# Patient Record
Sex: FEMALE | Race: WHITE | Employment: FULL TIME
[De-identification: names, ages, dates, MRNs, and addresses within clinical notes are randomized per-mention and may not be internally consistent; named-entity substitution may affect disease eponyms.]

---

## 2017-02-06 ENCOUNTER — OFFICE VISIT (OUTPATIENT)
Dept: FAMILY MEDICINE CLINIC | Facility: CLINIC | Age: 37
End: 2017-02-06

## 2017-02-06 VITALS
WEIGHT: 242.4 LBS | HEART RATE: 94 BPM | SYSTOLIC BLOOD PRESSURE: 124 MMHG | DIASTOLIC BLOOD PRESSURE: 89 MMHG | HEIGHT: 70 IN | BODY MASS INDEX: 34.7 KG/M2 | TEMPERATURE: 97.7 F

## 2017-02-06 DIAGNOSIS — R51.9 FREQUENT HEADACHES: Primary | ICD-10-CM

## 2017-02-06 DIAGNOSIS — E66.9 OBESITY (BMI 30-39.9): ICD-10-CM

## 2017-02-06 PROCEDURE — 99203 OFFICE O/P NEW LOW 30 MIN: CPT | Performed by: FAMILY MEDICINE

## 2017-02-06 RX ORDER — SUMATRIPTAN 50 MG/1
50 TABLET, FILM COATED ORAL
Qty: 9 TABLET | Refills: 3 | Status: SHIPPED | OUTPATIENT
Start: 2017-02-06 | End: 2017-03-23 | Stop reason: SDUPTHER

## 2017-02-06 ASSESSMENT — ENCOUNTER SYMPTOMS
RHINORRHEA: 0
WHEEZING: 0
COUGH: 0
EYE WATERING: 0
VOMITING: 0
SINUS PRESSURE: 0
PHOTOPHOBIA: 1
NAUSEA: 0
SHORTNESS OF BREATH: 0
EYE PAIN: 0

## 2017-02-06 ASSESSMENT — PATIENT HEALTH QUESTIONNAIRE - PHQ9
SUM OF ALL RESPONSES TO PHQ QUESTIONS 1-9: 1
2. FEELING DOWN, DEPRESSED OR HOPELESS: 1
1. LITTLE INTEREST OR PLEASURE IN DOING THINGS: 0
SUM OF ALL RESPONSES TO PHQ9 QUESTIONS 1 & 2: 1

## 2017-03-07 ENCOUNTER — OFFICE VISIT (OUTPATIENT)
Dept: NEUROLOGY | Facility: CLINIC | Age: 37
End: 2017-03-07

## 2017-03-07 VITALS
HEIGHT: 70 IN | DIASTOLIC BLOOD PRESSURE: 80 MMHG | SYSTOLIC BLOOD PRESSURE: 112 MMHG | BODY MASS INDEX: 34.76 KG/M2 | HEART RATE: 88 BPM | WEIGHT: 242.8 LBS

## 2017-03-07 DIAGNOSIS — G44.52 NEW PERSISTENT DAILY HEADACHE: Primary | ICD-10-CM

## 2017-03-07 DIAGNOSIS — R93.0 ABNORMAL CT SCAN, HEAD: ICD-10-CM

## 2017-03-07 DIAGNOSIS — R51.9 NEW ONSET HEADACHE: ICD-10-CM

## 2017-03-07 PROCEDURE — 99204 OFFICE O/P NEW MOD 45 MIN: CPT | Performed by: PSYCHIATRY & NEUROLOGY

## 2017-03-07 RX ORDER — BUPROPION HYDROCHLORIDE 150 MG/1
150 TABLET, EXTENDED RELEASE ORAL 2 TIMES DAILY
COMMUNITY
End: 2019-08-09 | Stop reason: ALTCHOICE

## 2017-03-07 RX ORDER — TIZANIDINE 2 MG/1
TABLET ORAL
Qty: 30 TABLET | Refills: 0 | Status: SHIPPED | OUTPATIENT
Start: 2017-03-07 | End: 2017-09-26 | Stop reason: SDUPTHER

## 2017-03-07 RX ORDER — TOPIRAMATE 25 MG/1
TABLET ORAL
Qty: 120 TABLET | Refills: 1 | Status: SHIPPED | OUTPATIENT
Start: 2017-03-07 | End: 2017-09-26 | Stop reason: SDUPTHER

## 2017-03-07 RX ORDER — QUETIAPINE FUMARATE 400 MG/1
100 TABLET, FILM COATED ORAL DAILY
COMMUNITY
End: 2019-08-09 | Stop reason: ALTCHOICE

## 2017-03-23 ENCOUNTER — OFFICE VISIT (OUTPATIENT)
Dept: FAMILY MEDICINE CLINIC | Age: 37
End: 2017-03-23
Payer: MEDICARE

## 2017-03-23 ENCOUNTER — HOSPITAL ENCOUNTER (OUTPATIENT)
Age: 37
Setting detail: SPECIMEN
Discharge: HOME | End: 2017-03-23

## 2017-03-23 VITALS
HEIGHT: 70 IN | BODY MASS INDEX: 34.01 KG/M2 | WEIGHT: 237.6 LBS | SYSTOLIC BLOOD PRESSURE: 131 MMHG | HEART RATE: 86 BPM | TEMPERATURE: 97.9 F | DIASTOLIC BLOOD PRESSURE: 87 MMHG

## 2017-03-23 DIAGNOSIS — G43.909 MIGRAINE WITHOUT STATUS MIGRAINOSUS, NOT INTRACTABLE, UNSPECIFIED MIGRAINE TYPE: Primary | ICD-10-CM

## 2017-03-23 DIAGNOSIS — R51.9 FREQUENT HEADACHES: ICD-10-CM

## 2017-03-23 DIAGNOSIS — Z00.00 HEALTH CARE MAINTENANCE: ICD-10-CM

## 2017-03-23 LAB
ALT SERPL-CCNC: 22 U/L (ref 5–33)
CREAT SERPL-MCNC: 0.97 MG/DL (ref 0.5–0.9)
GFR AFRICAN AMERICAN: >60 ML/MIN
GFR NON-AFRICAN AMERICAN: >60 ML/MIN
GFR SERPL CREATININE-BSD FRML MDRD: ABNORMAL ML/MIN/{1.73_M2}
GFR SERPL CREATININE-BSD FRML MDRD: ABNORMAL ML/MIN/{1.73_M2}
HCT VFR BLD CALC: 40.3 % (ref 36–46)
HEMOGLOBIN: 13.9 G/DL (ref 12–16)
HIV AG/AB: NONREACTIVE
MCH RBC QN AUTO: 31.4 PG (ref 26–34)
MCHC RBC AUTO-ENTMCNC: 34.6 G/DL (ref 31–37)
MCV RBC AUTO: 90.9 FL (ref 80–100)
PDW BLD-RTO: 14.8 % (ref 12.5–15.4)
PLATELET # BLD: 290 K/UL (ref 140–450)
PMV BLD AUTO: 9.7 FL (ref 6–12)
RBC # BLD: 4.44 M/UL (ref 4–5.2)
WBC # BLD: 6.1 K/UL (ref 3.5–11)

## 2017-03-23 PROCEDURE — 99213 OFFICE O/P EST LOW 20 MIN: CPT | Performed by: FAMILY MEDICINE

## 2017-03-23 PROCEDURE — 90471 IMMUNIZATION ADMIN: CPT | Performed by: FAMILY MEDICINE

## 2017-03-23 PROCEDURE — 90715 TDAP VACCINE 7 YRS/> IM: CPT | Performed by: FAMILY MEDICINE

## 2017-03-23 RX ORDER — SUMATRIPTAN 50 MG/1
50 TABLET, FILM COATED ORAL
Qty: 9 TABLET | Refills: 5 | Status: SHIPPED | OUTPATIENT
Start: 2017-03-23 | End: 2017-09-26 | Stop reason: DRUGHIGH

## 2017-03-23 ASSESSMENT — ENCOUNTER SYMPTOMS
COUGH: 0
CONSTIPATION: 0
SHORTNESS OF BREATH: 0

## 2017-03-27 ENCOUNTER — OFFICE VISIT (OUTPATIENT)
Dept: FAMILY MEDICINE CLINIC | Age: 37
End: 2017-03-27
Payer: MEDICARE

## 2017-03-27 VITALS
HEIGHT: 70 IN | BODY MASS INDEX: 34.1 KG/M2 | DIASTOLIC BLOOD PRESSURE: 94 MMHG | WEIGHT: 238.2 LBS | SYSTOLIC BLOOD PRESSURE: 127 MMHG | HEART RATE: 101 BPM | TEMPERATURE: 97.9 F

## 2017-03-27 DIAGNOSIS — L03.114 CELLULITIS OF LEFT UPPER EXTREMITY: Primary | ICD-10-CM

## 2017-03-27 PROCEDURE — 99213 OFFICE O/P EST LOW 20 MIN: CPT | Performed by: STUDENT IN AN ORGANIZED HEALTH CARE EDUCATION/TRAINING PROGRAM

## 2017-03-27 RX ORDER — AMOXICILLIN AND CLAVULANATE POTASSIUM 875; 125 MG/1; MG/1
1 TABLET, FILM COATED ORAL 2 TIMES DAILY
Qty: 20 TABLET | Refills: 0 | Status: SHIPPED | OUTPATIENT
Start: 2017-03-27 | End: 2017-04-06

## 2017-03-27 ASSESSMENT — ENCOUNTER SYMPTOMS
EYE PAIN: 0
BACK PAIN: 0
NAUSEA: 1
COUGH: 0
VOMITING: 0
SINUS PRESSURE: 0
COLOR CHANGE: 1
EYE REDNESS: 0
RHINORRHEA: 0
SHORTNESS OF BREATH: 0

## 2017-05-30 ENCOUNTER — HOSPITAL ENCOUNTER (OUTPATIENT)
Age: 37
Setting detail: SPECIMEN
Discharge: HOME | End: 2017-05-30
Payer: MEDICARE

## 2017-05-31 LAB
C TRACH DNA GENITAL QL NAA+PROBE: NEGATIVE
DIRECT EXAM: ABNORMAL
Lab: ABNORMAL
N. GONORRHOEAE DNA: NEGATIVE
SPECIMEN DESCRIPTION: ABNORMAL
STATUS: ABNORMAL

## 2017-08-29 ENCOUNTER — HOSPITAL ENCOUNTER (OUTPATIENT)
Age: 37
Setting detail: SPECIMEN
Discharge: HOME | End: 2017-08-29
Payer: MEDICARE

## 2017-08-29 LAB
ABSOLUTE EOS #: 0.1 K/UL (ref 0–0.4)
ABSOLUTE LYMPH #: 2 K/UL (ref 1–4.8)
ABSOLUTE MONO #: 0.4 K/UL (ref 0.1–1.2)
ALT SERPL-CCNC: 15 U/L (ref 5–33)
BASOPHILS # BLD: 0 %
BASOPHILS ABSOLUTE: 0 K/UL (ref 0–0.2)
CREAT SERPL-MCNC: 0.98 MG/DL (ref 0.5–0.9)
DIFFERENTIAL TYPE: ABNORMAL
EOSINOPHILS RELATIVE PERCENT: 1 %
GFR AFRICAN AMERICAN: >60 ML/MIN
GFR NON-AFRICAN AMERICAN: >60 ML/MIN
GFR SERPL CREATININE-BSD FRML MDRD: ABNORMAL ML/MIN/{1.73_M2}
GFR SERPL CREATININE-BSD FRML MDRD: ABNORMAL ML/MIN/{1.73_M2}
HCT VFR BLD CALC: 33.5 % (ref 36–46)
HEMOGLOBIN: 11.2 G/DL (ref 12–16)
LYMPHOCYTES # BLD: 27 %
MCH RBC QN AUTO: 32.1 PG (ref 26–34)
MCHC RBC AUTO-ENTMCNC: 33.5 G/DL (ref 31–37)
MCV RBC AUTO: 95.7 FL (ref 80–100)
MONOCYTES # BLD: 5 %
PDW BLD-RTO: 16.2 % (ref 12.5–15.4)
PLATELET # BLD: 362 K/UL (ref 140–450)
PLATELET ESTIMATE: ABNORMAL
PMV BLD AUTO: 8 FL (ref 6–12)
RBC # BLD: 3.51 M/UL (ref 4–5.2)
RBC # BLD: ABNORMAL 10*6/UL
SEG NEUTROPHILS: 67 %
SEGMENTED NEUTROPHILS ABSOLUTE COUNT: 5 K/UL (ref 1.8–7.7)
WBC # BLD: 7.6 K/UL (ref 3.5–11)
WBC # BLD: ABNORMAL 10*3/UL

## 2017-09-15 ENCOUNTER — HOSPITAL ENCOUNTER (OUTPATIENT)
Age: 37
Setting detail: SPECIMEN
Discharge: HOME | End: 2017-09-15
Payer: MEDICARE

## 2017-09-15 LAB
ALBUMIN SERPL-MCNC: 4.2 G/DL (ref 3.5–5.2)
ALBUMIN/GLOBULIN RATIO: 1.7 (ref 1–2.5)
ALP BLD-CCNC: 58 U/L (ref 35–104)
ALT SERPL-CCNC: 19 U/L (ref 5–33)
ANION GAP SERPL CALCULATED.3IONS-SCNC: 13 MMOL/L (ref 9–17)
AST SERPL-CCNC: 19 U/L
BILIRUB SERPL-MCNC: 0.43 MG/DL (ref 0.3–1.2)
BUN BLDV-MCNC: 13 MG/DL (ref 6–20)
BUN/CREAT BLD: ABNORMAL (ref 9–20)
CALCIUM SERPL-MCNC: 9.5 MG/DL (ref 8.6–10.4)
CHLORIDE BLD-SCNC: 105 MMOL/L (ref 98–107)
CHOLESTEROL/HDL RATIO: 2.3
CHOLESTEROL: 157 MG/DL
CO2: 22 MMOL/L (ref 20–31)
CREAT SERPL-MCNC: 1 MG/DL (ref 0.5–0.9)
GFR AFRICAN AMERICAN: >60 ML/MIN
GFR NON-AFRICAN AMERICAN: >60 ML/MIN
GFR SERPL CREATININE-BSD FRML MDRD: ABNORMAL ML/MIN/{1.73_M2}
GFR SERPL CREATININE-BSD FRML MDRD: ABNORMAL ML/MIN/{1.73_M2}
GLUCOSE BLD-MCNC: 95 MG/DL (ref 70–99)
HDLC SERPL-MCNC: 69 MG/DL
LDL CHOLESTEROL: 35 MG/DL (ref 0–130)
POTASSIUM SERPL-SCNC: 3.9 MMOL/L (ref 3.7–5.3)
SODIUM BLD-SCNC: 140 MMOL/L (ref 135–144)
T4 TOTAL: 7.5 UG/DL (ref 4.5–12)
TOTAL PROTEIN: 6.7 G/DL (ref 6.4–8.3)
TRIGL SERPL-MCNC: 266 MG/DL
TSH SERPL DL<=0.05 MIU/L-ACNC: 4.02 MIU/L (ref 0.3–5)
VLDLC SERPL CALC-MCNC: ABNORMAL MG/DL (ref 1–30)

## 2017-09-21 ENCOUNTER — HOSPITAL ENCOUNTER (OUTPATIENT)
Age: 37
Setting detail: SPECIMEN
Discharge: HOME | End: 2017-09-21
Payer: MEDICARE

## 2017-09-22 LAB
DIRECT EXAM: ABNORMAL
Lab: ABNORMAL
SPECIMEN DESCRIPTION: ABNORMAL
STATUS: ABNORMAL

## 2017-09-26 ENCOUNTER — OFFICE VISIT (OUTPATIENT)
Dept: NEUROLOGY | Age: 37
End: 2017-09-26
Payer: MEDICARE

## 2017-09-26 VITALS
WEIGHT: 225.8 LBS | HEIGHT: 70 IN | DIASTOLIC BLOOD PRESSURE: 96 MMHG | HEART RATE: 78 BPM | BODY MASS INDEX: 32.33 KG/M2 | SYSTOLIC BLOOD PRESSURE: 141 MMHG

## 2017-09-26 DIAGNOSIS — G44.52 NEW PERSISTENT DAILY HEADACHE: ICD-10-CM

## 2017-09-26 DIAGNOSIS — G43.009 MIGRAINE WITHOUT AURA AND WITHOUT STATUS MIGRAINOSUS, NOT INTRACTABLE: Primary | ICD-10-CM

## 2017-09-26 DIAGNOSIS — R51.9 FREQUENT HEADACHES: ICD-10-CM

## 2017-09-26 DIAGNOSIS — R93.0 ABNORMAL CT SCAN, HEAD: ICD-10-CM

## 2017-09-26 PROCEDURE — 99214 OFFICE O/P EST MOD 30 MIN: CPT | Performed by: NURSE PRACTITIONER

## 2017-09-26 RX ORDER — SUMATRIPTAN 100 MG/1
100 TABLET, FILM COATED ORAL
Qty: 9 TABLET | Refills: 1 | Status: SHIPPED | OUTPATIENT
Start: 2017-09-26 | End: 2018-05-01 | Stop reason: SDUPTHER

## 2017-09-26 RX ORDER — TOPIRAMATE 25 MG/1
TABLET ORAL
Qty: 120 TABLET | Refills: 1 | Status: SHIPPED | OUTPATIENT
Start: 2017-09-26 | End: 2019-03-28 | Stop reason: ALTCHOICE

## 2017-09-26 RX ORDER — TIZANIDINE 2 MG/1
2 TABLET ORAL EVERY 8 HOURS PRN
Qty: 30 TABLET | Refills: 2 | Status: SHIPPED | OUTPATIENT
Start: 2017-09-26 | End: 2018-05-01 | Stop reason: SDUPTHER

## 2017-10-05 ENCOUNTER — HOSPITAL ENCOUNTER (OUTPATIENT)
Dept: ULTRASOUND IMAGING | Age: 37
End: 2017-10-05
Payer: MEDICARE

## 2017-10-05 ENCOUNTER — HOSPITAL ENCOUNTER (OUTPATIENT)
Dept: MAMMOGRAPHY | Age: 37
Discharge: HOME | End: 2017-10-05
Payer: MEDICARE

## 2017-10-05 DIAGNOSIS — R92.8 ABNORMAL MAMMOGRAM: ICD-10-CM

## 2017-10-05 PROCEDURE — G0204 DX MAMMO INCL CAD BI: HCPCS

## 2017-10-12 ENCOUNTER — HOSPITAL ENCOUNTER (OUTPATIENT)
Dept: MRI IMAGING | Age: 37
Discharge: HOME | End: 2017-10-12
Payer: MEDICARE

## 2017-10-12 DIAGNOSIS — G43.009 MIGRAINE WITHOUT AURA AND WITHOUT STATUS MIGRAINOSUS, NOT INTRACTABLE: ICD-10-CM

## 2017-10-12 DIAGNOSIS — R93.0 ABNORMAL CT SCAN, HEAD: ICD-10-CM

## 2017-10-12 PROCEDURE — 6360000004 HC RX CONTRAST MEDICATION: Performed by: PSYCHIATRY & NEUROLOGY

## 2017-10-12 PROCEDURE — 70553 MRI BRAIN STEM W/O & W/DYE: CPT

## 2017-10-12 PROCEDURE — A9579 GAD-BASE MR CONTRAST NOS,1ML: HCPCS | Performed by: PSYCHIATRY & NEUROLOGY

## 2017-10-12 RX ADMIN — GADOPENTETATE DIMEGLUMINE 20 ML: 469.01 INJECTION INTRAVENOUS at 11:43

## 2017-11-20 ENCOUNTER — TELEPHONE (OUTPATIENT)
Dept: NEUROLOGY | Age: 37
End: 2017-11-20

## 2017-12-06 ENCOUNTER — HOSPITAL ENCOUNTER (OUTPATIENT)
Age: 37
Setting detail: SPECIMEN
Discharge: HOME | End: 2017-12-06
Payer: MEDICARE

## 2017-12-06 LAB
ABSOLUTE EOS #: 0.06 K/UL (ref 0–0.44)
ABSOLUTE IMMATURE GRANULOCYTE: <0.03 K/UL (ref 0–0.3)
ABSOLUTE LYMPH #: 2.06 K/UL (ref 1.1–3.7)
ABSOLUTE MONO #: 0.37 K/UL (ref 0.1–1.2)
ALT SERPL-CCNC: 21 U/L (ref 5–33)
BASOPHILS # BLD: 1 % (ref 0–2)
BASOPHILS ABSOLUTE: 0.04 K/UL (ref 0–0.2)
CREAT SERPL-MCNC: 0.73 MG/DL (ref 0.5–0.9)
DIFFERENTIAL TYPE: NORMAL
EOSINOPHILS RELATIVE PERCENT: 1 % (ref 1–4)
GFR AFRICAN AMERICAN: >60 ML/MIN
GFR NON-AFRICAN AMERICAN: >60 ML/MIN
GFR SERPL CREATININE-BSD FRML MDRD: NORMAL ML/MIN/{1.73_M2}
GFR SERPL CREATININE-BSD FRML MDRD: NORMAL ML/MIN/{1.73_M2}
HCT VFR BLD CALC: 42.2 % (ref 36.3–47.1)
HEMOGLOBIN: 13.9 G/DL (ref 11.9–15.1)
IMMATURE GRANULOCYTES: 0 %
LYMPHOCYTES # BLD: 28 % (ref 24–43)
MCH RBC QN AUTO: 32.4 PG (ref 25.2–33.5)
MCHC RBC AUTO-ENTMCNC: 32.9 G/DL (ref 28.4–34.8)
MCV RBC AUTO: 98.4 FL (ref 82.6–102.9)
MONOCYTES # BLD: 5 % (ref 3–12)
PDW BLD-RTO: 13.1 % (ref 11.8–14.4)
PLATELET # BLD: 305 K/UL (ref 138–453)
PLATELET ESTIMATE: NORMAL
PMV BLD AUTO: 10 FL (ref 8.1–13.5)
RBC # BLD: 4.29 M/UL (ref 3.95–5.11)
RBC # BLD: NORMAL 10*6/UL
SEG NEUTROPHILS: 65 % (ref 36–65)
SEGMENTED NEUTROPHILS ABSOLUTE COUNT: 4.85 K/UL (ref 1.5–8.1)
WBC # BLD: 7.4 K/UL (ref 3.5–11.3)
WBC # BLD: NORMAL 10*3/UL

## 2017-12-26 ENCOUNTER — APPOINTMENT (OUTPATIENT)
Dept: GENERAL RADIOLOGY | Age: 37
End: 2017-12-26
Payer: MEDICARE

## 2017-12-26 ENCOUNTER — HOSPITAL ENCOUNTER (EMERGENCY)
Age: 37
Discharge: HOME | End: 2017-12-26
Attending: EMERGENCY MEDICINE
Payer: MEDICARE

## 2017-12-26 VITALS
BODY MASS INDEX: 30.85 KG/M2 | HEART RATE: 105 BPM | TEMPERATURE: 97.5 F | SYSTOLIC BLOOD PRESSURE: 180 MMHG | RESPIRATION RATE: 17 BRPM | OXYGEN SATURATION: 98 % | WEIGHT: 215 LBS | DIASTOLIC BLOOD PRESSURE: 116 MMHG

## 2017-12-26 DIAGNOSIS — S61.411A LACERATION OF RIGHT HAND WITHOUT FOREIGN BODY, INITIAL ENCOUNTER: Primary | ICD-10-CM

## 2017-12-26 PROCEDURE — 99283 EMERGENCY DEPT VISIT LOW MDM: CPT

## 2017-12-26 PROCEDURE — 12001 RPR S/N/AX/GEN/TRNK 2.5CM/<: CPT

## 2017-12-26 PROCEDURE — 73140 X-RAY EXAM OF FINGER(S): CPT

## 2017-12-26 RX ORDER — MELOXICAM 7.5 MG/1
7.5 TABLET ORAL DAILY
COMMUNITY
End: 2019-03-28 | Stop reason: ALTCHOICE

## 2017-12-26 ASSESSMENT — PAIN DESCRIPTION - LOCATION: LOCATION: FINGER (COMMENT WHICH ONE)

## 2017-12-26 ASSESSMENT — PAIN SCALES - GENERAL: PAINLEVEL_OUTOF10: 8

## 2017-12-26 ASSESSMENT — PAIN DESCRIPTION - ORIENTATION: ORIENTATION: RIGHT

## 2017-12-26 NOTE — ED PROVIDER NOTES
[facility] ED     Emergency Department     Faculty Attestation        I performed a history and physical examination of the patient and discussed management with the resident. I reviewed the residents note and agree with the documented findings and plan of care. Any areas of disagreement are noted on the chart. I was personally present for the key portions of any procedures. I have documented in the chart those procedures where I was not present during the key portions. I have reviewed the emergency nurses triage note. I agree with the chief complaint, past medical history, past surgical history, allergies, medications, social and family history as documented unless otherwise noted below. For Physician Assistant/ Nurse Practitioner cases/documentation I have personally evaluated this patient and have completed at least one if not all key elements of the E/M (history, physical exam, and MDM). Additional findings are as noted. PCP:  José Duncan MD    Pertinent Comments:     Patient is a 45-year-old female who had a laceration to the fifth digit MCP area from a glass while doing dishes and is neurovascular intact however was cutting less and therefore we'll obtain a screening x-ray and assure tetanus up-to-date and suture after. Critical Care  None    Note, if the patient's blood pressure was elevated, and they have no history of hypertension, they were informed of the following: The patient may have Pre-hypertension/Hypertension: The patient has been informed that they may have pre-hypertension or Hypertension based on a blood pressure reading in the emergency department. I recommend that the patient call the primary care provider listed on their discharge instructions or a physician of their choice this week to arrange follow up for further evaluation of possible pre-hypertension or Hypertension.     (Please note that portions of this note were completed with a voice recognition program. Efforts were made to edit the dictations but occasionally words are mis-transcribed.  Whenever words are used in this note in any gender, they shall be construed as though they were used in the gender appropriate to the circumstances; and whenever words are used in this note in the singular or plural form, they shall be construed as though they were used in the form appropriate to the circumstances.)    MD Ab Kelley [place]  Attending Emergency Medicine Physician              Pan Roa MD  12/26/17 [id number]

## 2018-04-11 ENCOUNTER — HOSPITAL ENCOUNTER (OUTPATIENT)
Age: 38
Setting detail: SPECIMEN
Discharge: HOME | End: 2018-04-11
Payer: MEDICARE

## 2018-04-11 LAB
ABSOLUTE EOS #: 0.09 K/UL (ref 0–0.44)
ABSOLUTE IMMATURE GRANULOCYTE: <0.03 K/UL (ref 0–0.3)
ABSOLUTE LYMPH #: 2.32 K/UL (ref 1.1–3.7)
ABSOLUTE MONO #: 0.33 K/UL (ref 0.1–1.2)
ALT SERPL-CCNC: 18 U/L (ref 5–33)
BASOPHILS # BLD: 1 % (ref 0–2)
BASOPHILS ABSOLUTE: 0.04 K/UL (ref 0–0.2)
CREAT SERPL-MCNC: 0.65 MG/DL (ref 0.5–0.9)
DIFFERENTIAL TYPE: ABNORMAL
EOSINOPHILS RELATIVE PERCENT: 2 % (ref 1–4)
GFR AFRICAN AMERICAN: >60 ML/MIN
GFR NON-AFRICAN AMERICAN: >60 ML/MIN
GFR SERPL CREATININE-BSD FRML MDRD: NORMAL ML/MIN/{1.73_M2}
GFR SERPL CREATININE-BSD FRML MDRD: NORMAL ML/MIN/{1.73_M2}
HCT VFR BLD CALC: 40.1 % (ref 36.3–47.1)
HEMOGLOBIN: 13.2 G/DL (ref 11.9–15.1)
IMMATURE GRANULOCYTES: 0 %
LYMPHOCYTES # BLD: 47 % (ref 24–43)
MCH RBC QN AUTO: 32.4 PG (ref 25.2–33.5)
MCHC RBC AUTO-ENTMCNC: 32.9 G/DL (ref 28.4–34.8)
MCV RBC AUTO: 98.5 FL (ref 82.6–102.9)
MONOCYTES # BLD: 7 % (ref 3–12)
NRBC AUTOMATED: 0 PER 100 WBC
PDW BLD-RTO: 13.2 % (ref 11.8–14.4)
PLATELET # BLD: 326 K/UL (ref 138–453)
PLATELET ESTIMATE: ABNORMAL
PMV BLD AUTO: 10.2 FL (ref 8.1–13.5)
RBC # BLD: 4.07 M/UL (ref 3.95–5.11)
RBC # BLD: ABNORMAL 10*6/UL
SEG NEUTROPHILS: 43 % (ref 36–65)
SEGMENTED NEUTROPHILS ABSOLUTE COUNT: 2.15 K/UL (ref 1.5–8.1)
WBC # BLD: 5 K/UL (ref 3.5–11.3)
WBC # BLD: ABNORMAL 10*3/UL

## 2018-05-01 ENCOUNTER — TELEPHONE (OUTPATIENT)
Dept: ADMINISTRATIVE | Age: 38
End: 2018-05-01

## 2018-05-01 ENCOUNTER — TELEPHONE (OUTPATIENT)
Dept: FAMILY MEDICINE CLINIC | Age: 38
End: 2018-05-01

## 2018-05-01 ENCOUNTER — OFFICE VISIT (OUTPATIENT)
Dept: FAMILY MEDICINE CLINIC | Age: 38
End: 2018-05-01
Payer: MEDICARE

## 2018-05-01 VITALS
DIASTOLIC BLOOD PRESSURE: 107 MMHG | SYSTOLIC BLOOD PRESSURE: 154 MMHG | HEIGHT: 70 IN | WEIGHT: 236 LBS | TEMPERATURE: 97 F | HEART RATE: 104 BPM | BODY MASS INDEX: 33.79 KG/M2

## 2018-05-01 DIAGNOSIS — G43.809 OTHER MIGRAINE WITHOUT STATUS MIGRAINOSUS, NOT INTRACTABLE: ICD-10-CM

## 2018-05-01 DIAGNOSIS — Z76.0 MEDICATION REFILL: ICD-10-CM

## 2018-05-01 DIAGNOSIS — M62.838 MUSCLE SPASM: ICD-10-CM

## 2018-05-01 DIAGNOSIS — I10 ESSENTIAL HYPERTENSION: Primary | ICD-10-CM

## 2018-05-01 DIAGNOSIS — G43.709 CHRONIC MIGRAINE WITHOUT AURA WITHOUT STATUS MIGRAINOSUS, NOT INTRACTABLE: Primary | ICD-10-CM

## 2018-05-01 PROCEDURE — G8417 CALC BMI ABV UP PARAM F/U: HCPCS | Performed by: FAMILY MEDICINE

## 2018-05-01 PROCEDURE — 99213 OFFICE O/P EST LOW 20 MIN: CPT | Performed by: FAMILY MEDICINE

## 2018-05-01 PROCEDURE — 99213 OFFICE O/P EST LOW 20 MIN: CPT

## 2018-05-01 PROCEDURE — 1036F TOBACCO NON-USER: CPT | Performed by: FAMILY MEDICINE

## 2018-05-01 PROCEDURE — G8427 DOCREV CUR MEDS BY ELIG CLIN: HCPCS | Performed by: FAMILY MEDICINE

## 2018-05-01 RX ORDER — TIZANIDINE 2 MG/1
4 TABLET ORAL EVERY 8 HOURS PRN
Qty: 30 TABLET | Refills: 2 | Status: SHIPPED | OUTPATIENT
Start: 2018-05-01 | End: 2018-09-06 | Stop reason: ALTCHOICE

## 2018-05-01 RX ORDER — OMEPRAZOLE 20 MG/1
CAPSULE, DELAYED RELEASE ORAL
Refills: 0 | COMMUNITY
Start: 2018-04-05 | End: 2019-06-14 | Stop reason: ALTCHOICE

## 2018-05-01 RX ORDER — CELECOXIB 200 MG/1
CAPSULE ORAL
Refills: 0 | COMMUNITY
Start: 2018-04-05 | End: 2018-05-01 | Stop reason: ALTCHOICE

## 2018-05-01 RX ORDER — SUMATRIPTAN 100 MG/1
100 TABLET, FILM COATED ORAL
Qty: 9 TABLET | Refills: 3 | Status: SHIPPED | OUTPATIENT
Start: 2018-05-01 | End: 2018-05-02 | Stop reason: SDUPTHER

## 2018-05-01 RX ORDER — HYDROCHLOROTHIAZIDE 25 MG/1
25 TABLET ORAL DAILY
Qty: 30 TABLET | Refills: 3 | Status: SHIPPED | OUTPATIENT
Start: 2018-05-01 | End: 2018-08-05 | Stop reason: SDUPTHER

## 2018-05-01 ASSESSMENT — PATIENT HEALTH QUESTIONNAIRE - PHQ9
SUM OF ALL RESPONSES TO PHQ9 QUESTIONS 1 & 2: 5
3. TROUBLE FALLING OR STAYING ASLEEP: 3
5. POOR APPETITE OR OVEREATING: 0
4. FEELING TIRED OR HAVING LITTLE ENERGY: 3
9. THOUGHTS THAT YOU WOULD BE BETTER OFF DEAD, OR OF HURTING YOURSELF: 0
8. MOVING OR SPEAKING SO SLOWLY THAT OTHER PEOPLE COULD HAVE NOTICED. OR THE OPPOSITE, BEING SO FIGETY OR RESTLESS THAT YOU HAVE BEEN MOVING AROUND A LOT MORE THAN USUAL: 3
10. IF YOU CHECKED OFF ANY PROBLEMS, HOW DIFFICULT HAVE THESE PROBLEMS MADE IT FOR YOU TO DO YOUR WORK, TAKE CARE OF THINGS AT HOME, OR GET ALONG WITH OTHER PEOPLE: 3
6. FEELING BAD ABOUT YOURSELF - OR THAT YOU ARE A FAILURE OR HAVE LET YOURSELF OR YOUR FAMILY DOWN: 1
1. LITTLE INTEREST OR PLEASURE IN DOING THINGS: 3
SUM OF ALL RESPONSES TO PHQ QUESTIONS 1-9: 18
2. FEELING DOWN, DEPRESSED OR HOPELESS: 2
7. TROUBLE CONCENTRATING ON THINGS, SUCH AS READING THE NEWSPAPER OR WATCHING TELEVISION: 3

## 2018-05-01 ASSESSMENT — ENCOUNTER SYMPTOMS
SHORTNESS OF BREATH: 0
BACK PAIN: 1
COUGH: 0
WHEEZING: 0

## 2018-05-02 ENCOUNTER — TELEPHONE (OUTPATIENT)
Dept: PEDIATRICS CLINIC | Age: 38
End: 2018-05-02

## 2018-05-02 ENCOUNTER — TELEPHONE (OUTPATIENT)
Dept: ADMINISTRATIVE | Age: 38
End: 2018-05-02

## 2018-05-02 DIAGNOSIS — G43.709 CHRONIC MIGRAINE WITHOUT AURA WITHOUT STATUS MIGRAINOSUS, NOT INTRACTABLE: ICD-10-CM

## 2018-05-02 RX ORDER — SUMATRIPTAN 100 MG/1
100 TABLET, FILM COATED ORAL
Qty: 9 TABLET | Refills: 3 | Status: SHIPPED | OUTPATIENT
Start: 2018-05-02 | End: 2018-09-06 | Stop reason: SDUPTHER

## 2018-05-02 RX ORDER — SUMATRIPTAN 100 MG/1
100 TABLET, FILM COATED ORAL
Qty: 9 TABLET | Refills: 3 | Status: CANCELLED | OUTPATIENT
Start: 2018-05-02 | End: 2018-05-02

## 2018-06-08 ENCOUNTER — OFFICE VISIT (OUTPATIENT)
Dept: FAMILY MEDICINE CLINIC | Age: 38
End: 2018-06-08
Payer: MEDICARE

## 2018-06-08 VITALS
WEIGHT: 236.4 LBS | HEART RATE: 108 BPM | TEMPERATURE: 97.5 F | BODY MASS INDEX: 33.84 KG/M2 | SYSTOLIC BLOOD PRESSURE: 130 MMHG | DIASTOLIC BLOOD PRESSURE: 90 MMHG | HEIGHT: 70 IN

## 2018-06-08 DIAGNOSIS — I10 ESSENTIAL HYPERTENSION: Primary | ICD-10-CM

## 2018-06-08 PROCEDURE — 1036F TOBACCO NON-USER: CPT | Performed by: FAMILY MEDICINE

## 2018-06-08 PROCEDURE — G8427 DOCREV CUR MEDS BY ELIG CLIN: HCPCS | Performed by: FAMILY MEDICINE

## 2018-06-08 PROCEDURE — 99213 OFFICE O/P EST LOW 20 MIN: CPT | Performed by: FAMILY MEDICINE

## 2018-06-08 PROCEDURE — G8417 CALC BMI ABV UP PARAM F/U: HCPCS | Performed by: FAMILY MEDICINE

## 2018-06-08 ASSESSMENT — ENCOUNTER SYMPTOMS
SHORTNESS OF BREATH: 0
BLURRED VISION: 0

## 2018-08-05 DIAGNOSIS — I10 ESSENTIAL HYPERTENSION: ICD-10-CM

## 2018-08-06 NOTE — TELEPHONE ENCOUNTER
Next Visit Date:  Future Appointments  Date Time Provider Mallory Henriquezi   9/6/2018 9:15 AM Joseph Can MD 7 [facility] Maintenance   Topic Date Due    Flu vaccine (1) 09/01/2018    Potassium monitoring  09/15/2018    Creatinine monitoring  04/11/2019    Cervical cancer screen  04/26/2021    DTaP/Tdap/Td vaccine (2 - Td) 03/23/2027    HIV screen  Completed       Hemoglobin A1C (%)   Date Value   07/13/2016 5.4             ( goal A1C is < 7)   No results found for: LABMICR  LDL Cholesterol (mg/dL)   Date Value   09/15/2017 35   07/13/2016 79       (goal LDL is <100)   AST (U/L)   Date Value   09/15/2017 19     ALT (U/L)   Date Value   04/11/2018 18     BUN (mg/dL)   Date Value   09/15/2017 13     BP Readings from Last 3 Encounters:   06/08/18 (!) 130/90   05/01/18 (!) 154/107   12/26/17 (!) 180/116          (goal 120/80)    All Future Testing planned in [facility]              Patient Active Problem List:     Annual physical exam     New persistent daily headache     Abnormal CT scan, head     Frequent headaches     Health care maintenance     Migraine without status migrainosus, not intractable       Please address the medication refill and close the encounter. If I can be of assistance, please route to the applicable pool. Thank you.

## 2018-08-07 RX ORDER — HYDROCHLOROTHIAZIDE 25 MG/1
TABLET ORAL
Qty: 30 TABLET | Refills: 3 | Status: SHIPPED | OUTPATIENT
Start: 2018-08-07 | End: 2018-09-06 | Stop reason: SDUPTHER

## 2018-08-08 ENCOUNTER — HOSPITAL ENCOUNTER (OUTPATIENT)
Age: 38
Setting detail: SPECIMEN
Discharge: HOME | End: 2018-08-08
Payer: MEDICARE

## 2018-08-08 LAB
ABSOLUTE EOS #: 0.06 K/UL (ref 0–0.44)
ABSOLUTE IMMATURE GRANULOCYTE: 0.05 K/UL (ref 0–0.3)
ABSOLUTE LYMPH #: 2.5 K/UL (ref 1.1–3.7)
ABSOLUTE MONO #: 0.27 K/UL (ref 0.1–1.2)
ALT SERPL-CCNC: 20 U/L (ref 5–33)
BASOPHILS # BLD: 1 % (ref 0–2)
BASOPHILS ABSOLUTE: 0.03 K/UL (ref 0–0.2)
CREAT SERPL-MCNC: 0.58 MG/DL (ref 0.5–0.9)
DIFFERENTIAL TYPE: ABNORMAL
EOSINOPHILS RELATIVE PERCENT: 1 % (ref 1–4)
GFR AFRICAN AMERICAN: >60 ML/MIN
GFR NON-AFRICAN AMERICAN: >60 ML/MIN
GFR SERPL CREATININE-BSD FRML MDRD: NORMAL ML/MIN/{1.73_M2}
GFR SERPL CREATININE-BSD FRML MDRD: NORMAL ML/MIN/{1.73_M2}
HCT VFR BLD CALC: 38.5 % (ref 36.3–47.1)
HEMOGLOBIN: 13 G/DL (ref 11.9–15.1)
IMMATURE GRANULOCYTES: 1 %
LYMPHOCYTES # BLD: 49 % (ref 24–43)
MCH RBC QN AUTO: 34.5 PG (ref 25.2–33.5)
MCHC RBC AUTO-ENTMCNC: 33.8 G/DL (ref 28.4–34.8)
MCV RBC AUTO: 102.1 FL (ref 82.6–102.9)
MONOCYTES # BLD: 5 % (ref 3–12)
NRBC AUTOMATED: 0.4 PER 100 WBC
PDW BLD-RTO: 13.9 % (ref 11.8–14.4)
PLATELET # BLD: 302 K/UL (ref 138–453)
PLATELET ESTIMATE: ABNORMAL
PMV BLD AUTO: 10.5 FL (ref 8.1–13.5)
RBC # BLD: 3.77 M/UL (ref 3.95–5.11)
RBC # BLD: ABNORMAL 10*6/UL
SEG NEUTROPHILS: 43 % (ref 36–65)
SEGMENTED NEUTROPHILS ABSOLUTE COUNT: 2.23 K/UL (ref 1.5–8.1)
WBC # BLD: 5.1 K/UL (ref 3.5–11.3)
WBC # BLD: ABNORMAL 10*3/UL

## 2018-09-06 ENCOUNTER — OFFICE VISIT (OUTPATIENT)
Dept: FAMILY MEDICINE CLINIC | Age: 38
End: 2018-09-06
Payer: MEDICARE

## 2018-09-06 ENCOUNTER — TELEPHONE (OUTPATIENT)
Dept: ADMINISTRATIVE | Age: 38
End: 2018-09-06

## 2018-09-06 VITALS
HEIGHT: 70 IN | HEART RATE: 91 BPM | WEIGHT: 242.4 LBS | DIASTOLIC BLOOD PRESSURE: 80 MMHG | TEMPERATURE: 97.5 F | BODY MASS INDEX: 34.7 KG/M2 | SYSTOLIC BLOOD PRESSURE: 130 MMHG

## 2018-09-06 DIAGNOSIS — M62.838 NECK MUSCLE SPASM: ICD-10-CM

## 2018-09-06 DIAGNOSIS — I10 ESSENTIAL HYPERTENSION: Primary | ICD-10-CM

## 2018-09-06 DIAGNOSIS — Z71.6 ENCOUNTER FOR SMOKING CESSATION COUNSELING: ICD-10-CM

## 2018-09-06 DIAGNOSIS — G43.709 CHRONIC MIGRAINE WITHOUT AURA WITHOUT STATUS MIGRAINOSUS, NOT INTRACTABLE: ICD-10-CM

## 2018-09-06 PROCEDURE — G8417 CALC BMI ABV UP PARAM F/U: HCPCS | Performed by: STUDENT IN AN ORGANIZED HEALTH CARE EDUCATION/TRAINING PROGRAM

## 2018-09-06 PROCEDURE — G8427 DOCREV CUR MEDS BY ELIG CLIN: HCPCS | Performed by: STUDENT IN AN ORGANIZED HEALTH CARE EDUCATION/TRAINING PROGRAM

## 2018-09-06 PROCEDURE — 99213 OFFICE O/P EST LOW 20 MIN: CPT | Performed by: STUDENT IN AN ORGANIZED HEALTH CARE EDUCATION/TRAINING PROGRAM

## 2018-09-06 PROCEDURE — 1036F TOBACCO NON-USER: CPT | Performed by: STUDENT IN AN ORGANIZED HEALTH CARE EDUCATION/TRAINING PROGRAM

## 2018-09-06 RX ORDER — NICOTINE 21 MG/24HR
1 PATCH, TRANSDERMAL 24 HOURS TRANSDERMAL EVERY 24 HOURS
Qty: 30 PATCH | Refills: 0 | Status: SHIPPED | OUTPATIENT
Start: 2018-09-06 | End: 2019-03-28 | Stop reason: ALTCHOICE

## 2018-09-06 RX ORDER — SUMATRIPTAN 100 MG/1
100 TABLET, FILM COATED ORAL
Qty: 9 TABLET | Refills: 3 | Status: SHIPPED | OUTPATIENT
Start: 2018-09-06 | End: 2019-03-28 | Stop reason: ALTCHOICE

## 2018-09-06 RX ORDER — CYCLOBENZAPRINE HCL 5 MG
5 TABLET ORAL 3 TIMES DAILY PRN
Qty: 90 TABLET | Refills: 0 | Status: SHIPPED | OUTPATIENT
Start: 2018-09-06 | End: 2018-10-04 | Stop reason: SDUPTHER

## 2018-09-06 RX ORDER — HYDROCHLOROTHIAZIDE 25 MG/1
TABLET ORAL
Qty: 30 TABLET | Refills: 3 | Status: SHIPPED | OUTPATIENT
Start: 2018-09-06 | End: 2018-12-06 | Stop reason: ALTCHOICE

## 2018-09-06 ASSESSMENT — ENCOUNTER SYMPTOMS
PHOTOPHOBIA: 0
SHORTNESS OF BREATH: 0
NAUSEA: 0
COUGH: 0
RHINORRHEA: 0
SORE THROAT: 0
EYE PAIN: 0
ABDOMINAL PAIN: 0
WHEEZING: 0
VOMITING: 0

## 2018-09-06 NOTE — TELEPHONE ENCOUNTER
Spoke with patient , she did not receive her lab order when she left office. She stated she will come into office and  lab order tomorrow.

## 2018-09-06 NOTE — PATIENT INSTRUCTIONS
on the \"Sign Up Now\" link. Current as of: May 12, 2017  Content Version: 11.7  © 3156-0720 [employer]. Care instructions adapted under license by [facility] ([facility]). If you have questions about a medical condition or this instruction, always ask your healthcare professional. [place] [address] any warranty or liability for your use of this information.

## 2018-09-07 ENCOUNTER — HOSPITAL ENCOUNTER (OUTPATIENT)
Age: 38
Setting detail: SPECIMEN
Discharge: HOME | End: 2018-09-07
Payer: MEDICARE

## 2018-09-07 DIAGNOSIS — I10 ESSENTIAL HYPERTENSION: ICD-10-CM

## 2018-09-07 LAB
ANION GAP SERPL CALCULATED.3IONS-SCNC: 20 MMOL/L (ref 9–17)
BUN BLDV-MCNC: 19 MG/DL (ref 6–20)
BUN/CREAT BLD: ABNORMAL (ref 9–20)
CALCIUM SERPL-MCNC: 9 MG/DL (ref 8.6–10.4)
CHLORIDE BLD-SCNC: 99 MMOL/L (ref 98–107)
CO2: 19 MMOL/L (ref 20–31)
CREAT SERPL-MCNC: 0.59 MG/DL (ref 0.5–0.9)
GFR AFRICAN AMERICAN: >60 ML/MIN
GFR NON-AFRICAN AMERICAN: >60 ML/MIN
GFR SERPL CREATININE-BSD FRML MDRD: ABNORMAL ML/MIN/{1.73_M2}
GFR SERPL CREATININE-BSD FRML MDRD: ABNORMAL ML/MIN/{1.73_M2}
GLUCOSE BLD-MCNC: 128 MG/DL (ref 70–99)
POTASSIUM SERPL-SCNC: 4.3 MMOL/L (ref 3.7–5.3)
SODIUM BLD-SCNC: 138 MMOL/L (ref 135–144)

## 2018-09-13 ENCOUNTER — HOSPITAL ENCOUNTER (OUTPATIENT)
Age: 38
Setting detail: SPECIMEN
Discharge: HOME | End: 2018-09-13
Payer: MEDICARE

## 2018-09-14 LAB
DIRECT EXAM: NORMAL
Lab: NORMAL
SPECIMEN DESCRIPTION: NORMAL
STATUS: NORMAL

## 2018-09-26 PROBLEM — Z00.00 HEALTH CARE MAINTENANCE: Status: RESOLVED | Noted: 2017-03-23 | Resolved: 2018-09-26

## 2018-10-04 ENCOUNTER — OFFICE VISIT (OUTPATIENT)
Dept: FAMILY MEDICINE CLINIC | Age: 38
End: 2018-10-04
Payer: MEDICARE

## 2018-10-04 VITALS
HEART RATE: 121 BPM | BODY MASS INDEX: 34.93 KG/M2 | SYSTOLIC BLOOD PRESSURE: 132 MMHG | DIASTOLIC BLOOD PRESSURE: 82 MMHG | WEIGHT: 244 LBS | TEMPERATURE: 96.9 F | HEIGHT: 70 IN

## 2018-10-04 DIAGNOSIS — M62.838 NECK MUSCLE SPASM: ICD-10-CM

## 2018-10-04 DIAGNOSIS — E66.09 CLASS 2 OBESITY DUE TO EXCESS CALORIES WITH BODY MASS INDEX (BMI) OF 35.0 TO 35.9 IN ADULT, UNSPECIFIED WHETHER SERIOUS COMORBIDITY PRESENT: ICD-10-CM

## 2018-10-04 DIAGNOSIS — Z71.6 ENCOUNTER FOR SMOKING CESSATION COUNSELING: Primary | ICD-10-CM

## 2018-10-04 PROCEDURE — G8427 DOCREV CUR MEDS BY ELIG CLIN: HCPCS | Performed by: STUDENT IN AN ORGANIZED HEALTH CARE EDUCATION/TRAINING PROGRAM

## 2018-10-04 PROCEDURE — 99213 OFFICE O/P EST LOW 20 MIN: CPT | Performed by: STUDENT IN AN ORGANIZED HEALTH CARE EDUCATION/TRAINING PROGRAM

## 2018-10-04 PROCEDURE — 1036F TOBACCO NON-USER: CPT | Performed by: STUDENT IN AN ORGANIZED HEALTH CARE EDUCATION/TRAINING PROGRAM

## 2018-10-04 PROCEDURE — G8484 FLU IMMUNIZE NO ADMIN: HCPCS | Performed by: STUDENT IN AN ORGANIZED HEALTH CARE EDUCATION/TRAINING PROGRAM

## 2018-10-04 PROCEDURE — G8417 CALC BMI ABV UP PARAM F/U: HCPCS | Performed by: STUDENT IN AN ORGANIZED HEALTH CARE EDUCATION/TRAINING PROGRAM

## 2018-10-04 RX ORDER — VARENICLINE TARTRATE 25 MG
KIT ORAL
Qty: 1 EACH | Refills: 0 | Status: SHIPPED | OUTPATIENT
Start: 2018-10-04 | End: 2018-10-30 | Stop reason: ALTCHOICE

## 2018-10-04 RX ORDER — CYCLOBENZAPRINE HCL 5 MG
5 TABLET ORAL 3 TIMES DAILY PRN
Qty: 30 TABLET | Refills: 1 | Status: SHIPPED | OUTPATIENT
Start: 2018-10-04 | End: 2018-11-24 | Stop reason: SDUPTHER

## 2018-10-04 ASSESSMENT — ENCOUNTER SYMPTOMS
PHOTOPHOBIA: 0
COUGH: 0
RHINORRHEA: 0
ABDOMINAL PAIN: 0
SHORTNESS OF BREATH: 0
WHEEZING: 0
VOMITING: 0
EYE PAIN: 0
NAUSEA: 0
SORE THROAT: 0

## 2018-10-04 NOTE — PROGRESS NOTES
Attending Physician Statement  I have discussed the care of Carmen Necessary, including pertinent history and exam findings,  with the resident. I have reviewed the key elements of all parts of the encounter with the resident. I agree with the assessment, plan and orders as documented by the resident.   (GE Modifier)    Fatuma Lewis

## 2018-10-04 NOTE — PROGRESS NOTES
Subjective:    Jyoti Katz is a 45 y.o. female with  has a past medical history of Anxiety; Arthritis; Depression; H/O tubal ligation; Headache; High cholesterol; and Osteoarthritis. Family History   Problem Relation Age of Onset    Cancer Brother         kidney    High Blood Pressure Maternal Grandmother     Other Maternal Grandmother         aneurysm    High Blood Pressure Maternal Grandfather     High Blood Pressure Paternal Grandmother     High Blood Pressure Paternal Grandfather        Presented to the office today for:  Chief Complaint   Patient presents with    Medication Check     started nicotine patch and states it hasn't been working for her. HPI   Patient is a 45year old female here for smoking cessation counseling. Pt states she currently smokes 1PPD. Pt was last prescribed Nicotine patches, which did not help per the pt, and even caused her skin to burn. Pt interested in Chantix. Pt has never taken in the past. Discussed with pt to continue smoking while starting the medication for the first several days. Also discussed common side effects of medication. Review of Systems   Constitutional: Negative for chills and fever. HENT: Negative for congestion, rhinorrhea and sore throat. Eyes: Negative for photophobia and pain. Respiratory: Negative for cough, shortness of breath and wheezing. Cardiovascular: Negative for chest pain and palpitations. Gastrointestinal: Negative for abdominal pain, nausea and vomiting. Genitourinary: Negative for frequency and urgency. Musculoskeletal: Negative for arthralgias and myalgias. Neurological: Negative for dizziness, light-headedness and headaches.        Objective:    /82 (Site: Left Upper Arm, Position: Sitting, Cuff Size: Medium Adult) Comment: machine  Pulse 121   Temp 96.9 °F (36.1 °C) (Temporal)   Ht 5' 10\" (1.778 m)   Wt 244 lb (110.7 kg)   BMI 35.01 kg/m²    BP Readings from Last 3 Encounters:   10/04/18 importance of diet and exercise with the patient      Requested Prescriptions     Signed Prescriptions Disp Refills    cyclobenzaprine (FLEXERIL) 5 MG tablet 30 tablet 1     Sig: Take 1 tablet by mouth 3 times daily as needed for Muscle spasms    varenicline (CHANTIX STARTING MONTH PAK) 0.5 MG X 11 & 1 MG X 42 tablet 1 each 0     Sig: Take by mouth. Medications Discontinued During This Encounter   Medication Reason    cyclobenzaprine (FLEXERIL) 5 MG tablet London Dong received counseling on the following healthy behaviors: nutrition, exercise and medication adherence    Discussed use, benefit, and side effects of prescribed medications. Barriers to medication compliance addressed. All patient questions answered. Pt voiced understanding. No Follow-up on file.

## 2018-10-04 NOTE — PROGRESS NOTES
Visit Information    Have you changed or started any medications since your last visit including any over-the-counter medicines, vitamins, or herbal medicines? no   Have you stopped taking any of your medications? Is so, why? -  no  Are you having any side effects from any of your medications? - no    Have you seen any other physician or provider since your last visit?  no   Have you had any other diagnostic tests since your last visit? Yes,labs  Have you been seen in the emergency room and/or had an admission in a hospital since we last saw you?  no   Have you had your routine dental cleaning in the past 6 months?  no     Do you have an active MyChart account? If no, what is the barrier?   Yes    Patient Care Team:  Richie Gilmore MD as PCP - General (Family Medicine)  Waqar Ramos DO as PCP - S Attributed Provider    Medical History Review  Past Medical, Family, and Social History reviewed and does not contribute to the patient presenting condition    Health Maintenance   Topic Date Due    Flu vaccine (1) 10/12/2018 (Originally 9/1/2018)    Potassium monitoring  09/07/2019    Creatinine monitoring  09/07/2019    Cervical cancer screen  04/26/2021    DTaP/Tdap/Td vaccine (2 - Td) 03/23/2027    HIV screen  Completed

## 2018-10-30 RX ORDER — VARENICLINE TARTRATE 1 MG/1
TABLET, FILM COATED ORAL
Qty: 56 TABLET | Refills: 0 | Status: SHIPPED | OUTPATIENT
Start: 2018-10-30 | End: 2019-05-13

## 2018-10-30 NOTE — TELEPHONE ENCOUNTER
Please address the medication refill and close the encounter. If I can be of assistance, please route to the applicable pool. Thank you.     Next Visit Date:  Future Appointments  Date Time Provider Mallory Hubbard   12/6/2018 9:15 AM Marcos Welsh MD [address] [place] [address] Maintenance   Topic Date Due    Flu vaccine (1) 09/01/2018    Potassium monitoring  09/07/2019    Creatinine monitoring  09/07/2019    Cervical cancer screen  04/26/2021    DTaP/Tdap/Td vaccine (2 - Td) 03/23/2027    HIV screen  Completed       Hemoglobin A1C (%)   Date Value   07/13/2016 5.4             ( goal A1C is < 7)   No results found for: LABMICR  LDL Cholesterol (mg/dL)   Date Value   09/15/2017 35   07/13/2016 79       (goal LDL is <100)   AST (U/L)   Date Value   09/15/2017 19     ALT (U/L)   Date Value   08/08/2018 20     BUN (mg/dL)   Date Value   09/07/2018 19     BP Readings from Last 3 Encounters:   10/04/18 132/82   09/06/18 130/80   06/08/18 (!) 130/90          (goal 120/80)    All Future Testing planned in [facility]              Patient Active Problem List:     New persistent daily headache     Abnormal CT scan, head     Frequent headaches     Migraine without status migrainosus, not intractable     Class 2 obesity due to excess calories with body mass index (BMI) of 35.0 to 35.9 in adult     Neck muscle spasm     Encounter for smoking cessation counseling

## 2018-11-13 ENCOUNTER — HOSPITAL ENCOUNTER (OUTPATIENT)
Age: 38
Setting detail: SPECIMEN
Discharge: HOME | End: 2018-11-13
Payer: MEDICARE

## 2018-11-13 LAB
ABSOLUTE EOS #: 0.04 K/UL (ref 0–0.44)
ABSOLUTE IMMATURE GRANULOCYTE: 0.04 K/UL (ref 0–0.3)
ABSOLUTE LYMPH #: 1.67 K/UL (ref 1.1–3.7)
ABSOLUTE MONO #: 0.18 K/UL (ref 0.1–1.2)
ALT SERPL-CCNC: 30 U/L (ref 5–33)
BASOPHILS # BLD: 1 % (ref 0–2)
BASOPHILS ABSOLUTE: 0.04 K/UL (ref 0–0.2)
CREAT SERPL-MCNC: 0.79 MG/DL (ref 0.5–0.9)
DIFFERENTIAL TYPE: ABNORMAL
EOSINOPHILS RELATIVE PERCENT: 1 % (ref 1–4)
GFR AFRICAN AMERICAN: >60 ML/MIN
GFR NON-AFRICAN AMERICAN: >60 ML/MIN
GFR SERPL CREATININE-BSD FRML MDRD: NORMAL ML/MIN/{1.73_M2}
GFR SERPL CREATININE-BSD FRML MDRD: NORMAL ML/MIN/{1.73_M2}
HCT VFR BLD CALC: 41.4 % (ref 36.3–47.1)
HEMOGLOBIN: 13.6 G/DL (ref 11.9–15.1)
IMMATURE GRANULOCYTES: 1 %
LYMPHOCYTES # BLD: 21 % (ref 24–43)
MCH RBC QN AUTO: 33.6 PG (ref 25.2–33.5)
MCHC RBC AUTO-ENTMCNC: 32.9 G/DL (ref 28.4–34.8)
MCV RBC AUTO: 102.2 FL (ref 82.6–102.9)
MONOCYTES # BLD: 2 % (ref 3–12)
NRBC AUTOMATED: 0 PER 100 WBC
PDW BLD-RTO: 13.2 % (ref 11.8–14.4)
PLATELET # BLD: 294 K/UL (ref 138–453)
PLATELET ESTIMATE: ABNORMAL
PMV BLD AUTO: 10.1 FL (ref 8.1–13.5)
RBC # BLD: 4.05 M/UL (ref 3.95–5.11)
RBC # BLD: ABNORMAL 10*6/UL
SEG NEUTROPHILS: 74 % (ref 36–65)
SEGMENTED NEUTROPHILS ABSOLUTE COUNT: 5.93 K/UL (ref 1.5–8.1)
WBC # BLD: 7.9 K/UL (ref 3.5–11.3)
WBC # BLD: ABNORMAL 10*3/UL

## 2018-11-15 ENCOUNTER — TELEPHONE (OUTPATIENT)
Dept: FAMILY MEDICINE CLINIC | Age: 38
End: 2018-11-15

## 2018-11-19 ENCOUNTER — TELEPHONE (OUTPATIENT)
Dept: FAMILY MEDICINE CLINIC | Age: 38
End: 2018-11-19

## 2018-11-20 NOTE — TELEPHONE ENCOUNTER
Patient states she will keep scheduled appointment for 12/06/2018 @ 9:15am and discuss this at that time. Thank you.

## 2018-11-24 DIAGNOSIS — M62.838 NECK MUSCLE SPASM: ICD-10-CM

## 2018-11-26 RX ORDER — CYCLOBENZAPRINE HCL 5 MG
TABLET ORAL
Qty: 30 TABLET | Refills: 1 | Status: SHIPPED | OUTPATIENT
Start: 2018-11-26 | End: 2018-12-06 | Stop reason: SDUPTHER

## 2018-12-06 ENCOUNTER — OFFICE VISIT (OUTPATIENT)
Dept: FAMILY MEDICINE CLINIC | Age: 38
End: 2018-12-06
Payer: MEDICARE

## 2018-12-06 VITALS
WEIGHT: 248.4 LBS | SYSTOLIC BLOOD PRESSURE: 152 MMHG | BODY MASS INDEX: 35.56 KG/M2 | HEIGHT: 70 IN | TEMPERATURE: 97 F | DIASTOLIC BLOOD PRESSURE: 100 MMHG | HEART RATE: 121 BPM

## 2018-12-06 DIAGNOSIS — Z12.39 BREAST CANCER SCREENING: ICD-10-CM

## 2018-12-06 DIAGNOSIS — M62.838 NECK MUSCLE SPASM: ICD-10-CM

## 2018-12-06 DIAGNOSIS — I10 ESSENTIAL HYPERTENSION: Primary | ICD-10-CM

## 2018-12-06 PROCEDURE — 99213 OFFICE O/P EST LOW 20 MIN: CPT | Performed by: STUDENT IN AN ORGANIZED HEALTH CARE EDUCATION/TRAINING PROGRAM

## 2018-12-06 PROCEDURE — 99211 OFF/OP EST MAY X REQ PHY/QHP: CPT | Performed by: STUDENT IN AN ORGANIZED HEALTH CARE EDUCATION/TRAINING PROGRAM

## 2018-12-06 RX ORDER — CYCLOBENZAPRINE HCL 5 MG
TABLET ORAL
Qty: 30 TABLET | Refills: 1 | Status: SHIPPED | OUTPATIENT
Start: 2018-12-06 | End: 2019-02-01 | Stop reason: SDUPTHER

## 2018-12-06 RX ORDER — CHLORTHALIDONE 25 MG/1
25 TABLET ORAL DAILY
Qty: 30 TABLET | Refills: 3 | Status: SHIPPED | OUTPATIENT
Start: 2018-12-06 | End: 2019-03-19 | Stop reason: SDUPTHER

## 2018-12-06 ASSESSMENT — ENCOUNTER SYMPTOMS
WHEEZING: 0
COUGH: 0
NAUSEA: 0
VOMITING: 0
SHORTNESS OF BREATH: 0
RHINORRHEA: 0
EYE PAIN: 0
SORE THROAT: 0
ABDOMINAL PAIN: 0
PHOTOPHOBIA: 0

## 2018-12-06 ASSESSMENT — PATIENT HEALTH QUESTIONNAIRE - PHQ9
2. FEELING DOWN, DEPRESSED OR HOPELESS: 1
SUM OF ALL RESPONSES TO PHQ QUESTIONS 1-9: 2
SUM OF ALL RESPONSES TO PHQ QUESTIONS 1-9: 2
1. LITTLE INTEREST OR PLEASURE IN DOING THINGS: 1
SUM OF ALL RESPONSES TO PHQ9 QUESTIONS 1 & 2: 2

## 2018-12-29 ENCOUNTER — HOSPITAL ENCOUNTER (OUTPATIENT)
Dept: MAMMOGRAPHY | Age: 38
Discharge: HOME | End: 2018-12-31
Payer: MEDICARE

## 2018-12-29 DIAGNOSIS — Z12.39 BREAST CANCER SCREENING: ICD-10-CM

## 2018-12-29 PROCEDURE — 77063 BREAST TOMOSYNTHESIS BI: CPT

## 2019-01-09 ENCOUNTER — OFFICE VISIT (OUTPATIENT)
Dept: FAMILY MEDICINE CLINIC | Age: 39
End: 2019-01-09
Payer: MEDICARE

## 2019-01-09 VITALS
DIASTOLIC BLOOD PRESSURE: 100 MMHG | SYSTOLIC BLOOD PRESSURE: 136 MMHG | BODY MASS INDEX: 36.65 KG/M2 | TEMPERATURE: 97 F | HEIGHT: 70 IN | HEART RATE: 118 BPM | WEIGHT: 256 LBS

## 2019-01-09 DIAGNOSIS — I10 ESSENTIAL HYPERTENSION: Primary | ICD-10-CM

## 2019-01-09 PROCEDURE — 99213 OFFICE O/P EST LOW 20 MIN: CPT | Performed by: STUDENT IN AN ORGANIZED HEALTH CARE EDUCATION/TRAINING PROGRAM

## 2019-01-09 PROCEDURE — G8484 FLU IMMUNIZE NO ADMIN: HCPCS | Performed by: STUDENT IN AN ORGANIZED HEALTH CARE EDUCATION/TRAINING PROGRAM

## 2019-01-09 PROCEDURE — G8417 CALC BMI ABV UP PARAM F/U: HCPCS | Performed by: STUDENT IN AN ORGANIZED HEALTH CARE EDUCATION/TRAINING PROGRAM

## 2019-01-09 PROCEDURE — 1036F TOBACCO NON-USER: CPT | Performed by: STUDENT IN AN ORGANIZED HEALTH CARE EDUCATION/TRAINING PROGRAM

## 2019-01-09 PROCEDURE — G8428 CUR MEDS NOT DOCUMENT: HCPCS | Performed by: STUDENT IN AN ORGANIZED HEALTH CARE EDUCATION/TRAINING PROGRAM

## 2019-01-09 ASSESSMENT — ENCOUNTER SYMPTOMS
COUGH: 0
SHORTNESS OF BREATH: 0
ABDOMINAL PAIN: 0
NAUSEA: 0
WHEEZING: 0
SORE THROAT: 0
VOMITING: 0
PHOTOPHOBIA: 0
EYE PAIN: 0
RHINORRHEA: 0

## 2019-02-01 DIAGNOSIS — M62.838 NECK MUSCLE SPASM: ICD-10-CM

## 2019-02-02 RX ORDER — CYCLOBENZAPRINE HCL 5 MG
TABLET ORAL
Qty: 30 TABLET | Refills: 1 | Status: SHIPPED | OUTPATIENT
Start: 2019-02-02 | End: 2019-02-28 | Stop reason: SDUPTHER

## 2019-02-04 ENCOUNTER — OFFICE VISIT (OUTPATIENT)
Dept: FAMILY MEDICINE CLINIC | Age: 39
End: 2019-02-04
Payer: MEDICARE

## 2019-02-04 VITALS
HEIGHT: 70 IN | TEMPERATURE: 98 F | WEIGHT: 260 LBS | BODY MASS INDEX: 37.22 KG/M2 | SYSTOLIC BLOOD PRESSURE: 134 MMHG | HEART RATE: 124 BPM | DIASTOLIC BLOOD PRESSURE: 92 MMHG

## 2019-02-04 DIAGNOSIS — L91.8 SKIN TAG: Primary | ICD-10-CM

## 2019-02-04 PROCEDURE — 99213 OFFICE O/P EST LOW 20 MIN: CPT | Performed by: STUDENT IN AN ORGANIZED HEALTH CARE EDUCATION/TRAINING PROGRAM

## 2019-02-04 PROCEDURE — G8484 FLU IMMUNIZE NO ADMIN: HCPCS | Performed by: STUDENT IN AN ORGANIZED HEALTH CARE EDUCATION/TRAINING PROGRAM

## 2019-02-04 PROCEDURE — G8417 CALC BMI ABV UP PARAM F/U: HCPCS | Performed by: STUDENT IN AN ORGANIZED HEALTH CARE EDUCATION/TRAINING PROGRAM

## 2019-02-04 PROCEDURE — G8427 DOCREV CUR MEDS BY ELIG CLIN: HCPCS | Performed by: STUDENT IN AN ORGANIZED HEALTH CARE EDUCATION/TRAINING PROGRAM

## 2019-02-04 PROCEDURE — 1036F TOBACCO NON-USER: CPT | Performed by: STUDENT IN AN ORGANIZED HEALTH CARE EDUCATION/TRAINING PROGRAM

## 2019-02-04 ASSESSMENT — ENCOUNTER SYMPTOMS
NAUSEA: 0
ABDOMINAL PAIN: 0
SORE THROAT: 0
SHORTNESS OF BREATH: 0
PHOTOPHOBIA: 0
WHEEZING: 0
VOMITING: 0
COUGH: 0
EYE PAIN: 0
COLOR CHANGE: 0
RHINORRHEA: 0

## 2019-02-07 ENCOUNTER — HOSPITAL ENCOUNTER (OUTPATIENT)
Age: 39
Setting detail: SPECIMEN
Discharge: HOME | End: 2019-02-07
Payer: MEDICARE

## 2019-02-07 LAB
ABSOLUTE EOS #: 0.06 K/UL (ref 0–0.44)
ABSOLUTE IMMATURE GRANULOCYTE: 0.04 K/UL (ref 0–0.3)
ABSOLUTE LYMPH #: 2.15 K/UL (ref 1.1–3.7)
ABSOLUTE MONO #: 0.3 K/UL (ref 0.1–1.2)
ALT SERPL-CCNC: 40 U/L (ref 5–33)
BASOPHILS # BLD: 1 % (ref 0–2)
BASOPHILS ABSOLUTE: 0.06 K/UL (ref 0–0.2)
CREAT SERPL-MCNC: 0.67 MG/DL (ref 0.5–0.9)
DIFFERENTIAL TYPE: ABNORMAL
EOSINOPHILS RELATIVE PERCENT: 1 % (ref 1–4)
GFR AFRICAN AMERICAN: >60 ML/MIN
GFR NON-AFRICAN AMERICAN: >60 ML/MIN
GFR SERPL CREATININE-BSD FRML MDRD: NORMAL ML/MIN/{1.73_M2}
GFR SERPL CREATININE-BSD FRML MDRD: NORMAL ML/MIN/{1.73_M2}
HCT VFR BLD CALC: 39.8 % (ref 36.3–47.1)
HEMOGLOBIN: 13.5 G/DL (ref 11.9–15.1)
IMMATURE GRANULOCYTES: 1 %
LYMPHOCYTES # BLD: 42 % (ref 24–43)
MCH RBC QN AUTO: 33.6 PG (ref 25.2–33.5)
MCHC RBC AUTO-ENTMCNC: 33.9 G/DL (ref 28.4–34.8)
MCV RBC AUTO: 99 FL (ref 82.6–102.9)
MONOCYTES # BLD: 6 % (ref 3–12)
NRBC AUTOMATED: 0 PER 100 WBC
PDW BLD-RTO: 13.1 % (ref 11.8–14.4)
PLATELET # BLD: 319 K/UL (ref 138–453)
PLATELET ESTIMATE: ABNORMAL
PMV BLD AUTO: 10.6 FL (ref 8.1–13.5)
RBC # BLD: 4.02 M/UL (ref 3.95–5.11)
RBC # BLD: ABNORMAL 10*6/UL
SEG NEUTROPHILS: 49 % (ref 36–65)
SEGMENTED NEUTROPHILS ABSOLUTE COUNT: 2.54 K/UL (ref 1.5–8.1)
WBC # BLD: 5.2 K/UL (ref 3.5–11.3)
WBC # BLD: ABNORMAL 10*3/UL

## 2019-02-17 ENCOUNTER — HOSPITAL ENCOUNTER (EMERGENCY)
Age: 39
Discharge: HOME | End: 2019-02-17
Attending: EMERGENCY MEDICINE
Payer: MEDICARE

## 2019-02-17 VITALS
RESPIRATION RATE: 16 BRPM | HEART RATE: 121 BPM | BODY MASS INDEX: 36.59 KG/M2 | DIASTOLIC BLOOD PRESSURE: 133 MMHG | OXYGEN SATURATION: 98 % | WEIGHT: 255 LBS | TEMPERATURE: 97.7 F | SYSTOLIC BLOOD PRESSURE: 180 MMHG

## 2019-02-17 DIAGNOSIS — D22.9 CHANGE IN SKIN MOLE: Primary | ICD-10-CM

## 2019-02-17 PROCEDURE — 99283 EMERGENCY DEPT VISIT LOW MDM: CPT

## 2019-02-17 PROCEDURE — 11200 RMVL SKIN TAGS UP TO&INC 15: CPT

## 2019-02-17 PROCEDURE — 2500000003 HC RX 250 WO HCPCS: Performed by: EMERGENCY MEDICINE

## 2019-02-17 PROCEDURE — 88305 TISSUE EXAM BY PATHOLOGIST: CPT

## 2019-02-17 RX ORDER — LIDOCAINE HYDROCHLORIDE AND EPINEPHRINE 10; 10 MG/ML; UG/ML
20 INJECTION, SOLUTION INFILTRATION; PERINEURAL ONCE
Status: COMPLETED | OUTPATIENT
Start: 2019-02-17 | End: 2019-02-17

## 2019-02-17 RX ADMIN — LIDOCAINE HYDROCHLORIDE,EPINEPHRINE BITARTRATE 20 ML: 10; .01 INJECTION, SOLUTION INFILTRATION; PERINEURAL at 13:35

## 2019-02-17 ASSESSMENT — PAIN SCALES - GENERAL
PAINLEVEL_OUTOF10: 10
PAINLEVEL_OUTOF10: 10

## 2019-02-17 ASSESSMENT — PAIN DESCRIPTION - LOCATION: LOCATION: BACK

## 2019-02-17 ASSESSMENT — PAIN DESCRIPTION - ORIENTATION: ORIENTATION: UPPER

## 2019-02-17 ASSESSMENT — PAIN DESCRIPTION - PAIN TYPE: TYPE: ACUTE PAIN

## 2019-02-19 ENCOUNTER — TELEPHONE (OUTPATIENT)
Dept: FAMILY MEDICINE CLINIC | Age: 39
End: 2019-02-19

## 2019-02-19 LAB — DERMATOLOGY PATHOLOGY REPORT: NORMAL

## 2019-02-28 DIAGNOSIS — M62.838 NECK MUSCLE SPASM: ICD-10-CM

## 2019-02-28 RX ORDER — CYCLOBENZAPRINE HCL 5 MG
TABLET ORAL
Qty: 30 TABLET | Refills: 1 | Status: SHIPPED | OUTPATIENT
Start: 2019-02-28 | End: 2019-08-09 | Stop reason: ALTCHOICE

## 2019-03-19 DIAGNOSIS — I10 ESSENTIAL HYPERTENSION: ICD-10-CM

## 2019-03-20 RX ORDER — CHLORTHALIDONE 25 MG/1
TABLET ORAL
Qty: 30 TABLET | Refills: 3 | Status: SHIPPED | OUTPATIENT
Start: 2019-03-20 | End: 2019-09-06 | Stop reason: SDUPTHER

## 2019-03-26 ENCOUNTER — HOSPITAL ENCOUNTER (OUTPATIENT)
Age: 39
Setting detail: SPECIMEN
Discharge: HOME | End: 2019-03-26
Payer: MEDICARE

## 2019-03-26 ENCOUNTER — OFFICE VISIT (OUTPATIENT)
Dept: FAMILY MEDICINE CLINIC | Age: 39
End: 2019-03-26
Payer: MEDICARE

## 2019-03-26 VITALS
DIASTOLIC BLOOD PRESSURE: 87 MMHG | BODY MASS INDEX: 38.17 KG/M2 | HEART RATE: 106 BPM | SYSTOLIC BLOOD PRESSURE: 129 MMHG | HEIGHT: 70 IN | TEMPERATURE: 98.1 F | WEIGHT: 266.6 LBS

## 2019-03-26 DIAGNOSIS — M79.2 NEUROPATHIC PAIN OF FOOT, UNSPECIFIED LATERALITY: Primary | ICD-10-CM

## 2019-03-26 DIAGNOSIS — I10 ESSENTIAL HYPERTENSION: ICD-10-CM

## 2019-03-26 DIAGNOSIS — M79.2 NEUROPATHIC PAIN OF FOOT, UNSPECIFIED LATERALITY: ICD-10-CM

## 2019-03-26 LAB
FOLATE: 3 NG/ML
TSH SERPL DL<=0.05 MIU/L-ACNC: 3.17 MIU/L (ref 0.3–5)
VITAMIN B-12: 296 PG/ML (ref 232–1245)

## 2019-03-26 PROCEDURE — 99213 OFFICE O/P EST LOW 20 MIN: CPT | Performed by: STUDENT IN AN ORGANIZED HEALTH CARE EDUCATION/TRAINING PROGRAM

## 2019-03-26 PROCEDURE — 99211 OFF/OP EST MAY X REQ PHY/QHP: CPT | Performed by: STUDENT IN AN ORGANIZED HEALTH CARE EDUCATION/TRAINING PROGRAM

## 2019-03-26 ASSESSMENT — ENCOUNTER SYMPTOMS
STRIDOR: 0
WHEEZING: 0
DIARRHEA: 0
COLOR CHANGE: 0
BACK PAIN: 1
CONSTIPATION: 0
VOMITING: 0
CHEST TIGHTNESS: 0
ABDOMINAL DISTENTION: 0
SHORTNESS OF BREATH: 0
COUGH: 0
NAUSEA: 0
APNEA: 0
ABDOMINAL PAIN: 0

## 2019-03-26 ASSESSMENT — PATIENT HEALTH QUESTIONNAIRE - PHQ9
SUM OF ALL RESPONSES TO PHQ9 QUESTIONS 1 & 2: 0
1. LITTLE INTEREST OR PLEASURE IN DOING THINGS: 0
SUM OF ALL RESPONSES TO PHQ QUESTIONS 1-9: 0
2. FEELING DOWN, DEPRESSED OR HOPELESS: 0
SUM OF ALL RESPONSES TO PHQ QUESTIONS 1-9: 0

## 2019-03-27 LAB
ESTIMATED AVERAGE GLUCOSE: 148 MG/DL
HBA1C MFR BLD: 6.8 % (ref 4–6)

## 2019-03-28 ENCOUNTER — OFFICE VISIT (OUTPATIENT)
Dept: DERMATOLOGY | Age: 39
End: 2019-03-28
Payer: MEDICARE

## 2019-03-28 ENCOUNTER — HOSPITAL ENCOUNTER (OUTPATIENT)
Age: 39
Setting detail: SPECIMEN
Discharge: HOME | End: 2019-03-28
Payer: MEDICARE

## 2019-03-28 VITALS
DIASTOLIC BLOOD PRESSURE: 89 MMHG | WEIGHT: 267.2 LBS | SYSTOLIC BLOOD PRESSURE: 127 MMHG | BODY MASS INDEX: 38.25 KG/M2 | HEIGHT: 70 IN | HEART RATE: 112 BPM | OXYGEN SATURATION: 94 %

## 2019-03-28 DIAGNOSIS — B36.0 TINEA VERSICOLOR: ICD-10-CM

## 2019-03-28 DIAGNOSIS — D22.9 MULTIPLE NEVI: ICD-10-CM

## 2019-03-28 DIAGNOSIS — D22.9 IRRITATED NEVUS: Primary | ICD-10-CM

## 2019-03-28 PROCEDURE — G8417 CALC BMI ABV UP PARAM F/U: HCPCS | Performed by: DERMATOLOGY

## 2019-03-28 PROCEDURE — G8484 FLU IMMUNIZE NO ADMIN: HCPCS | Performed by: DERMATOLOGY

## 2019-03-28 PROCEDURE — 11300 SHAVE SKIN LESION 0.5 CM/<: CPT | Performed by: DERMATOLOGY

## 2019-03-28 PROCEDURE — 99203 OFFICE O/P NEW LOW 30 MIN: CPT | Performed by: DERMATOLOGY

## 2019-03-28 PROCEDURE — G8427 DOCREV CUR MEDS BY ELIG CLIN: HCPCS | Performed by: DERMATOLOGY

## 2019-03-28 PROCEDURE — 1036F TOBACCO NON-USER: CPT | Performed by: DERMATOLOGY

## 2019-03-28 RX ORDER — LIDOCAINE HYDROCHLORIDE AND EPINEPHRINE 10; 10 MG/ML; UG/ML
0.5 INJECTION, SOLUTION INFILTRATION; PERINEURAL ONCE
Status: COMPLETED | OUTPATIENT
Start: 2019-03-28 | End: 2019-03-28

## 2019-03-28 RX ADMIN — LIDOCAINE HYDROCHLORIDE AND EPINEPHRINE 0.5 ML: 10; 10 INJECTION, SOLUTION INFILTRATION; PERINEURAL at 08:23

## 2019-03-29 LAB — DERMATOLOGY PATHOLOGY REPORT: NORMAL

## 2019-04-12 ENCOUNTER — OFFICE VISIT (OUTPATIENT)
Dept: PODIATRY | Age: 39
End: 2019-04-12
Payer: MEDICARE

## 2019-04-12 ENCOUNTER — HOSPITAL ENCOUNTER (OUTPATIENT)
Dept: GENERAL RADIOLOGY | Age: 39
Discharge: HOME | End: 2019-04-14
Payer: MEDICARE

## 2019-04-12 ENCOUNTER — HOSPITAL ENCOUNTER (OUTPATIENT)
Age: 39
Discharge: HOME | End: 2019-04-14
Payer: MEDICARE

## 2019-04-12 VITALS
WEIGHT: 263 LBS | HEIGHT: 70 IN | HEART RATE: 99 BPM | BODY MASS INDEX: 37.65 KG/M2 | DIASTOLIC BLOOD PRESSURE: 104 MMHG | SYSTOLIC BLOOD PRESSURE: 144 MMHG

## 2019-04-12 DIAGNOSIS — M24.874 HYPERMOBILE JOINT SYNDROME OF BOTH FEET: ICD-10-CM

## 2019-04-12 DIAGNOSIS — M79.671 FOOT PAIN, BILATERAL: ICD-10-CM

## 2019-04-12 DIAGNOSIS — M79.672 FOOT PAIN, BILATERAL: Primary | ICD-10-CM

## 2019-04-12 DIAGNOSIS — M24.875 HYPERMOBILE JOINT SYNDROME OF BOTH FEET: ICD-10-CM

## 2019-04-12 DIAGNOSIS — M79.671 FOOT PAIN, BILATERAL: Primary | ICD-10-CM

## 2019-04-12 DIAGNOSIS — M79.672 FOOT PAIN, BILATERAL: ICD-10-CM

## 2019-04-12 PROCEDURE — 1036F TOBACCO NON-USER: CPT | Performed by: STUDENT IN AN ORGANIZED HEALTH CARE EDUCATION/TRAINING PROGRAM

## 2019-04-12 PROCEDURE — G8428 CUR MEDS NOT DOCUMENT: HCPCS | Performed by: STUDENT IN AN ORGANIZED HEALTH CARE EDUCATION/TRAINING PROGRAM

## 2019-04-12 PROCEDURE — 73630 X-RAY EXAM OF FOOT: CPT

## 2019-04-12 PROCEDURE — G8417 CALC BMI ABV UP PARAM F/U: HCPCS | Performed by: STUDENT IN AN ORGANIZED HEALTH CARE EDUCATION/TRAINING PROGRAM

## 2019-04-12 PROCEDURE — 99203 OFFICE O/P NEW LOW 30 MIN: CPT | Performed by: STUDENT IN AN ORGANIZED HEALTH CARE EDUCATION/TRAINING PROGRAM

## 2019-04-12 RX ORDER — CELECOXIB 200 MG/1
CAPSULE ORAL
Refills: 0 | COMMUNITY
Start: 2019-02-25 | End: 2019-10-18

## 2019-04-12 RX ORDER — PREDNISONE 10 MG/1
10 TABLET ORAL 3 TIMES DAILY
Qty: 20 TABLET | Refills: 0 | Status: SHIPPED | OUTPATIENT
Start: 2019-04-12 | End: 2019-04-19

## 2019-04-12 NOTE — PROGRESS NOTES
chlorthalidone (HYGROTON) 25 MG tablet take 1 tablet by mouth once daily 3/20/19  Yes Ganesh Murillo MD   cyclobenzaprine (FLEXERIL) 5 MG tablet take 1 tablet by mouth three times a day if needed for muscle spasm 2/28/19  Yes Ganesh Murillo MD   omeprazole (PRILOSEC) 20 MG delayed release capsule  4/5/18  Yes Historical Provider, MD   QUEtiapine (SEROQUEL) 400 MG tablet Take 100 mg by mouth daily    Yes Historical Provider, MD   buPROPion (WELLBUTRIN SR) 150 MG extended release tablet Take 150 mg by mouth 2 times daily   Yes Historical Provider, MD   celecoxib (CELEBREX) 200 MG capsule take 1 capsule by mouth once daily if needed 2/25/19   Historical Provider, MD   CHANTIX CONTINUING MONTH KVNG 1 MG tablet take as directed ON PACKAGE 10/30/18   Ganesh Murillo MD       Objective     Vitals:    04/12/19 1301   BP: (!) 144/104   Pulse: 99       Lab Results   Component Value Date    LABA1C 6.8 (H) 03/26/2019       Physical Exam:  General:  Alert and oriented x3. In no acute distress. Lower Extremity Physical Exam:    Vascular: DP and PT pulses are palpable, Bilateral. CFT <3 seconds to all digits, Bilateral.  No edema, Bilateral.  Hair growth is absent to the level of the digits, Bilateral.     Neuro: Saph/sural/SP/DP/plantar sensation intact to light touch. Protective sensation is intact to 10/10 sites as tested with a 5.07g SWMF, Bilateral.     Musculoskeletal: EHL/FHL/GS/TA gross motor intact. Tenderness to palpation of plantar metatarsal heads 1-5 bilateral and to the digits. Painful ROM of MPJ 1-5 bilateral. Gross deformity is present, bilateral HAV deformities. Dermatologic: No open lesions, Bilateral.  Interdigital maceration absent, Bilateral.  Tender mobile mass to the base of the left distal phalanx of the hallux. Nails 1-10 are within normal length, color, and thickness.       Biomechanical Exam:    Right foot: 1st MTPJ: Loaded:55 Unloaded:55  Right foot: 1st Ray: 10mm      Right foot: Ankle DF knee and physical examination of the patient and discussed management with the resident. I reviewed the residents note and agree with the documented findings and plan of care. Any areas of disagreement are noted on the chart. I was personally present for the key portions of any procedures. I have documented in the chart those procedures where I was not present during the key portions. I have reviewed the Podiatry Resident progress note. I agree with the chief complaint, past medical history, past surgical history, allergies, medications, social and family history as documented unless otherwise noted below. Documentation of the HPI, Physical Exam and Medical Decision Making performed by medical students or scribes is based on my personal performance of the HPI, PE and MDM. I have personally evaluated this patient and have completed at least one if not all key elements of the E/M (history, physical exam, and MDM). Additional findings are as noted.      Electronically signed by Arleen Vick DPM on 4/12/2019 at 4:01 PM

## 2019-04-18 ENCOUNTER — TELEPHONE (OUTPATIENT)
Dept: PODIATRY | Age: 39
End: 2019-04-18

## 2019-04-18 NOTE — TELEPHONE ENCOUNTER
Writer called pt to reschedule appointment due to the doctor not being in clinic on 4/29.  Pt rescheduled to 5/13 at 2:45 pm

## 2019-05-13 ENCOUNTER — OFFICE VISIT (OUTPATIENT)
Dept: PODIATRY | Age: 39
End: 2019-05-13
Payer: MEDICARE

## 2019-05-13 VITALS
HEIGHT: 70 IN | HEART RATE: 112 BPM | DIASTOLIC BLOOD PRESSURE: 113 MMHG | SYSTOLIC BLOOD PRESSURE: 166 MMHG | RESPIRATION RATE: 18 BRPM | WEIGHT: 261 LBS | BODY MASS INDEX: 37.37 KG/M2

## 2019-05-13 DIAGNOSIS — M79.672 FOOT PAIN, BILATERAL: Primary | ICD-10-CM

## 2019-05-13 DIAGNOSIS — M79.671 FOOT PAIN, BILATERAL: Primary | ICD-10-CM

## 2019-05-13 DIAGNOSIS — M77.50 TENDONITIS OF FOOT: ICD-10-CM

## 2019-05-13 PROCEDURE — G8427 DOCREV CUR MEDS BY ELIG CLIN: HCPCS | Performed by: STUDENT IN AN ORGANIZED HEALTH CARE EDUCATION/TRAINING PROGRAM

## 2019-05-13 PROCEDURE — 1036F TOBACCO NON-USER: CPT | Performed by: STUDENT IN AN ORGANIZED HEALTH CARE EDUCATION/TRAINING PROGRAM

## 2019-05-13 PROCEDURE — G8417 CALC BMI ABV UP PARAM F/U: HCPCS | Performed by: STUDENT IN AN ORGANIZED HEALTH CARE EDUCATION/TRAINING PROGRAM

## 2019-05-13 PROCEDURE — 99212 OFFICE O/P EST SF 10 MIN: CPT | Performed by: STUDENT IN AN ORGANIZED HEALTH CARE EDUCATION/TRAINING PROGRAM

## 2019-05-13 PROCEDURE — 99213 OFFICE O/P EST LOW 20 MIN: CPT | Performed by: STUDENT IN AN ORGANIZED HEALTH CARE EDUCATION/TRAINING PROGRAM

## 2019-05-13 NOTE — PROGRESS NOTES
Patient instructed to remove shoes and socks, instructed to sit in exam chair. Current PCP name is Ganesh Murillo MD and date of last visit 3/26/2019. Do you have a follow up visit scheduled? Yes. If yes the date is 5/20/2019.

## 2019-05-13 NOTE — PROGRESS NOTES
Taking? Authorizing Provider   celecoxib (CELEBREX) 200 MG capsule take 1 capsule by mouth once daily if needed 2/25/19  Yes Historical Provider, MD   chlorthalidone (HYGROTON) 25 MG tablet take 1 tablet by mouth once daily 3/20/19  Yes Lexii Harden MD   cyclobenzaprine (FLEXERIL) 5 MG tablet take 1 tablet by mouth three times a day if needed for muscle spasm 2/28/19  Yes Lexii Harden MD   omeprazole (PRILOSEC) 20 MG delayed release capsule  4/5/18  Yes Historical Provider, MD   QUEtiapine (SEROQUEL) 400 MG tablet Take 100 mg by mouth daily    Yes Historical Provider, MD   buPROPion (WELLBUTRIN SR) 150 MG extended release tablet Take 150 mg by mouth 2 times daily   Yes Historical Provider, MD       Objective     Vitals:    05/13/19 1445   BP: (!) 166/113   Pulse: 112   Resp: 18       Lab Results   Component Value Date    LABA1C 6.8 (H) 03/26/2019       Physical Exam:  General:  Alert and oriented x3. In no acute distress. Lower Extremity Physical Exam:    Vascular: DP and PT pulses are palpable, Bilateral. CFT <3 seconds to all digits, Bilateral.  No edema, Bilateral.  Hair growth is absent to the level of the digits, Bilateral.     Neuro: Saph/sural/SP/DP/plantar sensation intact to light touch. Protective sensation is intact to 10/10 sites as tested with a 5.07g SWMF, Bilateral.     Musculoskeletal: EHL/FHL/GS/TA gross motor intact. Pain on palpation of plantar IPJ of bilateral hallux and pain with plantar flexion of hallux against resistance. Painful ROM of MPJ 1-5 bilateral, hallux worse than digits. Gross deformity is present, bilateral HAV deformities. Dermatologic: No open lesions, Bilateral.  Interdigital maceration absent, Bilateral.  Tender mobile mass to the base of the left distal phalanx of the hallux. Nails 1-10 are within normal length, color, and thickness.       Biomechanical Exam:    Right foot: 1st MTPJ: Loaded:55 Unloaded:55  Right foot: 1st Ray: 10mm      Right foot: Ankle DF knee extended: 10  Right foot: Ankle DF knee flexed: 10    Left foot: 1st MTPJ: Loaded: 55 Unloaded: 55  Left foot: 1st Ray: 10mm  Left foot: Ankle DF knee extended: 10  Left foot: Ankle DF knee flexed: 10    Gait Analysis: No shoulder drop, no hip drop, rectus calcaneus. Imaging: No signs of osteoarthritis, normal joint spaces. Sandro Juan is a 44 y.o. female with     Diagnosis Orders   1. Foot pain, bilateral     2. Tendonitis of foot          Plan   · Patient examined and evaluated with Dr. Ti Cisneros  · Diagnosis and treatment options discussed in detail  · X-rays reviewed- No acute osseus deformity  · Rx Biomed Cream   · Dispensed 2 Surgical shoes to patient   · Recommended ICE and massage with biomed cream x4/day   · Patient to RTC in 4 week(s)  · Please, call the office with any questions or concerns     No orders of the defined types were placed in this encounter. No orders of the defined types were placed in this encounter.       Lucy Osuna DPM   Podiatric Medicine & Surgery   5/13/2019 at 3:53 PM

## 2019-06-14 ENCOUNTER — OFFICE VISIT (OUTPATIENT)
Dept: FAMILY MEDICINE CLINIC | Age: 39
End: 2019-06-14
Payer: MEDICARE

## 2019-06-14 VITALS
HEIGHT: 70 IN | SYSTOLIC BLOOD PRESSURE: 131 MMHG | WEIGHT: 260.6 LBS | BODY MASS INDEX: 37.31 KG/M2 | TEMPERATURE: 97.1 F | HEART RATE: 121 BPM | DIASTOLIC BLOOD PRESSURE: 89 MMHG

## 2019-06-14 DIAGNOSIS — I10 ESSENTIAL HYPERTENSION: Primary | ICD-10-CM

## 2019-06-14 DIAGNOSIS — K21.9 GASTROESOPHAGEAL REFLUX DISEASE WITHOUT ESOPHAGITIS: ICD-10-CM

## 2019-06-14 DIAGNOSIS — E11.9 TYPE 2 DIABETES MELLITUS WITHOUT COMPLICATION, WITHOUT LONG-TERM CURRENT USE OF INSULIN (HCC): ICD-10-CM

## 2019-06-14 PROCEDURE — 1036F TOBACCO NON-USER: CPT | Performed by: STUDENT IN AN ORGANIZED HEALTH CARE EDUCATION/TRAINING PROGRAM

## 2019-06-14 PROCEDURE — 99211 OFF/OP EST MAY X REQ PHY/QHP: CPT | Performed by: STUDENT IN AN ORGANIZED HEALTH CARE EDUCATION/TRAINING PROGRAM

## 2019-06-14 PROCEDURE — 2022F DILAT RTA XM EVC RTNOPTHY: CPT | Performed by: STUDENT IN AN ORGANIZED HEALTH CARE EDUCATION/TRAINING PROGRAM

## 2019-06-14 PROCEDURE — 99213 OFFICE O/P EST LOW 20 MIN: CPT | Performed by: STUDENT IN AN ORGANIZED HEALTH CARE EDUCATION/TRAINING PROGRAM

## 2019-06-14 PROCEDURE — 3044F HG A1C LEVEL LT 7.0%: CPT | Performed by: STUDENT IN AN ORGANIZED HEALTH CARE EDUCATION/TRAINING PROGRAM

## 2019-06-14 PROCEDURE — G8427 DOCREV CUR MEDS BY ELIG CLIN: HCPCS | Performed by: STUDENT IN AN ORGANIZED HEALTH CARE EDUCATION/TRAINING PROGRAM

## 2019-06-14 PROCEDURE — G8417 CALC BMI ABV UP PARAM F/U: HCPCS | Performed by: STUDENT IN AN ORGANIZED HEALTH CARE EDUCATION/TRAINING PROGRAM

## 2019-06-14 RX ORDER — PANTOPRAZOLE SODIUM 40 MG/1
40 TABLET, DELAYED RELEASE ORAL
Qty: 90 TABLET | Refills: 1 | Status: SHIPPED | OUTPATIENT
Start: 2019-06-14 | End: 2019-07-29 | Stop reason: ALTCHOICE

## 2019-06-14 ASSESSMENT — ENCOUNTER SYMPTOMS
PHOTOPHOBIA: 0
RHINORRHEA: 0
SORE THROAT: 0
NAUSEA: 0
ABDOMINAL PAIN: 0
EYE PAIN: 0
COUGH: 0
WHEEZING: 0
SHORTNESS OF BREATH: 0
VOMITING: 0

## 2019-06-14 NOTE — PROGRESS NOTES
Subjective:    Jose G Hooks is a 44 y.o. female with  has a past medical history of Anxiety, Arthritis, Class 2 obesity due to excess calories with body mass index (BMI) of 35.0 to 35.9 in adult, Depression, H/O tubal ligation, Headache, High cholesterol, and Osteoarthritis. Family History   Problem Relation Age of Onset   Oren Alfaro Cancer Brother         kidney    High Blood Pressure Maternal Grandmother     Other Maternal Grandmother         aneurysm    High Blood Pressure Maternal Grandfather     High Blood Pressure Paternal Grandmother     High Blood Pressure Paternal Grandfather        Presented tothe office today for:  Chief Complaint   Patient presents with    Hypertension       HPI   Patient is a 44year old female here for f/u of HTN, GERD symptoms and DM2. Pt is on Clorthalidone 25 mg daily. Pt denies any CP, SOB, HA, lightheadedness, or vision changes. Pt is working on diet and exercise for her DM. Last A1C was 6.8 in 3/2019. Will recheck in September 2019. Review of Systems   Constitutional: Negative for chills and fever. HENT: Negative for congestion, rhinorrhea and sore throat. Eyes: Negative for photophobia and pain. Respiratory: Negative for cough, shortness of breath and wheezing. Cardiovascular: Negative for chest pain and palpitations. Gastrointestinal: Negative for abdominal pain, nausea and vomiting. Genitourinary: Negative for frequency and urgency. Musculoskeletal: Negative for arthralgias and myalgias. Neurological: Negative for dizziness, light-headedness and headaches.        Objective:    /89 (Site: Right Upper Arm, Position: Sitting, Cuff Size: Large Adult)   Pulse 121   Temp 97.1 °F (36.2 °C) (Temporal)   Ht 5' 10\" (1.778 m)   Wt 260 lb 9.6 oz (118.2 kg)   BMI 37.39 kg/m²    BP Readings from Last 3 Encounters:   06/14/19 131/89   05/13/19 (!) 166/113   04/12/19 (!) 144/104       Physical Exam   Constitutional: She is oriented to person, place, and time. She appears well-developed. She is cooperative. No distress. HENT:   Head: Normocephalic and atraumatic. Neck: Neck supple. No tracheal deviation present. Cardiovascular: Normal rate and regular rhythm. Exam reveals no gallop and no friction rub. No murmur heard. Pulmonary/Chest: Effort normal and breath sounds normal. She has no wheezes. She has no rales. Abdominal: Soft. She exhibits no distension and no mass. There is no tenderness. Musculoskeletal: Normal range of motion. She exhibits no tenderness or deformity. Neurological: She is alert and oriented to person, place, and time. Skin: Skin is warm and dry. No rash noted. No erythema. Lab Results   Component Value Date    WBC 5.2 02/07/2019    HGB 13.5 02/07/2019    HCT 39.8 02/07/2019     02/07/2019    CHOL 157 09/15/2017    TRIG 266 (H) 09/15/2017    HDL 69 09/15/2017    ALT 40 (H) 02/07/2019    AST 19 09/15/2017     09/07/2018    K 4.3 09/07/2018    CL 99 09/07/2018    CREATININE 0.67 02/07/2019    BUN 19 09/07/2018    CO2 19 (L) 09/07/2018    TSH 3.17 03/26/2019    LABA1C 6.8 (H) 03/26/2019     Lab Results   Component Value Date    CALCIUM 9.0 09/07/2018     Lab Results   Component Value Date    LDLCHOLESTEROL 35 09/15/2017       Assessment and Plan:    1. Essential hypertension  - Continue on current medication regimen  - Basic Metabolic Panel; Future    2. Gastroesophageal reflux disease without esophagitis  - pantoprazole (PROTONIX) 40 MG tablet; Take 1 tablet by mouth every morning (before breakfast)  Dispense: 90 tablet; Refill: 1    3. Type 2 diabetes mellitus without complication, without long-term current use of insulin ([facility])  - Hemoglobin A1C; Future  - Lipid Panel;  Future  - Microalbumin, Ur; Future      Requested Prescriptions     Signed Prescriptions Disp Refills    pantoprazole (PROTONIX) 40 MG tablet 90 tablet 1     Sig: Take 1 tablet by mouth every morning (before breakfast)       Medications Discontinued During This Encounter   Medication Reason    omeprazole (PRILOSEC) 20 MG delayed release capsule Therapy completed       Ericaottoniel Pedro received counseling on the following healthy behaviors:nutrition, exercise and medication adherence    Discussed use, benefit, and side effects of prescribed medications. Barriers to medication compliance addressed. All patient questions answered. Pt voicedunderstanding. Return in about 6 weeks (around 7/26/2019) for gerd.

## 2019-06-14 NOTE — PATIENT INSTRUCTIONS
Patient Education        Gastroesophageal Reflux Disease (GERD): Care Instructions  Your Care Instructions    Gastroesophageal reflux disease (GERD) is the backward flow of stomach acid into the esophagus. The esophagus is the tube that leads from your throat to your stomach. A one-way valve prevents the stomach acid from moving up into this tube. When you have GERD, this valve does not close tightly enough. If you have mild GERD symptoms including heartburn, you may be able to control the problem with antacids or over-the-counter medicine. Changing your diet, losing weight, and making other lifestyle changes can also help reduce symptoms. Follow-up care is a key part of your treatment and safety. Be sure to make and go to all appointments, and call your doctor if you are having problems. It's also a good idea to know your test results and keep a list of the medicines you take. How can you care for yourself at home? · Take your medicines exactly as prescribed. Call your doctor if you think you are having a problem with your medicine. · Your doctor may recommend over-the-counter medicine. For mild or occasional indigestion, antacids, such as Tums, Gaviscon, Mylanta, or Maalox, may help. Your doctor also may recommend over-the-counter acid reducers, such as Pepcid AC, Tagamet HB, Zantac 75, or Prilosec. Read and follow all instructions on the label. If you use these medicines often, talk with your doctor. · Change your eating habits. ? It's best to eat several small meals instead of two or three large meals. ? After you eat, wait 2 to 3 hours before you lie down. ? Chocolate, mint, and alcohol can make GERD worse. ? Spicy foods, foods that have a lot of acid (like tomatoes and oranges), and coffee can make GERD symptoms worse in some people. If your symptoms are worse after you eat a certain food, you may want to stop eating that food to see if your symptoms get better.   · Do not smoke or chew tobacco. Smoking can make GERD worse. If you need help quitting, talk to your doctor about stop-smoking programs and medicines. These can increase your chances of quitting for good. · If you have GERD symptoms at night, raise the head of your bed 6 to 8 inches by putting the frame on blocks or placing a foam wedge under the head of your mattress. (Adding extra pillows does not work.)  · Do not wear tight clothing around your middle. · Lose weight if you need to. Losing just 5 to 10 pounds can help. When should you call for help? Call your doctor now or seek immediate medical care if:    · You have new or different belly pain.     · Your stools are black and tarlike or have streaks of blood.    Watch closely for changes in your health, and be sure to contact your doctor if:    · Your symptoms have not improved after 2 days.     · Food seems to catch in your throat or chest.   Where can you learn more? Go to [email or web address]://[employer].mPura. org and sign in to your [employer] account. Enter S990 in the [employer] box to learn more about \"Gastroesophageal Reflux Disease (GERD): Care Instructions. \"     If you do not have an account, please click on the \"Sign Up Now\" link. Current as of: November 7, 2018  Content Version: 12.0  © 6551-6945 Healthwise, Incorporated. Care instructions adapted under license by [facility] ([facility]). If you have questions about a medical condition or this instruction, always ask your healthcare professional. [place] [address] any warranty or liability for your use of this information.

## 2019-06-14 NOTE — PROGRESS NOTES
Visit Information    Have you changed or started any medications since your last visit including any over-the-counter medicines, vitamins, or herbal medicines? no   Have you stopped taking any of your medications? Is so, why? -  no  Are you having any side effects from any of your medications? - no    Have you seen any other physician or provider since your last visit?  no   Have you had any other diagnostic tests since your last visit?  no   Have you been seen in the emergency room and/or had an admission in a hospital since we last saw you?  no   Have you had your routine dental cleaning in the past 6 months?  no     Do you have an active MyChart account? If no, what is the barrier?   Yes    Patient Care Team:  Lexii Harden MD as PCP - General (Family Medicine)    Medical History Review  Past Medical, Family, and Social History reviewed and does not contribute to the patient presenting condition    Health Maintenance   Topic Date Due    Pneumococcal 0-64 years Vaccine (1 of 1 - PPSV23) 02/23/1986    Diabetic foot exam  02/23/1990    Diabetic retinal exam  02/23/1990    Varicella Vaccine (1 of 2 - 13+ 2-dose series) 02/23/1993    Diabetic microalbuminuria test  02/23/1998    Hepatitis B Vaccine (1 of 3 - Risk 3-dose series) 02/23/1999    Lipid screen  09/15/2018    Potassium monitoring  09/07/2019    Creatinine monitoring  02/07/2020    A1C test (Diabetic or Prediabetic)  03/26/2020    Cervical cancer screen  04/26/2021    DTaP/Tdap/Td vaccine (2 - Td) 03/23/2027    Flu vaccine  Completed    HIV screen  Completed

## 2019-06-14 NOTE — PROGRESS NOTES
Attending Physician Statement  I have discussed the care of Kaleigh Hung, 44 y.o. female,including pertinent history and exam findings,  with the resident Dr. Gavi Carrasco MD.  History:  Chief Complaint   Patient presents with    Hypertension     Patient is here for follow up on type II DM, Hypertension and symptoms of GERD. I have reviewed the key elements of the encounter with the resident. Examination was done by resident as documented in residents note. BP Readings from Last 3 Encounters:   06/14/19 131/89   05/13/19 (!) 166/113   04/12/19 (!) 144/104     /89 (Site: Right Upper Arm, Position: Sitting, Cuff Size: Large Adult)   Pulse 121   Temp 97.1 °F (36.2 °C) (Temporal)   Ht 5' 10\" (1.778 m)   Wt 260 lb 9.6 oz (118.2 kg)   BMI 37.39 kg/m²   Lab Results   Component Value Date    WBC 5.2 02/07/2019    HGB 13.5 02/07/2019    HCT 39.8 02/07/2019     02/07/2019    CHOL 157 09/15/2017    TRIG 266 (H) 09/15/2017    HDL 69 09/15/2017    ALT 40 (H) 02/07/2019    AST 19 09/15/2017     09/07/2018    K 4.3 09/07/2018    CL 99 09/07/2018    CREATININE 0.67 02/07/2019    BUN 19 09/07/2018    CO2 19 (L) 09/07/2018    TSH 3.17 03/26/2019    LABA1C 6.8 (H) 03/26/2019     Lab Results   Component Value Date    CALCIUM 9.0 09/07/2018     Lab Results   Component Value Date    LDLCHOLESTEROL 35 09/15/2017     I agree with the assessment, plan and diagnosis of    Diagnosis Orders   1. Essential hypertension  Basic Metabolic Panel   2. Gastroesophageal reflux disease without esophagitis  pantoprazole (PROTONIX) 40 MG tablet   3. Type 2 diabetes mellitus without complication, without long-term current use of insulin (HCC)  Hemoglobin A1C    Lipid Panel    Microalbumin, Ur     I agree with  orders as documented by the resident. Recommendations:   BP is controlled, physical exam is normal, labs updated and prescription provided for Protonix.  Follow up short term for recheck and to decide on further course of care. Return in about 6 weeks (around 7/26/2019) for gerd.    (Gagan Gotti ) Dr. Duke Hinojosa MD

## 2019-06-18 ENCOUNTER — HOSPITAL ENCOUNTER (OUTPATIENT)
Age: 39
Setting detail: SPECIMEN
Discharge: HOME | End: 2019-06-18
Payer: MEDICARE

## 2019-06-18 DIAGNOSIS — I10 ESSENTIAL HYPERTENSION: ICD-10-CM

## 2019-06-18 DIAGNOSIS — E11.9 TYPE 2 DIABETES MELLITUS WITHOUT COMPLICATION, WITHOUT LONG-TERM CURRENT USE OF INSULIN (HCC): ICD-10-CM

## 2019-06-18 LAB
ABSOLUTE EOS #: 0.06 K/UL (ref 0–0.44)
ABSOLUTE IMMATURE GRANULOCYTE: 0.09 K/UL (ref 0–0.3)
ABSOLUTE LYMPH #: 2.25 K/UL (ref 1.1–3.7)
ABSOLUTE MONO #: 0.39 K/UL (ref 0.1–1.2)
ALT SERPL-CCNC: 32 U/L (ref 5–33)
ANION GAP SERPL CALCULATED.3IONS-SCNC: 23 MMOL/L (ref 9–17)
BASOPHILS # BLD: 1 % (ref 0–2)
BASOPHILS ABSOLUTE: 0.05 K/UL (ref 0–0.2)
BUN BLDV-MCNC: 12 MG/DL (ref 6–20)
BUN/CREAT BLD: ABNORMAL (ref 9–20)
CALCIUM SERPL-MCNC: 9.3 MG/DL (ref 8.6–10.4)
CHLORIDE BLD-SCNC: 91 MMOL/L (ref 98–107)
CHOLESTEROL/HDL RATIO: 42.8
CHOLESTEROL: 385 MG/DL
CO2: 19 MMOL/L (ref 20–31)
CREAT SERPL-MCNC: 0.57 MG/DL (ref 0.5–0.9)
CREAT SERPL-MCNC: 0.57 MG/DL (ref 0.5–0.9)
CREATININE URINE: 129.7 MG/DL (ref 28–217)
DIFFERENTIAL TYPE: ABNORMAL
EOSINOPHILS RELATIVE PERCENT: 1 % (ref 1–4)
ESTIMATED AVERAGE GLUCOSE: 163 MG/DL
GFR AFRICAN AMERICAN: >60 ML/MIN
GFR AFRICAN AMERICAN: >60 ML/MIN
GFR NON-AFRICAN AMERICAN: >60 ML/MIN
GFR NON-AFRICAN AMERICAN: >60 ML/MIN
GFR SERPL CREATININE-BSD FRML MDRD: ABNORMAL ML/MIN/{1.73_M2}
GFR SERPL CREATININE-BSD FRML MDRD: ABNORMAL ML/MIN/{1.73_M2}
GFR SERPL CREATININE-BSD FRML MDRD: NORMAL ML/MIN/{1.73_M2}
GFR SERPL CREATININE-BSD FRML MDRD: NORMAL ML/MIN/{1.73_M2}
GLUCOSE BLD-MCNC: 210 MG/DL (ref 70–99)
HBA1C MFR BLD: 7.3 % (ref 4–6)
HCT VFR BLD CALC: 38.7 % (ref 36.3–47.1)
HDLC SERPL-MCNC: 9 MG/DL
HEMOGLOBIN: 13.2 G/DL (ref 11.9–15.1)
IMMATURE GRANULOCYTES: 2 %
LDL CHOLESTEROL DIRECT: 11 MG/DL
LDL CHOLESTEROL: ABNORMAL MG/DL (ref 0–130)
LYMPHOCYTES # BLD: 37 % (ref 24–43)
MCH RBC QN AUTO: 34.8 PG (ref 25.2–33.5)
MCHC RBC AUTO-ENTMCNC: 34.1 G/DL (ref 28.4–34.8)
MCV RBC AUTO: 102.1 FL (ref 82.6–102.9)
MICROALBUMIN/CREAT 24H UR: 22 MG/L
MICROALBUMIN/CREAT UR-RTO: 17 MCG/MG CREAT
MONOCYTES # BLD: 6 % (ref 3–12)
NRBC AUTOMATED: 0 PER 100 WBC
PDW BLD-RTO: 14.8 % (ref 11.8–14.4)
PLATELET # BLD: 320 K/UL (ref 138–453)
PLATELET ESTIMATE: ABNORMAL
PMV BLD AUTO: 10.4 FL (ref 8.1–13.5)
POTASSIUM SERPL-SCNC: 3.1 MMOL/L (ref 3.7–5.3)
RBC # BLD: 3.79 M/UL (ref 3.95–5.11)
RBC # BLD: ABNORMAL 10*6/UL
SEG NEUTROPHILS: 53 % (ref 36–65)
SEGMENTED NEUTROPHILS ABSOLUTE COUNT: 3.31 K/UL (ref 1.5–8.1)
SODIUM BLD-SCNC: 133 MMOL/L (ref 135–144)
TRIGL SERPL-MCNC: 4229 MG/DL
VLDLC SERPL CALC-MCNC: ABNORMAL MG/DL (ref 1–30)
WBC # BLD: 6.2 K/UL (ref 3.5–11.3)
WBC # BLD: ABNORMAL 10*3/UL

## 2019-07-19 ENCOUNTER — TELEPHONE (OUTPATIENT)
Dept: DERMATOLOGY | Age: 39
End: 2019-07-19

## 2019-07-29 ENCOUNTER — OFFICE VISIT (OUTPATIENT)
Dept: FAMILY MEDICINE CLINIC | Age: 39
End: 2019-07-29
Payer: MEDICARE

## 2019-07-29 VITALS
HEIGHT: 70 IN | BODY MASS INDEX: 36.73 KG/M2 | HEART RATE: 112 BPM | SYSTOLIC BLOOD PRESSURE: 132 MMHG | DIASTOLIC BLOOD PRESSURE: 89 MMHG | WEIGHT: 256.6 LBS

## 2019-07-29 DIAGNOSIS — E11.9 TYPE 2 DIABETES MELLITUS WITHOUT COMPLICATION, WITHOUT LONG-TERM CURRENT USE OF INSULIN (HCC): ICD-10-CM

## 2019-07-29 DIAGNOSIS — K21.9 GASTROESOPHAGEAL REFLUX DISEASE WITHOUT ESOPHAGITIS: Primary | ICD-10-CM

## 2019-07-29 DIAGNOSIS — E78.5 HYPERLIPIDEMIA, UNSPECIFIED HYPERLIPIDEMIA TYPE: ICD-10-CM

## 2019-07-29 PROCEDURE — G8427 DOCREV CUR MEDS BY ELIG CLIN: HCPCS | Performed by: STUDENT IN AN ORGANIZED HEALTH CARE EDUCATION/TRAINING PROGRAM

## 2019-07-29 PROCEDURE — 99213 OFFICE O/P EST LOW 20 MIN: CPT | Performed by: STUDENT IN AN ORGANIZED HEALTH CARE EDUCATION/TRAINING PROGRAM

## 2019-07-29 PROCEDURE — 2022F DILAT RTA XM EVC RTNOPTHY: CPT | Performed by: STUDENT IN AN ORGANIZED HEALTH CARE EDUCATION/TRAINING PROGRAM

## 2019-07-29 PROCEDURE — 1036F TOBACCO NON-USER: CPT | Performed by: STUDENT IN AN ORGANIZED HEALTH CARE EDUCATION/TRAINING PROGRAM

## 2019-07-29 PROCEDURE — G8417 CALC BMI ABV UP PARAM F/U: HCPCS | Performed by: STUDENT IN AN ORGANIZED HEALTH CARE EDUCATION/TRAINING PROGRAM

## 2019-07-29 PROCEDURE — 3045F PR MOST RECENT HEMOGLOBIN A1C LEVEL 7.0-9.0%: CPT | Performed by: STUDENT IN AN ORGANIZED HEALTH CARE EDUCATION/TRAINING PROGRAM

## 2019-07-29 RX ORDER — RANITIDINE 150 MG/1
150 TABLET ORAL 2 TIMES DAILY
Qty: 60 TABLET | Refills: 3 | Status: SHIPPED | OUTPATIENT
Start: 2019-07-29 | End: 2019-10-25 | Stop reason: ALTCHOICE

## 2019-07-29 RX ORDER — ATORVASTATIN CALCIUM 40 MG/1
40 TABLET, FILM COATED ORAL DAILY
Qty: 90 TABLET | Refills: 1 | Status: SHIPPED | OUTPATIENT
Start: 2019-07-29 | End: 2019-09-06 | Stop reason: SDUPTHER

## 2019-07-29 ASSESSMENT — ENCOUNTER SYMPTOMS
SORE THROAT: 0
COUGH: 0
ABDOMINAL PAIN: 0
EYE PAIN: 0
WHEEZING: 0
RHINORRHEA: 0
NAUSEA: 0
SHORTNESS OF BREATH: 0
PHOTOPHOBIA: 0
VOMITING: 0

## 2019-07-29 NOTE — PROGRESS NOTES
kg)   BMI 36.82 kg/m²    BP Readings from Last 3 Encounters:   07/29/19 132/89   06/14/19 131/89   05/13/19 (!) 166/113       Physical Exam   Constitutional: She is oriented to person, place, and time. She appears well-developed. She is cooperative. No distress. HENT:   Head: Normocephalic and atraumatic. Neck: Neck supple. No tracheal deviation present. Cardiovascular: Normal rate, regular rhythm and normal heart sounds. Exam reveals no gallop and no friction rub. No murmur heard. Pulmonary/Chest: Effort normal and breath sounds normal. She has no wheezes. She has no rales. Abdominal: Soft. She exhibits no distension and no mass. There is no tenderness. Musculoskeletal: Normal range of motion. Neurological: She is alert and oriented to person, place, and time. Skin: Skin is warm and dry. No rash noted. No erythema. Lab Results   Component Value Date    WBC 6.2 06/18/2019    HGB 13.2 06/18/2019    HCT 38.7 06/18/2019     06/18/2019    CHOL 385 (H) 06/18/2019    TRIG 4,229 (H) 06/18/2019    HDL 9 (L) 06/18/2019    LDLDIRECT 11 06/18/2019    ALT 32 06/18/2019    AST 19 09/15/2017     (L) 06/18/2019    K 3.1 (L) 06/18/2019    CL 91 (L) 06/18/2019    CREATININE 0.57 06/18/2019    BUN 12 06/18/2019    CO2 19 (L) 06/18/2019    TSH 3.17 03/26/2019    LABA1C 7.3 (H) 06/18/2019    LABMICR 17 06/18/2019     Lab Results   Component Value Date    CALCIUM 9.3 06/18/2019     Lab Results   Component Value Date    LDLCHOLESTEROL      06/18/2019    LDLDIRECT 11 06/18/2019       Assessment and Plan:    1. Gastroesophageal reflux disease without esophagitis  - ranitidine (ZANTAC) 150 MG tablet; Take 1 tablet by mouth 2 times daily  Dispense: 60 tablet; Refill: 3  - Continue to monitor    2. Hyperlipidemia, unspecified hyperlipidemia type  - atorvastatin (LIPITOR) 40 MG tablet; Take 1 tablet by mouth daily  Dispense: 90 tablet; Refill: 1    3.  Type 2 diabetes mellitus without complication, without long-term current use of insulin (HCC)  - Pt would like to hold off on starting Metformin at this time. Continue on diet and exercise  - Recheck A1C at next visit. If no improvement, pt agreeable to start      Requested Prescriptions     Signed Prescriptions Disp Refills    atorvastatin (LIPITOR) 40 MG tablet 90 tablet 1     Sig: Take 1 tablet by mouth daily    ranitidine (ZANTAC) 150 MG tablet 60 tablet 3     Sig: Take 1 tablet by mouth 2 times daily       Medications Discontinued During This Encounter   Medication Reason    pantoprazole (PROTONIX) 40 MG tablet Therapy completed       Shruthi Robb received counseling on the following healthy behaviors:nutrition, exercise and medication adherence    Discussed use, benefit, and side effects of prescribed medications. Barriers to medication compliance addressed. All patient questions answered. Pt voicedunderstanding. Return in about 7 weeks (around 9/16/2019) for DM,HTN.

## 2019-08-05 ENCOUNTER — OFFICE VISIT (OUTPATIENT)
Dept: PODIATRY | Age: 39
End: 2019-08-05
Payer: MEDICARE

## 2019-08-05 VITALS
HEIGHT: 70 IN | HEART RATE: 115 BPM | WEIGHT: 255 LBS | SYSTOLIC BLOOD PRESSURE: 143 MMHG | BODY MASS INDEX: 36.51 KG/M2 | DIASTOLIC BLOOD PRESSURE: 101 MMHG

## 2019-08-05 DIAGNOSIS — M89.8X7 EXOSTOSIS OF LEFT FOOT: Primary | ICD-10-CM

## 2019-08-05 DIAGNOSIS — M79.675 GREAT TOE PAIN, LEFT: ICD-10-CM

## 2019-08-05 PROCEDURE — G8417 CALC BMI ABV UP PARAM F/U: HCPCS | Performed by: STUDENT IN AN ORGANIZED HEALTH CARE EDUCATION/TRAINING PROGRAM

## 2019-08-05 PROCEDURE — G8427 DOCREV CUR MEDS BY ELIG CLIN: HCPCS | Performed by: STUDENT IN AN ORGANIZED HEALTH CARE EDUCATION/TRAINING PROGRAM

## 2019-08-05 PROCEDURE — 1036F TOBACCO NON-USER: CPT | Performed by: STUDENT IN AN ORGANIZED HEALTH CARE EDUCATION/TRAINING PROGRAM

## 2019-08-05 PROCEDURE — 99213 OFFICE O/P EST LOW 20 MIN: CPT | Performed by: STUDENT IN AN ORGANIZED HEALTH CARE EDUCATION/TRAINING PROGRAM

## 2019-08-05 PROCEDURE — 99212 OFFICE O/P EST SF 10 MIN: CPT | Performed by: STUDENT IN AN ORGANIZED HEALTH CARE EDUCATION/TRAINING PROGRAM

## 2019-08-05 NOTE — PROGRESS NOTES
Patient instructed to remove shoes and socks, instructed to sit in exam chair. Current PCP name is Areli Bose and date of last visit 7-29-19. Do you have a follow up visit scheduled?   Yes or no    If yes the date is 9-6-19

## 2019-08-08 ENCOUNTER — TELEPHONE (OUTPATIENT)
Dept: PODIATRY | Age: 39
End: 2019-08-08

## 2019-08-08 NOTE — TELEPHONE ENCOUNTER
Patient is scheduled for surgery on 8/15 at 8:00AM and for pre admit testing on 8/9 at 2:00PM at [facility]. Gave patient date, time and instructions. Patient confirmed and verbalized understanding.

## 2019-08-09 ENCOUNTER — HOSPITAL ENCOUNTER (OUTPATIENT)
Dept: PREADMISSION TESTING | Age: 39
Discharge: HOME | End: 2019-08-13
Payer: MEDICARE

## 2019-08-09 VITALS
BODY MASS INDEX: 36.61 KG/M2 | HEIGHT: 70 IN | WEIGHT: 255.73 LBS | RESPIRATION RATE: 18 BRPM | HEART RATE: 109 BPM | OXYGEN SATURATION: 98 % | DIASTOLIC BLOOD PRESSURE: 97 MMHG | SYSTOLIC BLOOD PRESSURE: 148 MMHG

## 2019-08-09 LAB
ANION GAP SERPL CALCULATED.3IONS-SCNC: 18 MMOL/L (ref 9–17)
BUN BLDV-MCNC: 19 MG/DL (ref 6–20)
BUN/CREAT BLD: 24 (ref 9–20)
CALCIUM SERPL-MCNC: 9.2 MG/DL (ref 8.6–10.4)
CHLORIDE BLD-SCNC: 97 MMOL/L (ref 98–107)
CO2: 22 MMOL/L (ref 20–31)
CREAT SERPL-MCNC: 0.78 MG/DL (ref 0.5–0.9)
GFR AFRICAN AMERICAN: >60 ML/MIN
GFR NON-AFRICAN AMERICAN: >60 ML/MIN
GFR SERPL CREATININE-BSD FRML MDRD: ABNORMAL ML/MIN/{1.73_M2}
GFR SERPL CREATININE-BSD FRML MDRD: ABNORMAL ML/MIN/{1.73_M2}
GLUCOSE BLD-MCNC: 138 MG/DL (ref 70–99)
HCT VFR BLD CALC: 37 % (ref 36.3–47.1)
HEMOGLOBIN: 12 G/DL (ref 11.9–15.1)
MCH RBC QN AUTO: 33.4 PG (ref 25.2–33.5)
MCHC RBC AUTO-ENTMCNC: 32.4 G/DL (ref 28.4–34.8)
MCV RBC AUTO: 103.1 FL (ref 82.6–102.9)
NRBC AUTOMATED: 0.2 PER 100 WBC
PDW BLD-RTO: 14.5 % (ref 11.8–14.4)
PLATELET # BLD: 301 K/UL (ref 138–453)
PMV BLD AUTO: 9.6 FL (ref 8.1–13.5)
POTASSIUM SERPL-SCNC: 3.7 MMOL/L (ref 3.7–5.3)
RBC # BLD: 3.59 M/UL (ref 3.95–5.11)
SODIUM BLD-SCNC: 137 MMOL/L (ref 135–144)
WBC # BLD: 8.1 K/UL (ref 3.5–11.3)

## 2019-08-09 PROCEDURE — 85027 COMPLETE CBC AUTOMATED: CPT

## 2019-08-09 PROCEDURE — 36415 COLL VENOUS BLD VENIPUNCTURE: CPT

## 2019-08-09 PROCEDURE — 93005 ELECTROCARDIOGRAM TRACING: CPT | Performed by: ANESTHESIOLOGY

## 2019-08-09 PROCEDURE — 80048 BASIC METABOLIC PNL TOTAL CA: CPT

## 2019-08-09 NOTE — H&P (VIEW-ONLY)
products    Social History:     Tobacco:    reports that she quit smoking about 10 months ago. She has never used smokeless tobacco.  Alcohol:      reports that she drinks alcohol. Drug Use:  reports that she does not use drugs. Family History:     Family History   Problem Relation Age of Onset    Cancer Brother         kidney    High Blood Pressure Maternal Grandmother     Other Maternal Grandmother         aneurysm    High Blood Pressure Maternal Grandfather     High Blood Pressure Paternal Grandmother     High Blood Pressure Paternal Grandfather        Review of Systems:     Positive and Negative as described in HPI. CONSTITUTIONAL:  negative for fevers, chills, sweats, fatigue, weight loss  HEENT:  WEARS CONTACT LENSES OR GLASSES  for vision, hearing changes, runny nose, throat pain  RESPIRATORY:  negative for shortness of breath, cough, congestion, wheezing. CARDIOVASCULAR:  negative for chest pain, palpitations. HAS HYPERTENSION   GASTROINTESTINAL:  negative for nausea, vomiting, diarrhea,HAS GERD  constipation, change in bowel habits, abdominal pain   GENITOURINARY:  negative for difficulty of urination, burning with urination, frequency   INTEGUMENT:  negative for rash, skin lesions, easy bruising   HEMATOLOGIC/LYMPHATIC:  negative for swelling/edema   ALLERGIC/IMMUNOLOGIC:  negative for urticaria , itching  ENDOCRINE: HAS TYPE 2 DIABETES , LAST HB A1C WAS 7.2 NO , hot or cold intolerance  MUSCULOSKELETAL:  HAD ORIF OF LEFT FOREARM WITH RETAINED HARDWARE negative joint pains, muscle aches, swelling of joints  NEUROLOGICAL:  negative for headaches, dizziness, lightheadedness, numbness, pain, tingling extremities  BEHAVIOR/PSYCH: HAS HISTORY OF TREATMENT  for depression, anxiety    Physical Exam:   BP (!) 148/97   Pulse 109   Resp 18   Ht 5' 10\" (1.778 m)   Wt 255 lb 11.7 oz (116 kg)   SpO2 98%   BMI 36.69 kg/m²   No LMP recorded.   [id number]  No results for input(s): POCGLU in the last 72

## 2019-08-09 NOTE — PRE-PROCEDURE INSTRUCTIONS
physician. You will need to shower at home the night before surgery and the morning of surgery with a special soap called chlorhexidine gluconate (CHG*). *Not to be used by people allergic to Chlorhexidine Gluconate (CHG). Following these instructions will help you be sure that your skin is clean before surgery. Instructions on cleaning your skin before surgery: The night before your surgery:      You will need to shower with warm water (not hot) and the CHG soap.  Use a clean wash cloth and a clean towel. Have clean clothes available to put on after the shower.    First wash your hair with regular shampoo. Rinse your hair and body thoroughly to remove the shampoo.  Wash your face and genital area (private parts) with your regular soap or water only. Thoroughly rinse your body with warm water from the neck down.  Turn water off to prevent rinsing the soap off too soon.  With a clean wet washcloth and half of the CHG soap in the bottle, lather your entire body from the neck down. Do not use CHG soap near your eyes or ears to avoid injury to those areas.  Wash thoroughly, paying special attention to the area where your surgery will be performed.  Wash your body gently for five (5) minutes. Avoid scrubbing your skin too hard.  Turn the water back on and rinse your body thoroughly.  Pat yourself dry with a clean, soft towel. Do not apply lotion, cream or powder.  Dress with clean freshly washed clothes. The morning of surgery:     Repeat shower following steps above - using remaining half of CHG soap in bottle. Patient Instructions:    [facility] If you are having any type of anesthesia you are to have nothing to eat or drink after midnight the night before your surgery.   This includes gum, hard candy, mints, water or smoking or chewing tobacco.  The only exception to this is a small sip of water to take with any morning dose of heart, blood pressure, or

## 2019-08-10 LAB
EKG ATRIAL RATE: 95 BPM
EKG P AXIS: 34 DEGREES
EKG P-R INTERVAL: 140 MS
EKG Q-T INTERVAL: 374 MS
EKG QRS DURATION: 94 MS
EKG QTC CALCULATION (BAZETT): 469 MS
EKG R AXIS: 14 DEGREES
EKG T AXIS: 6 DEGREES
EKG VENTRICULAR RATE: 95 BPM

## 2019-08-14 ENCOUNTER — ANESTHESIA EVENT (OUTPATIENT)
Dept: OPERATING ROOM | Age: 39
End: 2019-08-14
Payer: MEDICARE

## 2019-08-15 ENCOUNTER — ANESTHESIA (OUTPATIENT)
Dept: OPERATING ROOM | Age: 39
End: 2019-08-15
Payer: MEDICARE

## 2019-08-15 ENCOUNTER — HOSPITAL ENCOUNTER (OUTPATIENT)
Age: 39
Setting detail: OUTPATIENT SURGERY
Discharge: HOME | End: 2019-08-15
Attending: PODIATRIST | Admitting: PODIATRIST
Payer: MEDICARE

## 2019-08-15 VITALS
HEART RATE: 105 BPM | HEIGHT: 70 IN | RESPIRATION RATE: 21 BRPM | BODY MASS INDEX: 36.61 KG/M2 | WEIGHT: 255.73 LBS | SYSTOLIC BLOOD PRESSURE: 138 MMHG | DIASTOLIC BLOOD PRESSURE: 92 MMHG | OXYGEN SATURATION: 94 % | TEMPERATURE: 96.8 F

## 2019-08-15 VITALS
SYSTOLIC BLOOD PRESSURE: 120 MMHG | OXYGEN SATURATION: 93 % | RESPIRATION RATE: 17 BRPM | DIASTOLIC BLOOD PRESSURE: 63 MMHG

## 2019-08-15 DIAGNOSIS — G89.18 POST-OP PAIN: Primary | ICD-10-CM

## 2019-08-15 LAB
GLUCOSE BLD-MCNC: 177 MG/DL (ref 65–105)
HCG, PREGNANCY URINE (POC): NEGATIVE

## 2019-08-15 PROCEDURE — 81025 URINE PREGNANCY TEST: CPT

## 2019-08-15 PROCEDURE — 2580000003 HC RX 258: Performed by: ANESTHESIOLOGY

## 2019-08-15 PROCEDURE — 6360000002 HC RX W HCPCS: Performed by: STUDENT IN AN ORGANIZED HEALTH CARE EDUCATION/TRAINING PROGRAM

## 2019-08-15 PROCEDURE — 6360000002 HC RX W HCPCS: Performed by: NURSE ANESTHETIST, CERTIFIED REGISTERED

## 2019-08-15 PROCEDURE — 2580000003 HC RX 258: Performed by: STUDENT IN AN ORGANIZED HEALTH CARE EDUCATION/TRAINING PROGRAM

## 2019-08-15 PROCEDURE — 3600000002 HC SURGERY LEVEL 2 BASE: Performed by: PODIATRIST

## 2019-08-15 PROCEDURE — 7100000010 HC PHASE II RECOVERY - FIRST 15 MIN: Performed by: PODIATRIST

## 2019-08-15 PROCEDURE — 88304 TISSUE EXAM BY PATHOLOGIST: CPT

## 2019-08-15 PROCEDURE — 82947 ASSAY GLUCOSE BLOOD QUANT: CPT

## 2019-08-15 PROCEDURE — 3700000001 HC ADD 15 MINUTES (ANESTHESIA): Performed by: PODIATRIST

## 2019-08-15 PROCEDURE — 3700000000 HC ANESTHESIA ATTENDED CARE: Performed by: PODIATRIST

## 2019-08-15 PROCEDURE — 2709999900 HC NON-CHARGEABLE SUPPLY: Performed by: PODIATRIST

## 2019-08-15 PROCEDURE — 2500000003 HC RX 250 WO HCPCS: Performed by: PODIATRIST

## 2019-08-15 PROCEDURE — 7100000011 HC PHASE II RECOVERY - ADDTL 15 MIN: Performed by: PODIATRIST

## 2019-08-15 PROCEDURE — 3600000012 HC SURGERY LEVEL 2 ADDTL 15MIN: Performed by: PODIATRIST

## 2019-08-15 RX ORDER — ONDANSETRON 2 MG/ML
INJECTION INTRAMUSCULAR; INTRAVENOUS PRN
Status: DISCONTINUED | OUTPATIENT
Start: 2019-08-15 | End: 2019-08-15 | Stop reason: SDUPTHER

## 2019-08-15 RX ORDER — MEPERIDINE HYDROCHLORIDE 50 MG/ML
12.5 INJECTION INTRAMUSCULAR; INTRAVENOUS; SUBCUTANEOUS EVERY 5 MIN PRN
Status: DISCONTINUED | OUTPATIENT
Start: 2019-08-15 | End: 2019-08-15 | Stop reason: HOSPADM

## 2019-08-15 RX ORDER — PROPOFOL 10 MG/ML
INJECTION, EMULSION INTRAVENOUS PRN
Status: DISCONTINUED | OUTPATIENT
Start: 2019-08-15 | End: 2019-08-15 | Stop reason: SDUPTHER

## 2019-08-15 RX ORDER — PROPOFOL 10 MG/ML
INJECTION, EMULSION INTRAVENOUS CONTINUOUS PRN
Status: DISCONTINUED | OUTPATIENT
Start: 2019-08-15 | End: 2019-08-15 | Stop reason: SDUPTHER

## 2019-08-15 RX ORDER — FENTANYL CITRATE 50 UG/ML
INJECTION, SOLUTION INTRAMUSCULAR; INTRAVENOUS PRN
Status: DISCONTINUED | OUTPATIENT
Start: 2019-08-15 | End: 2019-08-15 | Stop reason: SDUPTHER

## 2019-08-15 RX ORDER — FENTANYL CITRATE 50 UG/ML
25 INJECTION, SOLUTION INTRAMUSCULAR; INTRAVENOUS EVERY 5 MIN PRN
Status: DISCONTINUED | OUTPATIENT
Start: 2019-08-15 | End: 2019-08-15 | Stop reason: HOSPADM

## 2019-08-15 RX ORDER — LIDOCAINE HYDROCHLORIDE 10 MG/ML
INJECTION, SOLUTION EPIDURAL; INFILTRATION; INTRACAUDAL; PERINEURAL PRN
Status: DISCONTINUED | OUTPATIENT
Start: 2019-08-15 | End: 2019-08-15 | Stop reason: ALTCHOICE

## 2019-08-15 RX ORDER — HYDROMORPHONE HCL 110MG/55ML
0.5 PATIENT CONTROLLED ANALGESIA SYRINGE INTRAVENOUS EVERY 5 MIN PRN
Status: DISCONTINUED | OUTPATIENT
Start: 2019-08-15 | End: 2019-08-15 | Stop reason: HOSPADM

## 2019-08-15 RX ORDER — HYDROCODONE BITARTRATE AND ACETAMINOPHEN 5; 325 MG/1; MG/1
1 TABLET ORAL EVERY 6 HOURS PRN
Qty: 28 TABLET | Refills: 0 | Status: SHIPPED | OUTPATIENT
Start: 2019-08-15 | End: 2019-08-22

## 2019-08-15 RX ORDER — MIDAZOLAM HYDROCHLORIDE 1 MG/ML
INJECTION INTRAMUSCULAR; INTRAVENOUS PRN
Status: DISCONTINUED | OUTPATIENT
Start: 2019-08-15 | End: 2019-08-15 | Stop reason: SDUPTHER

## 2019-08-15 RX ORDER — SODIUM CHLORIDE, SODIUM LACTATE, POTASSIUM CHLORIDE, CALCIUM CHLORIDE 600; 310; 30; 20 MG/100ML; MG/100ML; MG/100ML; MG/100ML
INJECTION, SOLUTION INTRAVENOUS CONTINUOUS
Status: DISCONTINUED | OUTPATIENT
Start: 2019-08-16 | End: 2019-08-15 | Stop reason: HOSPADM

## 2019-08-15 RX ORDER — LIDOCAINE HYDROCHLORIDE 10 MG/ML
1 INJECTION, SOLUTION EPIDURAL; INFILTRATION; INTRACAUDAL; PERINEURAL
Status: DISCONTINUED | OUTPATIENT
Start: 2019-08-16 | End: 2019-08-15 | Stop reason: HOSPADM

## 2019-08-15 RX ORDER — OXYCODONE HYDROCHLORIDE AND ACETAMINOPHEN 5; 325 MG/1; MG/1
2 TABLET ORAL PRN
Status: DISCONTINUED | OUTPATIENT
Start: 2019-08-15 | End: 2019-08-15 | Stop reason: HOSPADM

## 2019-08-15 RX ORDER — SODIUM CHLORIDE 0.9 % (FLUSH) 0.9 %
10 SYRINGE (ML) INJECTION PRN
Status: DISCONTINUED | OUTPATIENT
Start: 2019-08-15 | End: 2019-08-15 | Stop reason: HOSPADM

## 2019-08-15 RX ORDER — SODIUM CHLORIDE 9 MG/ML
INJECTION, SOLUTION INTRAVENOUS CONTINUOUS
Status: DISCONTINUED | OUTPATIENT
Start: 2019-08-16 | End: 2019-08-15

## 2019-08-15 RX ORDER — SODIUM CHLORIDE 0.9 % (FLUSH) 0.9 %
10 SYRINGE (ML) INJECTION EVERY 12 HOURS SCHEDULED
Status: DISCONTINUED | OUTPATIENT
Start: 2019-08-15 | End: 2019-08-15 | Stop reason: HOSPADM

## 2019-08-15 RX ORDER — DIPHENHYDRAMINE HYDROCHLORIDE 50 MG/ML
12.5 INJECTION INTRAMUSCULAR; INTRAVENOUS
Status: DISCONTINUED | OUTPATIENT
Start: 2019-08-15 | End: 2019-08-15 | Stop reason: HOSPADM

## 2019-08-15 RX ORDER — DEXAMETHASONE SODIUM PHOSPHATE 10 MG/ML
4 INJECTION INTRAMUSCULAR; INTRAVENOUS
Status: DISCONTINUED | OUTPATIENT
Start: 2019-08-15 | End: 2019-08-15 | Stop reason: HOSPADM

## 2019-08-15 RX ORDER — OXYCODONE HYDROCHLORIDE AND ACETAMINOPHEN 5; 325 MG/1; MG/1
1 TABLET ORAL PRN
Status: DISCONTINUED | OUTPATIENT
Start: 2019-08-15 | End: 2019-08-15 | Stop reason: HOSPADM

## 2019-08-15 RX ORDER — ONDANSETRON 2 MG/ML
4 INJECTION INTRAMUSCULAR; INTRAVENOUS
Status: DISCONTINUED | OUTPATIENT
Start: 2019-08-15 | End: 2019-08-15 | Stop reason: HOSPADM

## 2019-08-15 RX ORDER — KETOROLAC TROMETHAMINE 10 MG/1
10 TABLET, FILM COATED ORAL EVERY 6 HOURS PRN
Qty: 20 TABLET | Refills: 0 | Status: SHIPPED | OUTPATIENT
Start: 2019-08-15 | End: 2019-08-15 | Stop reason: HOSPADM

## 2019-08-15 RX ORDER — LABETALOL HYDROCHLORIDE 5 MG/ML
5 INJECTION, SOLUTION INTRAVENOUS EVERY 10 MIN PRN
Status: DISCONTINUED | OUTPATIENT
Start: 2019-08-15 | End: 2019-08-15 | Stop reason: HOSPADM

## 2019-08-15 RX ORDER — HYDRALAZINE HYDROCHLORIDE 20 MG/ML
5 INJECTION INTRAMUSCULAR; INTRAVENOUS EVERY 10 MIN PRN
Status: DISCONTINUED | OUTPATIENT
Start: 2019-08-15 | End: 2019-08-15 | Stop reason: HOSPADM

## 2019-08-15 RX ADMIN — PROPOFOL 50 MG: 10 INJECTION, EMULSION INTRAVENOUS at 08:56

## 2019-08-15 RX ADMIN — PROPOFOL 130 MCG/KG/MIN: 10 INJECTION, EMULSION INTRAVENOUS at 08:12

## 2019-08-15 RX ADMIN — MIDAZOLAM 2 MG: 1 INJECTION INTRAMUSCULAR; INTRAVENOUS at 08:07

## 2019-08-15 RX ADMIN — PROPOFOL 30 MG: 10 INJECTION, EMULSION INTRAVENOUS at 08:24

## 2019-08-15 RX ADMIN — PROPOFOL 30 MG: 10 INJECTION, EMULSION INTRAVENOUS at 08:17

## 2019-08-15 RX ADMIN — ONDANSETRON 4 MG: 2 INJECTION, SOLUTION INTRAMUSCULAR; INTRAVENOUS at 08:35

## 2019-08-15 RX ADMIN — PROPOFOL 30 MG: 10 INJECTION, EMULSION INTRAVENOUS at 08:18

## 2019-08-15 RX ADMIN — Medication 50 MCG: at 08:12

## 2019-08-15 RX ADMIN — CEFAZOLIN SODIUM 2 G: 10 INJECTION, POWDER, FOR SOLUTION INTRAVENOUS at 08:14

## 2019-08-15 RX ADMIN — PROPOFOL 30 MG: 10 INJECTION, EMULSION INTRAVENOUS at 08:15

## 2019-08-15 RX ADMIN — PROPOFOL 40 MG: 10 INJECTION, EMULSION INTRAVENOUS at 08:20

## 2019-08-15 RX ADMIN — PROPOFOL 30 MG: 10 INJECTION, EMULSION INTRAVENOUS at 08:12

## 2019-08-15 RX ADMIN — SODIUM CHLORIDE, POTASSIUM CHLORIDE, SODIUM LACTATE AND CALCIUM CHLORIDE: 600; 310; 30; 20 INJECTION, SOLUTION INTRAVENOUS at 07:00

## 2019-08-15 RX ADMIN — PROPOFOL 30 MG: 10 INJECTION, EMULSION INTRAVENOUS at 08:28

## 2019-08-15 RX ADMIN — SODIUM CHLORIDE, POTASSIUM CHLORIDE, SODIUM LACTATE AND CALCIUM CHLORIDE: 600; 310; 30; 20 INJECTION, SOLUTION INTRAVENOUS at 08:46

## 2019-08-15 RX ADMIN — Medication 50 MCG: at 08:14

## 2019-08-15 ASSESSMENT — PAIN - FUNCTIONAL ASSESSMENT: PAIN_FUNCTIONAL_ASSESSMENT: 0-10

## 2019-08-15 ASSESSMENT — PULMONARY FUNCTION TESTS
PIF_VALUE: 1
PIF_VALUE: 0
PIF_VALUE: 1
PIF_VALUE: 0
PIF_VALUE: 1
PIF_VALUE: 0
PIF_VALUE: 1
PIF_VALUE: 0
PIF_VALUE: 1
PIF_VALUE: 0
PIF_VALUE: 1

## 2019-08-15 ASSESSMENT — PAIN SCALES - GENERAL
PAINLEVEL_OUTOF10: 0
PAINLEVEL_OUTOF10: 0

## 2019-08-15 NOTE — INTERVAL H&P NOTE
History and Physical Update    Pt Name: Lam Hardwick  MRN: [id number]  YOB: 1980  Date of evaluation: 8/15/2019      [x] I have reviewed the H&P by Ever Amador CNP dated 8/9/19 which meets the criteria for an Interval History and Physical note. [x] I have examined  Lam Hardwick  There are no changes to the patient who is scheduled for a Excision benign soft tissue mass left hallux by Dr Dania Mejia for soft tissue mass. The patient denies health changes, fever, chills, productive cough, SOB, chest pain, open sores or wounds. Vital signs: BP (!) 160/100   Pulse 118   Temp 97.9 °F (36.6 °C) (Oral)   Resp 18   Ht 5' 10\" (1.778 m)   Wt 255 lb 11.7 oz (116 kg)   SpO2 94%   BMI 36.69 kg/m²     Allergies:  Iodine and Shellfish-derived products    Medications:    Prior to Admission medications    Medication Sig Start Date End Date Taking? Authorizing Provider   atorvastatin (LIPITOR) 40 MG tablet Take 1 tablet by mouth daily 7/29/19   Amarilis Aguilar MD   ranitidine (ZANTAC) 150 MG tablet Take 1 tablet by mouth 2 times daily 7/29/19   Amarilis Aguilar MD   celecoxib (CELEBREX) 200 MG capsule take 1 capsule by mouth once daily if needed 2/25/19   Historical Provider, MD   chlorthalidone (HYGROTON) 25 MG tablet take 1 tablet by mouth once daily 3/20/19   Amarilis Aguilar MD         This is a 44 y.o. female who is pleasant, cooperative, alert and oriented x3, in no acute distress. Mildly anxious this am.    Heart:  /100 Hx HTN  Heart sounds are normal.   tachycardic rate and regular rhythm without symptoms  No murmur, gallop or rub. Lungs: Normal respiratory effort with good air exchange, unlabored without wheezes or rales bilaterally   Abdomen: Round, obese, soft, nontender, nondistended with bowel sounds.        Labs:  Recent Labs     08/09/19  1426   HGB 12.0   HCT 37.0   WBC 8.1   .1*         K 3.7   CL 97*   CO2 22   BUN 19   CREATININE 0.78   GLUCOSE 138*

## 2019-08-15 NOTE — ANESTHESIA POSTPROCEDURE EVALUATION
Department of Anesthesiology  Postprocedure Note    Patient: Shelia Ren  MRN: [id number]  YOB: 1980  Date of evaluation: 8/15/2019  Time:  12:55 PM     Procedure Summary     Date:  08/15/19 Room / Location:  [facility] 06 / [facility]    Anesthesia Start:  1226 Anesthesia Stop:  6964    Procedure:  EXCISION LEFT HALLUX BENIGN SOFT TISSUE (Left Foot) Diagnosis:  (LEFT HALLUX BENIGN SOFT TISSUE)    Surgeon:  Debra Campbell DPM Responsible Provider:  Yudi Buck MD    Anesthesia Type:  General ASA Status:  3          Anesthesia Type: General    Gabrielle Phase I:      Gabrielle Phase II: Gabrielle Score: 10    Last vitals: Reviewed and per EMR flowsheets.        Anesthesia Post Evaluation    Patient location during evaluation: PACU  Patient participation: waiting for patient participation  Level of consciousness: awake and alert  Pain score: 2  Airway patency: patent  Nausea & Vomiting: no nausea and no vomiting  Complications: no  Cardiovascular status: blood pressure returned to baseline  Respiratory status: acceptable  Hydration status: euvolemic

## 2019-08-15 NOTE — ANESTHESIA PRE PROCEDURE
Department of Anesthesiology  Preprocedure Note       Name:  Frankie Maxwell   Age:  44 y.o.  :  1980                                          MRN:  [id number]         Date:  8/15/2019      Surgeon: John Goodwin):  Lisbet Love DPM    Procedure: EXCISION LEFT HALLUX BENIGN SOFT TISSUE (Left )    Medications prior to admission:   Prior to Admission medications    Medication Sig Start Date End Date Taking? Authorizing Provider   atorvastatin (LIPITOR) 40 MG tablet Take 1 tablet by mouth daily 19  Yes Rell Argueta MD   ranitidine (ZANTAC) 150 MG tablet Take 1 tablet by mouth 2 times daily 19  Yes Rell Argueta MD   chlorthalidone (HYGROTON) 25 MG tablet take 1 tablet by mouth once daily 3/20/19  Yes Rell Argueta MD   celecoxib (CELEBREX) 200 MG capsule take 1 capsule by mouth once daily if needed 19   Historical Provider, MD       Current medications:    Current Facility-Administered Medications   Medication Dose Route Frequency Provider Last Rate Last Dose    [START ON 2019] lactated ringers infusion   Intravenous Continuous Carrolyn Merritts,  mL/hr at 08/15/19 0700      sodium chloride flush 0.9 % injection 10 mL  10 mL Intravenous 2 times per day Nilay Dailey DO        sodium chloride flush 0.9 % injection 10 mL  10 mL Intravenous PRN Carrolyn Merritts, DO        [START ON 2019] lidocaine PF 1 % injection 1 mL  1 mL Intradermal Once PRN Carrolyn Merritts, DO           Allergies:     Allergies   Allergen Reactions    Iodine Anaphylaxis    Shellfish-Derived Products Anaphylaxis       Problem List:    Patient Active Problem List   Diagnosis Code    New persistent daily headache G44.52    Abnormal CT scan, head R93.0    Frequent headaches R51    Migraine without status migrainosus, not intractable G43.909    Class 2 obesity due to excess calories with body mass index (BMI) of 35.0 to 35.9 in adult E66.09, Z68.35    Neck muscle spasm M62.836    08/09/2019    RDW 14.5 08/09/2019     08/09/2019     12/20/2011       CMP:   Lab Results   Component Value Date     08/09/2019    K 3.7 08/09/2019    CL 97 08/09/2019    CO2 22 08/09/2019    BUN 19 08/09/2019    CREATININE 0.78 08/09/2019    GFRAA >60 08/09/2019    LABGLOM >60 08/09/2019    GLUCOSE 138 08/09/2019    GLUCOSE 86 12/20/2011    PROT 6.7 09/15/2017    CALCIUM 9.2 08/09/2019    BILITOT 0.43 09/15/2017    ALKPHOS 58 09/15/2017    AST 19 09/15/2017    ALT 32 06/18/2019       POC Tests:   Recent Labs     08/15/19  0658   POCGLU 177*       Coags: No results found for: PROTIME, INR, APTT    HCG (If Applicable):   Lab Results   Component Value Date    PREGTESTUR negative 03/06/2016    HCG NEGATIVE 03/06/2016        ABGs: No results found for: PHART, PO2ART, GZU5JKH, LDR3KZF, BEART, O9CKDHLA     Type & Screen (If Applicable):  No results found for: LABABO, [address]    Anesthesia Evaluation  Patient summary reviewed and Nursing notes reviewed  Airway: Mallampati: II  TM distance: >3 FB   Neck ROM: full  Mouth opening: > = 3 FB Dental: normal exam         Pulmonary:normal exam  breath sounds clear to auscultation                             Cardiovascular:  Exercise tolerance: good (>4 METS),           Rhythm: regular  Rate: normal                    Neuro/Psych:               GI/Hepatic/Renal:             Endo/Other:                     Abdominal:       Abdomen: soft. Vascular:                                        Anesthesia Plan      general and MAC     ASA 3           MIPS: Postoperative opioids intended and Prophylactic antiemetics administered.         Attending anesthesiologist reviewed and agrees with Judson Stinson MD   8/15/2019

## 2019-08-16 LAB — SURGICAL PATHOLOGY REPORT: NORMAL

## 2019-08-26 ENCOUNTER — OFFICE VISIT (OUTPATIENT)
Dept: PODIATRY | Age: 39
End: 2019-08-26
Payer: MEDICARE

## 2019-08-26 VITALS
WEIGHT: 250 LBS | BODY MASS INDEX: 35.79 KG/M2 | SYSTOLIC BLOOD PRESSURE: 143 MMHG | HEART RATE: 90 BPM | DIASTOLIC BLOOD PRESSURE: 98 MMHG | HEIGHT: 70 IN

## 2019-08-26 DIAGNOSIS — M79.675 GREAT TOE PAIN, LEFT: ICD-10-CM

## 2019-08-26 DIAGNOSIS — M10.072 IDIOPATHIC GOUT OF LEFT FOOT, UNSPECIFIED CHRONICITY: ICD-10-CM

## 2019-08-26 DIAGNOSIS — T81.509A POSTOPERATIVE FOREIGN BODY, INITIAL ENCOUNTER: Primary | ICD-10-CM

## 2019-08-26 PROCEDURE — 99212 OFFICE O/P EST SF 10 MIN: CPT | Performed by: STUDENT IN AN ORGANIZED HEALTH CARE EDUCATION/TRAINING PROGRAM

## 2019-08-26 PROCEDURE — 99024 POSTOP FOLLOW-UP VISIT: CPT | Performed by: STUDENT IN AN ORGANIZED HEALTH CARE EDUCATION/TRAINING PROGRAM

## 2019-09-04 ENCOUNTER — OFFICE VISIT (OUTPATIENT)
Dept: PODIATRY | Age: 39
End: 2019-09-04
Payer: MEDICARE

## 2019-09-04 VITALS
WEIGHT: 247 LBS | BODY MASS INDEX: 35.36 KG/M2 | HEART RATE: 96 BPM | SYSTOLIC BLOOD PRESSURE: 137 MMHG | DIASTOLIC BLOOD PRESSURE: 93 MMHG | HEIGHT: 70 IN

## 2019-09-04 DIAGNOSIS — M89.8X7 EXOSTOSIS OF LEFT FOOT: ICD-10-CM

## 2019-09-04 DIAGNOSIS — M10.072 IDIOPATHIC GOUT OF LEFT FOOT, UNSPECIFIED CHRONICITY: ICD-10-CM

## 2019-09-04 DIAGNOSIS — M79.675 GREAT TOE PAIN, LEFT: ICD-10-CM

## 2019-09-04 DIAGNOSIS — T81.509D: Primary | ICD-10-CM

## 2019-09-04 PROCEDURE — 99212 OFFICE O/P EST SF 10 MIN: CPT | Performed by: STUDENT IN AN ORGANIZED HEALTH CARE EDUCATION/TRAINING PROGRAM

## 2019-09-04 PROCEDURE — 99024 POSTOP FOLLOW-UP VISIT: CPT | Performed by: STUDENT IN AN ORGANIZED HEALTH CARE EDUCATION/TRAINING PROGRAM

## 2019-09-06 ENCOUNTER — OFFICE VISIT (OUTPATIENT)
Dept: FAMILY MEDICINE CLINIC | Age: 39
End: 2019-09-06
Payer: MEDICARE

## 2019-09-06 VITALS
BODY MASS INDEX: 35.24 KG/M2 | TEMPERATURE: 97.9 F | SYSTOLIC BLOOD PRESSURE: 118 MMHG | HEART RATE: 118 BPM | DIASTOLIC BLOOD PRESSURE: 81 MMHG | WEIGHT: 246.2 LBS | HEIGHT: 70 IN

## 2019-09-06 DIAGNOSIS — E11.9 TYPE 2 DIABETES MELLITUS WITHOUT COMPLICATION, WITHOUT LONG-TERM CURRENT USE OF INSULIN (HCC): Primary | ICD-10-CM

## 2019-09-06 DIAGNOSIS — E78.5 HYPERLIPIDEMIA, UNSPECIFIED HYPERLIPIDEMIA TYPE: ICD-10-CM

## 2019-09-06 DIAGNOSIS — I10 ESSENTIAL HYPERTENSION: ICD-10-CM

## 2019-09-06 LAB — HBA1C MFR BLD: 6 %

## 2019-09-06 PROCEDURE — 2022F DILAT RTA XM EVC RTNOPTHY: CPT | Performed by: STUDENT IN AN ORGANIZED HEALTH CARE EDUCATION/TRAINING PROGRAM

## 2019-09-06 PROCEDURE — 1036F TOBACCO NON-USER: CPT | Performed by: STUDENT IN AN ORGANIZED HEALTH CARE EDUCATION/TRAINING PROGRAM

## 2019-09-06 PROCEDURE — 83036 HEMOGLOBIN GLYCOSYLATED A1C: CPT | Performed by: STUDENT IN AN ORGANIZED HEALTH CARE EDUCATION/TRAINING PROGRAM

## 2019-09-06 PROCEDURE — 99213 OFFICE O/P EST LOW 20 MIN: CPT | Performed by: STUDENT IN AN ORGANIZED HEALTH CARE EDUCATION/TRAINING PROGRAM

## 2019-09-06 PROCEDURE — G8417 CALC BMI ABV UP PARAM F/U: HCPCS | Performed by: STUDENT IN AN ORGANIZED HEALTH CARE EDUCATION/TRAINING PROGRAM

## 2019-09-06 PROCEDURE — 3045F PR MOST RECENT HEMOGLOBIN A1C LEVEL 7.0-9.0%: CPT | Performed by: STUDENT IN AN ORGANIZED HEALTH CARE EDUCATION/TRAINING PROGRAM

## 2019-09-06 PROCEDURE — G8427 DOCREV CUR MEDS BY ELIG CLIN: HCPCS | Performed by: STUDENT IN AN ORGANIZED HEALTH CARE EDUCATION/TRAINING PROGRAM

## 2019-09-06 RX ORDER — CHLORTHALIDONE 25 MG/1
TABLET ORAL
Qty: 30 TABLET | Refills: 3 | Status: SHIPPED | OUTPATIENT
Start: 2019-09-06 | End: 2019-12-18 | Stop reason: SDUPTHER

## 2019-09-06 RX ORDER — ATORVASTATIN CALCIUM 40 MG/1
40 TABLET, FILM COATED ORAL DAILY
Qty: 90 TABLET | Refills: 1 | Status: SHIPPED | OUTPATIENT
Start: 2019-09-06 | End: 2019-12-18 | Stop reason: SDUPTHER

## 2019-09-06 ASSESSMENT — ENCOUNTER SYMPTOMS
ABDOMINAL PAIN: 0
VOMITING: 0
NAUSEA: 0
EYE PAIN: 0
COUGH: 0
PHOTOPHOBIA: 0
SORE THROAT: 0
RHINORRHEA: 0
WHEEZING: 0
SHORTNESS OF BREATH: 0

## 2019-09-06 NOTE — PROGRESS NOTES
Subjective:    Kaylyn Chu is a 44 y.o. female with  has a past medical history of Anxiety, Arthritis, Class 2 obesity due to excess calories with body mass index (BMI) of 35.0 to 35.9 in adult, Depression, Diabetes mellitus ([address] H/O tubal ligation, Headache, High cholesterol, and Osteoarthritis. Family History   Problem Relation Age of Onset   [address] [facility] Claudio Cancer Brother         kidney    High Blood Pressure Maternal Grandmother     Other Maternal Grandmother         aneurysm    High Blood Pressure Maternal Grandfather     High Blood Pressure Paternal Grandmother     High Blood Pressure Paternal Grandfather        Presented tothe office today for:  Chief Complaint   Patient presents with    Hypertension    Diabetes       HPI   Patient is a 44year old female here for follow up of DM and HTN. HTN is well controlled with Chlorthalidone 25 mg Daily. Pt is asymptomatic. Denies any CP, SOB, HA, lightheadedness, n/v or vision changes. DM- currently trying to control through diet and exercise. Pt is losing weight. Down 21 lbs since March 2019. Last A1C from 6/18/19 was 7.3. Following diabetic diet. A1C in the office today is 6. Review of Systems   Constitutional: Negative for chills and fever. HENT: Negative for congestion, rhinorrhea and sore throat. Eyes: Negative for photophobia and pain. Respiratory: Negative for cough, shortness of breath and wheezing. Cardiovascular: Negative for chest pain and palpitations. Gastrointestinal: Negative for abdominal pain, nausea and vomiting. Genitourinary: Negative for frequency and urgency. Musculoskeletal: Negative for arthralgias and myalgias. Neurological: Negative for dizziness, light-headedness and headaches.        Objective:    /81 (Site: Right Upper Arm, Position: Sitting)   Pulse 118   Temp 97.9 °F (36.6 °C) (Temporal)   Ht 5' 10\" (1.778 m)   Wt 246 lb 3.2 oz (111.7 kg)   LMP 08/16/2019   BMI 35.33 kg/m²    BP Readings from

## 2019-09-06 NOTE — PROGRESS NOTES
Visit Information    Have you changed or started any medications since your last visit including any over-the-counter medicines, vitamins, or herbal medicines? no   Have you stopped taking any of your medications? Is so, why? -  no  Are you having any side effects from any of your medications? - no    Have you seen any other physician or provider since your last visit?  no   Have you had any other diagnostic tests since your last visit?  no   Have you been seen in the emergency room and/or had an admission in a hospital since we last saw you?  no   Have you had your routine dental cleaning in the past 6 months?  no     Do you have an active MyChart account? If no, what is the barrier?   Yes    Patient Care Team:  Mallory Spurling, MD as PCP - General (Family Medicine)    Medical History Review  Past Medical, Family, and Social History reviewed and does not contribute to the patient presenting condition    Health Maintenance   Topic Date Due    Pneumococcal 0-64 years Vaccine (1 of 1 - PPSV23) 02/23/1986    Diabetic foot exam  02/23/1990    Diabetic retinal exam  02/23/1990    Hepatitis B Vaccine (1 of 3 - Risk 3-dose series) 02/23/1999    Flu vaccine (1) 09/01/2019    A1C test (Diabetic or Prediabetic)  06/18/2020    Diabetic microalbuminuria test  06/18/2020    Lipid screen  06/18/2020    Potassium monitoring  08/09/2020    Creatinine monitoring  08/09/2020    Cervical cancer screen  04/26/2021    DTaP/Tdap/Td vaccine (2 - Td) 03/23/2027    HIV screen  Completed    Varicella Vaccine  Discontinued

## 2019-09-18 ENCOUNTER — HOSPITAL ENCOUNTER (OUTPATIENT)
Dept: GENERAL RADIOLOGY | Age: 39
Discharge: HOME | End: 2019-09-20
Payer: MEDICARE

## 2019-09-18 ENCOUNTER — HOSPITAL ENCOUNTER (OUTPATIENT)
Age: 39
Discharge: HOME | End: 2019-09-20
Payer: MEDICARE

## 2019-09-18 ENCOUNTER — OFFICE VISIT (OUTPATIENT)
Dept: PODIATRY | Age: 39
End: 2019-09-18
Payer: MEDICARE

## 2019-09-18 VITALS
HEART RATE: 72 BPM | DIASTOLIC BLOOD PRESSURE: 87 MMHG | HEIGHT: 70 IN | BODY MASS INDEX: 34.93 KG/M2 | WEIGHT: 244 LBS | SYSTOLIC BLOOD PRESSURE: 137 MMHG

## 2019-09-18 DIAGNOSIS — M89.8X7 EXOSTOSIS OF LEFT FOOT: ICD-10-CM

## 2019-09-18 DIAGNOSIS — M10.072 IDIOPATHIC GOUT OF LEFT FOOT, UNSPECIFIED CHRONICITY: ICD-10-CM

## 2019-09-18 DIAGNOSIS — T81.509D: Primary | ICD-10-CM

## 2019-09-18 DIAGNOSIS — M79.675 GREAT TOE PAIN, LEFT: ICD-10-CM

## 2019-09-18 PROCEDURE — 99212 OFFICE O/P EST SF 10 MIN: CPT | Performed by: STUDENT IN AN ORGANIZED HEALTH CARE EDUCATION/TRAINING PROGRAM

## 2019-09-18 PROCEDURE — 99024 POSTOP FOLLOW-UP VISIT: CPT | Performed by: STUDENT IN AN ORGANIZED HEALTH CARE EDUCATION/TRAINING PROGRAM

## 2019-09-18 PROCEDURE — 73630 X-RAY EXAM OF FOOT: CPT

## 2019-09-18 NOTE — PROGRESS NOTES
Patient instructed to remove shoes and socks, instructed to sit in exam chair. Current PCP name is  and date of last visit 9/4/19. Do you have a follow up visit scheduled?   No

## 2019-09-18 NOTE — PROGRESS NOTES
 Osteoarthritis        Surgical History:   Past Surgical History:   Procedure Laterality Date    ARM SURGERY Left     FOOT NEUROMA SURGERY Left 8/15/2019    EXCISION LEFT HALLUX BENIGN SOFT TISSUE performed by Ginna Parker DPM at [address] forearm with plate 9 years ago    TOE SURGERY Left 08/15/2019    Excision of soft tissue mass, left hallux    TUBAL LIGATION  2004    WISDOM TOOTH EXTRACTION Bilateral        Social History:  Social History     Tobacco Use    Smoking status: Former Smoker     Last attempt to quit: 10/2018     Years since quittin.9    Smokeless tobacco: Never Used   Substance Use Topics    Alcohol use: Yes     Alcohol/week: 0.0 standard drinks     Comment: rare    Drug use: No       Medications:  Prior to Admission medications    Medication Sig Start Date End Date Taking? Authorizing Provider   chlorthalidone (HYGROTON) 25 MG tablet take 1 tablet by mouth once daily 19  Yes Denson Oppenheim, MD   atorvastatin (LIPITOR) 40 MG tablet Take 1 tablet by mouth daily 19  Yes Denson Oppenheim, MD   ranitidine (ZANTAC) 150 MG tablet Take 1 tablet by mouth 2 times daily 19  Yes Denson Oppenheim, MD   celecoxib (CELEBREX) 200 MG capsule take 1 capsule by mouth once daily if needed 19  Yes Historical Provider, MD       Objective     Vitals:    19 1417   BP: 137/87   Pulse: 72       Lab Results   Component Value Date    LABA1C 6.0 2019       Physical Exam:  General:  Alert and oriented x3. In no acute distress. Lower Extremity Physical Exam:    Vascular: DP and PT pulses are +2/4 palpable, Bilateral. CFT <3 seconds to all digits, Bilateral.  No edema, Bilateral.  Hair growth is present to the level of the digits, Bilateral.     Neuro: Saph/sural/SP/DP/plantar sensation intact to light touch. Protective sensation is intact to 10/10 sites as tested with a 5.07g SWMF, Bilateral.     Musculoskeletal: EHL/FHL/GS/TA gross motor intact. Minimal tenderness to palpation of left hallux dorsal distal phalanx. Dermatologic: Well coapted transverse incision overlying the left hallux IPJ with sutures intact. No periwound erythema or drainage noted. Interdigital maceration absent, Bilateral.  Nails 1-10 are wnl for length/thickness/color. Imagin19 L Foot XR:  Narrative   EXAMINATION:   3 XRAY VIEWS OF THE RIGHT FOOT; 3 XRAY VIEWS OF THE LEFT FOOT       2019 2:03 pm       COMPARISON:   None.       HISTORY:   ORDERING SYSTEM PROVIDED HISTORY: Foot pain, bilateral   TECHNOLOGIST PROVIDED HISTORY:   New onset pain forefoot       FINDINGS:   Right foot: No evidence of acute fracture.  There is normal alignment of the   tarsometatarsal joints.  No acute joint abnormality.  No focal osseous   lesion. No focal soft tissue abnormality.       Left foot: No evidence of acute fracture.  There is normal alignment of the   tarsometatarsal joints.  No acute joint abnormality.  No focal osseous   lesion. No focal soft tissue abnormality.           Impression   Negative examinations of the bilateral feet with no acute osseous abnormality   or arthritic change.         Assessment   Roopa Wilkins is a 44 y.o. female with     Diagnosis Orders   1. Post-op foreign body, subsequent encounter     2. Idiopathic gout of left foot, unspecified chronicity  Uric Acid     Plan   · Patient examined and evaluated. · Diagnosis and treatment options discussed in detail. · Discussed surgical findings- Gouty tophi. · My recommendation is that this patient be started on antigout medication long-term by her PCP. · Recommend uric acid level level. · Please, call the office with any questions or concerns. · Discussed with Dr. Olman Padilla. · Patient to return to clinic as needed. No orders of the defined types were placed in this encounter.     Dylan Fleming DPM   Podiatric Medicine & Surgery   2019 at 2:49 PM

## 2019-09-27 ENCOUNTER — OFFICE VISIT (OUTPATIENT)
Dept: FAMILY MEDICINE CLINIC | Age: 39
End: 2019-09-27
Payer: MEDICARE

## 2019-09-27 VITALS
BODY MASS INDEX: 35.05 KG/M2 | SYSTOLIC BLOOD PRESSURE: 132 MMHG | WEIGHT: 244.8 LBS | TEMPERATURE: 97.3 F | DIASTOLIC BLOOD PRESSURE: 90 MMHG | HEIGHT: 70 IN | HEART RATE: 94 BPM

## 2019-09-27 DIAGNOSIS — M10.9 ACUTE GOUT INVOLVING TOE OF LEFT FOOT, UNSPECIFIED CAUSE: Primary | ICD-10-CM

## 2019-09-27 DIAGNOSIS — E11.9 TYPE 2 DIABETES MELLITUS WITHOUT COMPLICATION, WITHOUT LONG-TERM CURRENT USE OF INSULIN (HCC): ICD-10-CM

## 2019-09-27 PROCEDURE — 2022F DILAT RTA XM EVC RTNOPTHY: CPT | Performed by: STUDENT IN AN ORGANIZED HEALTH CARE EDUCATION/TRAINING PROGRAM

## 2019-09-27 PROCEDURE — G8417 CALC BMI ABV UP PARAM F/U: HCPCS | Performed by: STUDENT IN AN ORGANIZED HEALTH CARE EDUCATION/TRAINING PROGRAM

## 2019-09-27 PROCEDURE — 3044F HG A1C LEVEL LT 7.0%: CPT | Performed by: STUDENT IN AN ORGANIZED HEALTH CARE EDUCATION/TRAINING PROGRAM

## 2019-09-27 PROCEDURE — 1036F TOBACCO NON-USER: CPT | Performed by: STUDENT IN AN ORGANIZED HEALTH CARE EDUCATION/TRAINING PROGRAM

## 2019-09-27 PROCEDURE — 99213 OFFICE O/P EST LOW 20 MIN: CPT | Performed by: STUDENT IN AN ORGANIZED HEALTH CARE EDUCATION/TRAINING PROGRAM

## 2019-09-27 PROCEDURE — G8427 DOCREV CUR MEDS BY ELIG CLIN: HCPCS | Performed by: STUDENT IN AN ORGANIZED HEALTH CARE EDUCATION/TRAINING PROGRAM

## 2019-09-27 RX ORDER — PREDNISONE 20 MG/1
40 TABLET ORAL DAILY
Qty: 10 TABLET | Refills: 0 | Status: SHIPPED | OUTPATIENT
Start: 2019-09-27 | End: 2019-10-02

## 2019-09-27 RX ORDER — NAPROXEN 250 MG/1
500 TABLET ORAL 2 TIMES DAILY WITH MEALS
Qty: 28 TABLET | Refills: 0 | Status: CANCELLED | OUTPATIENT
Start: 2019-09-27 | End: 2019-10-04

## 2019-09-27 ASSESSMENT — ENCOUNTER SYMPTOMS
VOMITING: 0
SHORTNESS OF BREATH: 0
SORE THROAT: 0
WHEEZING: 0
ABDOMINAL PAIN: 0
NAUSEA: 0
COUGH: 0
PHOTOPHOBIA: 0
RHINORRHEA: 0
EYE PAIN: 0

## 2019-09-28 NOTE — PROGRESS NOTES
Subjective:    Frankie Maxwell is a 44 y.o. female with  has a past medical history of Anxiety, Arthritis, Class 2 obesity due to excess calories with body mass index (BMI) of 35.0 to 35.9 in adult, Depression, Diabetes mellitus ([address] H/O tubal ligation, Headache, High cholesterol, and Osteoarthritis. Family History   Problem Relation Age of Onset    Cancer Brother         kidney    High Blood Pressure Maternal Grandmother     Other Maternal Grandmother         aneurysm    High Blood Pressure Maternal Grandfather     High Blood Pressure Paternal Grandmother     High Blood Pressure Paternal Grandfather        Presented tothe office today for:  Chief Complaint   Patient presents with    Gout       HPI   Patient is a 44year old female here for gout. Pt recently had procedure to remove gouty tophi from her left great toe. Pt was told to follow up with her PCP for further treatment. Pt states that her left great toe is occasionally warm, and is still painful to touch. Pt continues to stay away from red meats and alcohol. Review of Systems   Constitutional: Negative for chills and fever. HENT: Negative for congestion, rhinorrhea and sore throat. Eyes: Negative for photophobia and pain. Respiratory: Negative for cough, shortness of breath and wheezing. Cardiovascular: Negative for chest pain and palpitations. Gastrointestinal: Negative for abdominal pain, nausea and vomiting. Genitourinary: Negative for frequency and urgency. Musculoskeletal: Negative for arthralgias and myalgias. Pain on left great toe   Neurological: Negative for dizziness, light-headedness and headaches.        Objective:    BP (!) 132/90 (Site: Left Upper Arm, Position: Sitting, Cuff Size: Large Adult)   Pulse 94   Temp 97.3 °F (36.3 °C) (Temporal)   Ht 5' 10\" (1.778 m)   Wt 244 lb 12.8 oz (111 kg)   BMI 35.13 kg/m²    BP Readings from Last 3 Encounters:   09/27/19 (!) 132/90   09/18/19 137/87   09/06/19

## 2019-10-04 ENCOUNTER — HOSPITAL ENCOUNTER (OUTPATIENT)
Age: 39
Setting detail: SPECIMEN
Discharge: HOME | End: 2019-10-04
Payer: MEDICARE

## 2019-10-04 ENCOUNTER — OFFICE VISIT (OUTPATIENT)
Dept: FAMILY MEDICINE CLINIC | Age: 39
End: 2019-10-04
Payer: MEDICARE

## 2019-10-04 VITALS
BODY MASS INDEX: 34.65 KG/M2 | HEIGHT: 70 IN | SYSTOLIC BLOOD PRESSURE: 138 MMHG | TEMPERATURE: 97.3 F | DIASTOLIC BLOOD PRESSURE: 99 MMHG | WEIGHT: 242 LBS | HEART RATE: 103 BPM

## 2019-10-04 DIAGNOSIS — I10 ESSENTIAL HYPERTENSION: Primary | ICD-10-CM

## 2019-10-04 DIAGNOSIS — M10.9 ACUTE GOUT INVOLVING TOE OF LEFT FOOT, UNSPECIFIED CAUSE: ICD-10-CM

## 2019-10-04 DIAGNOSIS — M1A.9XX1 GOUT TOPHI: ICD-10-CM

## 2019-10-04 DIAGNOSIS — E66.09 CLASS 2 OBESITY DUE TO EXCESS CALORIES WITH BODY MASS INDEX (BMI) OF 35.0 TO 35.9 IN ADULT, UNSPECIFIED WHETHER SERIOUS COMORBIDITY PRESENT: ICD-10-CM

## 2019-10-04 LAB
ABSOLUTE EOS #: 0.06 K/UL (ref 0–0.44)
ABSOLUTE IMMATURE GRANULOCYTE: 0.06 K/UL (ref 0–0.3)
ABSOLUTE LYMPH #: 2.14 K/UL (ref 1.1–3.7)
ABSOLUTE MONO #: 0.37 K/UL (ref 0.1–1.2)
ALT SERPL-CCNC: 49 U/L (ref 5–33)
BASOPHILS # BLD: 1 % (ref 0–2)
BASOPHILS ABSOLUTE: 0.04 K/UL (ref 0–0.2)
CREAT SERPL-MCNC: 0.85 MG/DL (ref 0.5–0.9)
DIFFERENTIAL TYPE: ABNORMAL
EOSINOPHILS RELATIVE PERCENT: 1 % (ref 1–4)
GFR AFRICAN AMERICAN: >60 ML/MIN
GFR NON-AFRICAN AMERICAN: >60 ML/MIN
GFR SERPL CREATININE-BSD FRML MDRD: NORMAL ML/MIN/{1.73_M2}
GFR SERPL CREATININE-BSD FRML MDRD: NORMAL ML/MIN/{1.73_M2}
HCT VFR BLD CALC: 44.1 % (ref 36.3–47.1)
HEMOGLOBIN: 13.8 G/DL (ref 11.9–15.1)
IMMATURE GRANULOCYTES: 1 %
LYMPHOCYTES # BLD: 33 % (ref 24–43)
MCH RBC QN AUTO: 32.2 PG (ref 25.2–33.5)
MCHC RBC AUTO-ENTMCNC: 31.3 G/DL (ref 28.4–34.8)
MCV RBC AUTO: 102.8 FL (ref 82.6–102.9)
MONOCYTES # BLD: 6 % (ref 3–12)
NRBC AUTOMATED: 0 PER 100 WBC
PDW BLD-RTO: 14.3 % (ref 11.8–14.4)
PLATELET # BLD: 349 K/UL (ref 138–453)
PLATELET ESTIMATE: ABNORMAL
PMV BLD AUTO: 10.2 FL (ref 8.1–13.5)
RBC # BLD: 4.29 M/UL (ref 3.95–5.11)
RBC # BLD: ABNORMAL 10*6/UL
SEG NEUTROPHILS: 58 % (ref 36–65)
SEGMENTED NEUTROPHILS ABSOLUTE COUNT: 3.87 K/UL (ref 1.5–8.1)
URIC ACID: 11.5 MG/DL (ref 2.4–5.7)
WBC # BLD: 6.5 K/UL (ref 3.5–11.3)
WBC # BLD: ABNORMAL 10*3/UL

## 2019-10-04 PROCEDURE — G8427 DOCREV CUR MEDS BY ELIG CLIN: HCPCS | Performed by: STUDENT IN AN ORGANIZED HEALTH CARE EDUCATION/TRAINING PROGRAM

## 2019-10-04 PROCEDURE — G8417 CALC BMI ABV UP PARAM F/U: HCPCS | Performed by: STUDENT IN AN ORGANIZED HEALTH CARE EDUCATION/TRAINING PROGRAM

## 2019-10-04 PROCEDURE — 99213 OFFICE O/P EST LOW 20 MIN: CPT | Performed by: STUDENT IN AN ORGANIZED HEALTH CARE EDUCATION/TRAINING PROGRAM

## 2019-10-04 PROCEDURE — 1036F TOBACCO NON-USER: CPT | Performed by: STUDENT IN AN ORGANIZED HEALTH CARE EDUCATION/TRAINING PROGRAM

## 2019-10-04 PROCEDURE — G8484 FLU IMMUNIZE NO ADMIN: HCPCS | Performed by: STUDENT IN AN ORGANIZED HEALTH CARE EDUCATION/TRAINING PROGRAM

## 2019-10-04 RX ORDER — ALLOPURINOL 100 MG/1
300 TABLET ORAL DAILY
Qty: 30 TABLET | Refills: 0 | Status: SHIPPED | OUTPATIENT
Start: 2019-10-04 | End: 2019-10-11 | Stop reason: SDUPTHER

## 2019-10-04 RX ORDER — BACITRACIN, NEOMYCIN, POLYMYXIN B 400; 3.5; 5 [USP'U]/G; MG/G; [USP'U]/G
OINTMENT TOPICAL
Qty: 1 TUBE | Refills: 0 | Status: SHIPPED | OUTPATIENT
Start: 2019-10-04 | End: 2019-10-14

## 2019-10-04 ASSESSMENT — ENCOUNTER SYMPTOMS
NAUSEA: 0
ABDOMINAL PAIN: 0
WHEEZING: 0
VOMITING: 0
EYE PAIN: 0
SHORTNESS OF BREATH: 0
COUGH: 0
RHINORRHEA: 0
PHOTOPHOBIA: 0
SORE THROAT: 0

## 2019-10-09 ENCOUNTER — TELEPHONE (OUTPATIENT)
Dept: FAMILY MEDICINE CLINIC | Age: 39
End: 2019-10-09

## 2019-10-09 DIAGNOSIS — M1A.9XX1 GOUT TOPHI: ICD-10-CM

## 2019-10-11 RX ORDER — ALLOPURINOL 100 MG/1
300 TABLET ORAL DAILY
Qty: 90 TABLET | Refills: 2 | Status: SHIPPED | OUTPATIENT
Start: 2019-10-11 | End: 2020-02-06

## 2019-10-18 ENCOUNTER — OFFICE VISIT (OUTPATIENT)
Dept: FAMILY MEDICINE CLINIC | Age: 39
End: 2019-10-18
Payer: MEDICARE

## 2019-10-18 VITALS
DIASTOLIC BLOOD PRESSURE: 90 MMHG | BODY MASS INDEX: 34.79 KG/M2 | HEIGHT: 70 IN | HEART RATE: 104 BPM | SYSTOLIC BLOOD PRESSURE: 135 MMHG | WEIGHT: 243 LBS | TEMPERATURE: 97.3 F

## 2019-10-18 DIAGNOSIS — M1A.9XX1 GOUT TOPHI: Primary | ICD-10-CM

## 2019-10-18 PROCEDURE — 1036F TOBACCO NON-USER: CPT | Performed by: STUDENT IN AN ORGANIZED HEALTH CARE EDUCATION/TRAINING PROGRAM

## 2019-10-18 PROCEDURE — G8427 DOCREV CUR MEDS BY ELIG CLIN: HCPCS | Performed by: STUDENT IN AN ORGANIZED HEALTH CARE EDUCATION/TRAINING PROGRAM

## 2019-10-18 PROCEDURE — 99213 OFFICE O/P EST LOW 20 MIN: CPT | Performed by: STUDENT IN AN ORGANIZED HEALTH CARE EDUCATION/TRAINING PROGRAM

## 2019-10-18 PROCEDURE — G8484 FLU IMMUNIZE NO ADMIN: HCPCS | Performed by: STUDENT IN AN ORGANIZED HEALTH CARE EDUCATION/TRAINING PROGRAM

## 2019-10-18 PROCEDURE — G8417 CALC BMI ABV UP PARAM F/U: HCPCS | Performed by: STUDENT IN AN ORGANIZED HEALTH CARE EDUCATION/TRAINING PROGRAM

## 2019-10-18 RX ORDER — OXYCODONE HYDROCHLORIDE AND ACETAMINOPHEN 5; 325 MG/1; MG/1
1 TABLET ORAL EVERY 6 HOURS PRN
Qty: 20 TABLET | Refills: 0 | Status: SHIPPED | OUTPATIENT
Start: 2019-10-18 | End: 2019-10-23

## 2019-10-18 ASSESSMENT — ENCOUNTER SYMPTOMS
SORE THROAT: 0
SHORTNESS OF BREATH: 0
RHINORRHEA: 0
WHEEZING: 0
VOMITING: 0
PHOTOPHOBIA: 0
COUGH: 0
EYE PAIN: 0
NAUSEA: 0
ABDOMINAL PAIN: 0

## 2019-10-25 ENCOUNTER — TELEPHONE (OUTPATIENT)
Dept: FAMILY MEDICINE CLINIC | Age: 39
End: 2019-10-25

## 2019-10-25 RX ORDER — FAMOTIDINE 20 MG/1
20 TABLET, FILM COATED ORAL 2 TIMES DAILY
Qty: 180 TABLET | Refills: 1 | Status: SHIPPED | OUTPATIENT
Start: 2019-10-25 | End: 2019-12-18 | Stop reason: SDUPTHER

## 2019-11-04 ENCOUNTER — OFFICE VISIT (OUTPATIENT)
Dept: PODIATRY | Age: 39
End: 2019-11-04
Payer: MEDICARE

## 2019-11-04 VITALS
HEART RATE: 82 BPM | HEIGHT: 70 IN | SYSTOLIC BLOOD PRESSURE: 138 MMHG | DIASTOLIC BLOOD PRESSURE: 85 MMHG | BODY MASS INDEX: 34.36 KG/M2 | WEIGHT: 240 LBS

## 2019-11-04 DIAGNOSIS — M21.611 BUNION OF RIGHT FOOT: ICD-10-CM

## 2019-11-04 DIAGNOSIS — M79.675 GREAT TOE PAIN, LEFT: ICD-10-CM

## 2019-11-04 DIAGNOSIS — Z98.890 S/P FOOT SURGERY, LEFT: ICD-10-CM

## 2019-11-04 DIAGNOSIS — M10.072 IDIOPATHIC GOUT OF LEFT FOOT, UNSPECIFIED CHRONICITY: Primary | ICD-10-CM

## 2019-11-04 PROCEDURE — 99212 OFFICE O/P EST SF 10 MIN: CPT | Performed by: STUDENT IN AN ORGANIZED HEALTH CARE EDUCATION/TRAINING PROGRAM

## 2019-11-04 PROCEDURE — 99024 POSTOP FOLLOW-UP VISIT: CPT | Performed by: STUDENT IN AN ORGANIZED HEALTH CARE EDUCATION/TRAINING PROGRAM

## 2019-11-04 PROCEDURE — 29550 STRAPPING OF TOES: CPT | Performed by: STUDENT IN AN ORGANIZED HEALTH CARE EDUCATION/TRAINING PROGRAM

## 2019-11-04 RX ORDER — CELECOXIB 200 MG/1
CAPSULE ORAL
Refills: 0 | COMMUNITY
Start: 2019-10-25 | End: 2022-02-10 | Stop reason: ALTCHOICE

## 2019-11-08 ENCOUNTER — HOSPITAL ENCOUNTER (OUTPATIENT)
Dept: GENERAL RADIOLOGY | Age: 39
Discharge: HOME | End: 2019-11-10
Payer: MEDICARE

## 2019-11-08 ENCOUNTER — HOSPITAL ENCOUNTER (OUTPATIENT)
Age: 39
Discharge: HOME | End: 2019-11-10
Payer: MEDICARE

## 2019-11-08 DIAGNOSIS — Z98.890 S/P FOOT SURGERY, LEFT: ICD-10-CM

## 2019-11-08 DIAGNOSIS — M79.675 GREAT TOE PAIN, LEFT: ICD-10-CM

## 2019-11-08 DIAGNOSIS — M10.072 IDIOPATHIC GOUT OF LEFT FOOT, UNSPECIFIED CHRONICITY: ICD-10-CM

## 2019-11-08 PROCEDURE — 73630 X-RAY EXAM OF FOOT: CPT

## 2019-11-12 ENCOUNTER — HOSPITAL ENCOUNTER (OUTPATIENT)
Age: 39
Setting detail: SPECIMEN
Discharge: HOME | End: 2019-11-12
Payer: MEDICARE

## 2019-11-12 DIAGNOSIS — M1A.9XX1 GOUT TOPHI: ICD-10-CM

## 2019-11-12 LAB — URIC ACID: 5.1 MG/DL (ref 2.4–5.7)

## 2019-11-19 ENCOUNTER — TELEPHONE (OUTPATIENT)
Dept: PHARMACY | Age: 39
End: 2019-11-19

## 2019-11-19 ENCOUNTER — OFFICE VISIT (OUTPATIENT)
Dept: FAMILY MEDICINE CLINIC | Age: 39
End: 2019-11-19
Payer: MEDICARE

## 2019-11-19 VITALS
HEART RATE: 107 BPM | BODY MASS INDEX: 34.29 KG/M2 | WEIGHT: 239 LBS | SYSTOLIC BLOOD PRESSURE: 126 MMHG | DIASTOLIC BLOOD PRESSURE: 87 MMHG

## 2019-11-19 DIAGNOSIS — G89.18 POST-OPERATIVE PAIN: Primary | ICD-10-CM

## 2019-11-19 DIAGNOSIS — M1A.9XX1 GOUT TOPHI: ICD-10-CM

## 2019-11-19 PROCEDURE — 99211 OFF/OP EST MAY X REQ PHY/QHP: CPT | Performed by: FAMILY MEDICINE

## 2019-11-19 PROCEDURE — 99213 OFFICE O/P EST LOW 20 MIN: CPT | Performed by: STUDENT IN AN ORGANIZED HEALTH CARE EDUCATION/TRAINING PROGRAM

## 2019-11-19 RX ORDER — COLCHICINE 0.6 MG/1
0.6 TABLET ORAL DAILY
COMMUNITY
End: 2020-10-29

## 2019-11-19 RX ORDER — OXYCODONE HYDROCHLORIDE AND ACETAMINOPHEN 5; 325 MG/1; MG/1
1 TABLET ORAL EVERY 6 HOURS PRN
Qty: 12 TABLET | Refills: 0 | Status: SHIPPED | OUTPATIENT
Start: 2019-11-19 | End: 2019-11-22

## 2019-11-19 ASSESSMENT — ENCOUNTER SYMPTOMS
SHORTNESS OF BREATH: 0
VOMITING: 0
SORE THROAT: 0
NAUSEA: 0
SINUS PAIN: 0
RHINORRHEA: 1
SINUS PRESSURE: 0
WHEEZING: 0
ABDOMINAL PAIN: 0
DIARRHEA: 0

## 2019-12-02 ENCOUNTER — HOSPITAL ENCOUNTER (OUTPATIENT)
Age: 39
Setting detail: SPECIMEN
Discharge: HOME | End: 2019-12-02
Payer: MEDICARE

## 2019-12-02 LAB
ABSOLUTE EOS #: 0.07 K/UL (ref 0–0.44)
ABSOLUTE IMMATURE GRANULOCYTE: 0.03 K/UL (ref 0–0.3)
ABSOLUTE LYMPH #: 2.29 K/UL (ref 1.1–3.7)
ABSOLUTE MONO #: 0.31 K/UL (ref 0.1–1.2)
ALT SERPL-CCNC: 36 U/L (ref 5–33)
BASOPHILS # BLD: 1 % (ref 0–2)
BASOPHILS ABSOLUTE: 0.03 K/UL (ref 0–0.2)
C-REACTIVE PROTEIN: 4.1 MG/L (ref 0–5)
CREAT SERPL-MCNC: 0.56 MG/DL (ref 0.5–0.9)
DIFFERENTIAL TYPE: ABNORMAL
EOSINOPHILS RELATIVE PERCENT: 1 % (ref 1–4)
GFR AFRICAN AMERICAN: >60 ML/MIN
GFR NON-AFRICAN AMERICAN: >60 ML/MIN
GFR SERPL CREATININE-BSD FRML MDRD: NORMAL ML/MIN/{1.73_M2}
GFR SERPL CREATININE-BSD FRML MDRD: NORMAL ML/MIN/{1.73_M2}
HCT VFR BLD CALC: 39.5 % (ref 36.3–47.1)
HEMOGLOBIN: 12.8 G/DL (ref 11.9–15.1)
IMMATURE GRANULOCYTES: 1 %
LYMPHOCYTES # BLD: 43 % (ref 24–43)
MCH RBC QN AUTO: 33.3 PG (ref 25.2–33.5)
MCHC RBC AUTO-ENTMCNC: 32.4 G/DL (ref 28.4–34.8)
MCV RBC AUTO: 102.9 FL (ref 82.6–102.9)
MONOCYTES # BLD: 6 % (ref 3–12)
NRBC AUTOMATED: 0 PER 100 WBC
PDW BLD-RTO: 14.7 % (ref 11.8–14.4)
PLATELET # BLD: 221 K/UL (ref 138–453)
PLATELET ESTIMATE: ABNORMAL
PMV BLD AUTO: 10.5 FL (ref 8.1–13.5)
RBC # BLD: 3.84 M/UL (ref 3.95–5.11)
RBC # BLD: ABNORMAL 10*6/UL
SEDIMENTATION RATE, ERYTHROCYTE: 12 MM (ref 0–20)
SEG NEUTROPHILS: 48 % (ref 36–65)
SEGMENTED NEUTROPHILS ABSOLUTE COUNT: 2.57 K/UL (ref 1.5–8.1)
URIC ACID: 5.1 MG/DL (ref 2.4–5.7)
WBC # BLD: 5.3 K/UL (ref 3.5–11.3)
WBC # BLD: ABNORMAL 10*3/UL

## 2019-12-09 ENCOUNTER — HOSPITAL ENCOUNTER (EMERGENCY)
Age: 39
Discharge: HOME | End: 2019-12-09
Attending: EMERGENCY MEDICINE
Payer: MEDICARE

## 2019-12-09 ENCOUNTER — APPOINTMENT (OUTPATIENT)
Dept: CT IMAGING | Age: 39
End: 2019-12-09
Payer: MEDICARE

## 2019-12-09 VITALS
TEMPERATURE: 97.6 F | DIASTOLIC BLOOD PRESSURE: 87 MMHG | RESPIRATION RATE: 16 BRPM | HEIGHT: 70 IN | BODY MASS INDEX: 33.07 KG/M2 | HEART RATE: 108 BPM | OXYGEN SATURATION: 98 % | WEIGHT: 231 LBS | SYSTOLIC BLOOD PRESSURE: 135 MMHG

## 2019-12-09 DIAGNOSIS — E83.42 HYPOMAGNESEMIA: ICD-10-CM

## 2019-12-09 DIAGNOSIS — R10.33 PERIUMBILICAL ABDOMINAL PAIN: ICD-10-CM

## 2019-12-09 DIAGNOSIS — R10.30 LOWER ABDOMINAL PAIN: ICD-10-CM

## 2019-12-09 DIAGNOSIS — E87.6 HYPOKALEMIA: Primary | ICD-10-CM

## 2019-12-09 LAB
-: ABNORMAL
ABSOLUTE EOS #: 0.04 K/UL (ref 0–0.44)
ABSOLUTE IMMATURE GRANULOCYTE: 0.06 K/UL (ref 0–0.3)
ABSOLUTE LYMPH #: 2.63 K/UL (ref 1.1–3.7)
ABSOLUTE MONO #: 0.33 K/UL (ref 0.1–1.2)
AMORPHOUS: ABNORMAL
ANION GAP SERPL CALCULATED.3IONS-SCNC: 22 MMOL/L (ref 9–17)
BACTERIA: ABNORMAL
BASOPHILS # BLD: 1 % (ref 0–2)
BASOPHILS ABSOLUTE: 0.06 K/UL (ref 0–0.2)
BILIRUBIN URINE: NEGATIVE
BUN BLDV-MCNC: 21 MG/DL (ref 6–20)
BUN/CREAT BLD: 27 (ref 9–20)
CALCIUM SERPL-MCNC: 9.3 MG/DL (ref 8.6–10.4)
CASTS UA: ABNORMAL /LPF
CHLORIDE BLD-SCNC: 95 MMOL/L (ref 98–107)
CO2: 20 MMOL/L (ref 20–31)
COLOR: YELLOW
COMMENT UA: ABNORMAL
CREAT SERPL-MCNC: 0.78 MG/DL (ref 0.5–0.9)
CRYSTALS, UA: ABNORMAL /HPF
DIFFERENTIAL TYPE: ABNORMAL
EOSINOPHILS RELATIVE PERCENT: 1 % (ref 1–4)
EPITHELIAL CELLS UA: ABNORMAL /HPF (ref 0–5)
GFR AFRICAN AMERICAN: >60 ML/MIN
GFR NON-AFRICAN AMERICAN: >60 ML/MIN
GFR SERPL CREATININE-BSD FRML MDRD: ABNORMAL ML/MIN/{1.73_M2}
GFR SERPL CREATININE-BSD FRML MDRD: ABNORMAL ML/MIN/{1.73_M2}
GLUCOSE BLD-MCNC: 126 MG/DL (ref 70–99)
GLUCOSE URINE: NEGATIVE
HCT VFR BLD CALC: 41.7 % (ref 36.3–47.1)
HEMOGLOBIN: 13.5 G/DL (ref 11.9–15.1)
IMMATURE GRANULOCYTES: 1 %
KETONES, URINE: NEGATIVE
LEUKOCYTE ESTERASE, URINE: ABNORMAL
LYMPHOCYTES # BLD: 31 % (ref 24–43)
MAGNESIUM: 1.3 MG/DL (ref 1.6–2.6)
MCH RBC QN AUTO: 33.1 PG (ref 25.2–33.5)
MCHC RBC AUTO-ENTMCNC: 32.4 G/DL (ref 28.4–34.8)
MCV RBC AUTO: 102.2 FL (ref 82.6–102.9)
MONOCYTES # BLD: 4 % (ref 3–12)
MUCUS: ABNORMAL
NITRITE, URINE: NEGATIVE
NRBC AUTOMATED: 0 PER 100 WBC
OTHER OBSERVATIONS UA: ABNORMAL
PDW BLD-RTO: 14.8 % (ref 11.8–14.4)
PH UA: 6 (ref 5–8)
PLATELET # BLD: 286 K/UL (ref 138–453)
PLATELET ESTIMATE: ABNORMAL
PMV BLD AUTO: 9.6 FL (ref 8.1–13.5)
POTASSIUM SERPL-SCNC: 3.1 MMOL/L (ref 3.7–5.3)
PROTEIN UA: NEGATIVE
RBC # BLD: 4.08 M/UL (ref 3.95–5.11)
RBC # BLD: ABNORMAL 10*6/UL
RBC UA: ABNORMAL /HPF (ref 0–2)
RENAL EPITHELIAL, UA: ABNORMAL /HPF
SEG NEUTROPHILS: 62 % (ref 36–65)
SEGMENTED NEUTROPHILS ABSOLUTE COUNT: 5.27 K/UL (ref 1.5–8.1)
SODIUM BLD-SCNC: 137 MMOL/L (ref 135–144)
SPECIFIC GRAVITY UA: 1.02 (ref 1–1.03)
TRICHOMONAS: ABNORMAL
TURBIDITY: ABNORMAL
URINE HGB: ABNORMAL
UROBILINOGEN, URINE: NORMAL
WBC # BLD: 8.4 K/UL (ref 3.5–11.3)
WBC # BLD: ABNORMAL 10*3/UL
WBC UA: ABNORMAL /HPF (ref 0–5)
YEAST: ABNORMAL

## 2019-12-09 PROCEDURE — 2580000003 HC RX 258: Performed by: PHYSICIAN ASSISTANT

## 2019-12-09 PROCEDURE — 85025 COMPLETE CBC W/AUTO DIFF WBC: CPT

## 2019-12-09 PROCEDURE — 87088 URINE BACTERIA CULTURE: CPT

## 2019-12-09 PROCEDURE — 6370000000 HC RX 637 (ALT 250 FOR IP): Performed by: PHYSICIAN ASSISTANT

## 2019-12-09 PROCEDURE — 83735 ASSAY OF MAGNESIUM: CPT

## 2019-12-09 PROCEDURE — 96361 HYDRATE IV INFUSION ADD-ON: CPT

## 2019-12-09 PROCEDURE — 80048 BASIC METABOLIC PNL TOTAL CA: CPT

## 2019-12-09 PROCEDURE — 87086 URINE CULTURE/COLONY COUNT: CPT

## 2019-12-09 PROCEDURE — 81025 URINE PREGNANCY TEST: CPT

## 2019-12-09 PROCEDURE — 74176 CT ABD & PELVIS W/O CONTRAST: CPT

## 2019-12-09 PROCEDURE — 6360000002 HC RX W HCPCS: Performed by: PHYSICIAN ASSISTANT

## 2019-12-09 PROCEDURE — 96374 THER/PROPH/DIAG INJ IV PUSH: CPT

## 2019-12-09 PROCEDURE — 96375 TX/PRO/DX INJ NEW DRUG ADDON: CPT

## 2019-12-09 PROCEDURE — 81001 URINALYSIS AUTO W/SCOPE: CPT

## 2019-12-09 PROCEDURE — 99284 EMERGENCY DEPT VISIT MOD MDM: CPT

## 2019-12-09 PROCEDURE — 87186 SC STD MICRODIL/AGAR DIL: CPT

## 2019-12-09 RX ORDER — DICYCLOMINE HCL 20 MG
20 TABLET ORAL EVERY 6 HOURS
Qty: 40 TABLET | Refills: 0 | Status: SHIPPED | OUTPATIENT
Start: 2019-12-09 | End: 2020-01-13 | Stop reason: SDUPTHER

## 2019-12-09 RX ORDER — ONDANSETRON 2 MG/ML
4 INJECTION INTRAMUSCULAR; INTRAVENOUS ONCE
Status: COMPLETED | OUTPATIENT
Start: 2019-12-09 | End: 2019-12-09

## 2019-12-09 RX ORDER — MORPHINE SULFATE 4 MG/ML
4 INJECTION, SOLUTION INTRAMUSCULAR; INTRAVENOUS ONCE
Status: COMPLETED | OUTPATIENT
Start: 2019-12-09 | End: 2019-12-09

## 2019-12-09 RX ORDER — NAPROXEN 500 MG/1
500 TABLET ORAL 2 TIMES DAILY WITH MEALS
Qty: 20 TABLET | Refills: 0 | Status: SHIPPED | OUTPATIENT
Start: 2019-12-09 | End: 2020-10-29

## 2019-12-09 RX ORDER — 0.9 % SODIUM CHLORIDE 0.9 %
1000 INTRAVENOUS SOLUTION INTRAVENOUS ONCE
Status: COMPLETED | OUTPATIENT
Start: 2019-12-09 | End: 2019-12-09

## 2019-12-09 RX ORDER — ONDANSETRON 4 MG/1
4 TABLET, ORALLY DISINTEGRATING ORAL EVERY 8 HOURS PRN
Qty: 20 TABLET | Refills: 0 | Status: SHIPPED | OUTPATIENT
Start: 2019-12-09 | End: 2020-10-29

## 2019-12-09 RX ORDER — HYDROCODONE BITARTRATE AND ACETAMINOPHEN 5; 325 MG/1; MG/1
1-2 TABLET ORAL EVERY 8 HOURS PRN
Qty: 12 TABLET | Refills: 0 | Status: SHIPPED | OUTPATIENT
Start: 2019-12-09 | End: 2019-12-12

## 2019-12-09 RX ADMIN — MAGNESIUM GLUCONATE 500 MG ORAL TABLET 800 MG: 500 TABLET ORAL at 17:46

## 2019-12-09 RX ADMIN — SODIUM CHLORIDE 1000 ML: 9 INJECTION, SOLUTION INTRAVENOUS at 16:40

## 2019-12-09 RX ADMIN — ONDANSETRON 4 MG: 2 INJECTION INTRAMUSCULAR; INTRAVENOUS at 16:40

## 2019-12-09 RX ADMIN — POTASSIUM BICARBONATE 40 MEQ: 782 TABLET, EFFERVESCENT ORAL at 17:46

## 2019-12-09 RX ADMIN — MORPHINE SULFATE 4 MG: 4 INJECTION INTRAVENOUS at 16:40

## 2019-12-09 ASSESSMENT — PAIN DESCRIPTION - FREQUENCY: FREQUENCY: CONTINUOUS

## 2019-12-09 ASSESSMENT — PAIN SCALES - GENERAL
PAINLEVEL_OUTOF10: 10
PAINLEVEL_OUTOF10: 10

## 2019-12-09 ASSESSMENT — PAIN DESCRIPTION - DESCRIPTORS: DESCRIPTORS: CONSTANT;SHARP;TENDER

## 2019-12-09 ASSESSMENT — PAIN SCALES - WONG BAKER: WONGBAKER_NUMERICALRESPONSE: 10

## 2019-12-09 ASSESSMENT — PAIN DESCRIPTION - LOCATION: LOCATION: ABDOMEN

## 2019-12-10 LAB — HCG, PREGNANCY URINE (POC): NEGATIVE

## 2019-12-11 LAB
CULTURE: ABNORMAL
Lab: ABNORMAL
SPECIMEN DESCRIPTION: ABNORMAL

## 2019-12-18 ENCOUNTER — OFFICE VISIT (OUTPATIENT)
Dept: FAMILY MEDICINE CLINIC | Age: 39
End: 2019-12-18
Payer: MEDICARE

## 2019-12-18 VITALS
SYSTOLIC BLOOD PRESSURE: 130 MMHG | HEIGHT: 70 IN | BODY MASS INDEX: 33.04 KG/M2 | HEART RATE: 96 BPM | DIASTOLIC BLOOD PRESSURE: 86 MMHG | WEIGHT: 230.8 LBS

## 2019-12-18 DIAGNOSIS — E78.5 HYPERLIPIDEMIA, UNSPECIFIED HYPERLIPIDEMIA TYPE: ICD-10-CM

## 2019-12-18 DIAGNOSIS — I10 ESSENTIAL HYPERTENSION: ICD-10-CM

## 2019-12-18 DIAGNOSIS — M10.9 GOUT INVOLVING TOE OF LEFT FOOT, UNSPECIFIED CAUSE, UNSPECIFIED CHRONICITY: Primary | ICD-10-CM

## 2019-12-18 PROCEDURE — 99211 OFF/OP EST MAY X REQ PHY/QHP: CPT | Performed by: FAMILY MEDICINE

## 2019-12-18 PROCEDURE — 99213 OFFICE O/P EST LOW 20 MIN: CPT | Performed by: STUDENT IN AN ORGANIZED HEALTH CARE EDUCATION/TRAINING PROGRAM

## 2019-12-18 RX ORDER — ATORVASTATIN CALCIUM 40 MG/1
40 TABLET, FILM COATED ORAL DAILY
Qty: 90 TABLET | Refills: 1 | Status: SHIPPED | OUTPATIENT
Start: 2019-12-18 | End: 2020-01-13 | Stop reason: SDUPTHER

## 2019-12-18 RX ORDER — CHLORTHALIDONE 25 MG/1
TABLET ORAL
Qty: 30 TABLET | Refills: 3 | Status: SHIPPED | OUTPATIENT
Start: 2019-12-18 | End: 2020-01-13 | Stop reason: SDUPTHER

## 2019-12-18 RX ORDER — FAMOTIDINE 20 MG/1
20 TABLET, FILM COATED ORAL 2 TIMES DAILY
Qty: 180 TABLET | Refills: 1 | Status: SHIPPED | OUTPATIENT
Start: 2019-12-18 | End: 2020-01-13 | Stop reason: SDUPTHER

## 2019-12-18 ASSESSMENT — ENCOUNTER SYMPTOMS
EYE DISCHARGE: 0
COUGH: 0
SHORTNESS OF BREATH: 0
EYE PAIN: 0
WHEEZING: 0
DIARRHEA: 0
PHOTOPHOBIA: 0
EYE REDNESS: 0
CONSTIPATION: 0
ABDOMINAL PAIN: 0
NAUSEA: 0
BACK PAIN: 0
CHEST TIGHTNESS: 0
BLOOD IN STOOL: 0
SORE THROAT: 0

## 2019-12-22 ENCOUNTER — TELEPHONE (OUTPATIENT)
Dept: FAMILY MEDICINE CLINIC | Age: 39
End: 2019-12-22

## 2019-12-26 DIAGNOSIS — M10.9 ACUTE GOUT INVOLVING TOE OF LEFT FOOT, UNSPECIFIED CAUSE: Primary | ICD-10-CM

## 2019-12-27 DIAGNOSIS — M10.9 ACUTE GOUT INVOLVING TOE OF LEFT FOOT, UNSPECIFIED CAUSE: ICD-10-CM

## 2020-01-04 ENCOUNTER — TELEPHONE (OUTPATIENT)
Dept: FAMILY MEDICINE CLINIC | Age: 40
End: 2020-01-04

## 2020-01-04 NOTE — TELEPHONE ENCOUNTER
Message from [employer]:  [facility] has indicated that it is too soon to refill this medication at the pharmacy for your patient. If you need to renew an existing PA for your patient's medication, please reach out to [facility] directly at [phone].

## 2020-01-13 ENCOUNTER — OFFICE VISIT (OUTPATIENT)
Dept: FAMILY MEDICINE CLINIC | Age: 40
End: 2020-01-13
Payer: MEDICARE

## 2020-01-13 VITALS
WEIGHT: 224.6 LBS | SYSTOLIC BLOOD PRESSURE: 124 MMHG | HEART RATE: 104 BPM | BODY MASS INDEX: 32.16 KG/M2 | DIASTOLIC BLOOD PRESSURE: 83 MMHG | HEIGHT: 70 IN

## 2020-01-13 PROCEDURE — 99213 OFFICE O/P EST LOW 20 MIN: CPT | Performed by: STUDENT IN AN ORGANIZED HEALTH CARE EDUCATION/TRAINING PROGRAM

## 2020-01-13 PROCEDURE — 99211 OFF/OP EST MAY X REQ PHY/QHP: CPT | Performed by: FAMILY MEDICINE

## 2020-01-13 RX ORDER — FAMOTIDINE 20 MG/1
20 TABLET, FILM COATED ORAL 2 TIMES DAILY
Qty: 180 TABLET | Refills: 1 | Status: SHIPPED | OUTPATIENT
Start: 2020-01-13 | End: 2020-02-24 | Stop reason: ALTCHOICE

## 2020-01-13 RX ORDER — DICYCLOMINE HCL 20 MG
20 TABLET ORAL EVERY 6 HOURS
Qty: 40 TABLET | Refills: 1 | Status: SHIPPED | OUTPATIENT
Start: 2020-01-13 | End: 2020-10-29

## 2020-01-13 RX ORDER — ALLOPURINOL 100 MG/1
300 TABLET ORAL DAILY
Qty: 90 TABLET | Refills: 2 | Status: CANCELLED | OUTPATIENT
Start: 2020-01-13

## 2020-01-13 RX ORDER — NAPROXEN 500 MG/1
500 TABLET ORAL 2 TIMES DAILY WITH MEALS
Qty: 20 TABLET | Refills: 0 | Status: CANCELLED | OUTPATIENT
Start: 2020-01-13

## 2020-01-13 RX ORDER — ATORVASTATIN CALCIUM 40 MG/1
40 TABLET, FILM COATED ORAL DAILY
Qty: 90 TABLET | Refills: 1 | Status: SHIPPED | OUTPATIENT
Start: 2020-01-13 | End: 2020-04-02 | Stop reason: SDUPTHER

## 2020-01-13 RX ORDER — CHLORTHALIDONE 25 MG/1
TABLET ORAL
Qty: 30 TABLET | Refills: 3 | Status: SHIPPED | OUTPATIENT
Start: 2020-01-13 | End: 2020-04-02 | Stop reason: SDUPTHER

## 2020-01-13 ASSESSMENT — PATIENT HEALTH QUESTIONNAIRE - PHQ9
SUM OF ALL RESPONSES TO PHQ QUESTIONS 1-9: 0
SUM OF ALL RESPONSES TO PHQ9 QUESTIONS 1 & 2: 0
1. LITTLE INTEREST OR PLEASURE IN DOING THINGS: 0
SUM OF ALL RESPONSES TO PHQ QUESTIONS 1-9: 0
2. FEELING DOWN, DEPRESSED OR HOPELESS: 0

## 2020-01-13 ASSESSMENT — ENCOUNTER SYMPTOMS
NAUSEA: 0
WHEEZING: 0
EYE PAIN: 0
COUGH: 0
SHORTNESS OF BREATH: 0
VOMITING: 0
ABDOMINAL PAIN: 0
RHINORRHEA: 0
SORE THROAT: 0
PHOTOPHOBIA: 0

## 2020-01-13 NOTE — PATIENT INSTRUCTIONS
Thank you for letting us take care of you today. We hope all your questions were addressed. If a question was overlooked or something else comes to mind after you return home, please contact a member of your Care Team listed below. Please make sure you have a routine office visit set up to follow-up on [address]. Your Care Team at [place] [address] is Team #2  Haydee Ruiz DO (Faculty)  Neo Griffith MD (Faculty)  John Bliss MD (Resident)  Rosita Dumont MD (Resident)  Eusebia Paige MD (Resident)  Radha Tsang MD (Resident)  Poly Khan MD (Resident)  Denny Olmedo AKI  Margisela Keyes., [facility]  Kwaku Salazar., [facility] office)  [facility] office)  Dori Rodasquinn ([facility])  Jenifer Gomez, [facility] ([address] [facility])  Dhara Wade, Ph.D., (Behavioral Services)  Vanessa Perez [facility] (Clinical Pharmacist)     Office phone number: [phone]    If you need to get in right away due to illness, please be advised we have \"Same Day\" appointments available Monday-Friday. Please call us at [phone] option #3 to schedule your \"Same Day\" appointment.
0

## 2020-01-13 NOTE — PROGRESS NOTES
Subjective:    Hiram Seen is a 44 y.o. female with  has a past medical history of Anxiety, Arthritis, Class 2 obesity due to excess calories with body mass index (BMI) of 35.0 to 35.9 in adult, Depression, Diabetes mellitus ([facility] [address] H/O tubal ligation, Headache, High cholesterol, and Osteoarthritis. Family History   Problem Relation Age of Onset    Cancer Brother         kidney    High Blood Pressure Maternal Grandmother     Other Maternal Grandmother         aneurysm    High Blood Pressure Maternal Grandfather     High Blood Pressure Paternal Grandmother     High Blood Pressure Paternal Grandfather        Presented tothe office today for:  Chief Complaint   Patient presents with    Gout     follow up     Hypertension     follow up        HPI  Patient is a 44year old female here for follow up of Gout and HTN. HTN  On Chlorthalidone 25 mg Daily  BP well controlled   Pt asymptomatic. Denies any CP, SOB, HA, lightheadedness or vision changes  Compliant with medication regimen  Refills requested  Labs up to date    Gout  Continues on Allopurinol  Corticosteroid injection performed by podiatry which relieved pain for 1 week   Instructed to Elevate foot, wear Right flat surgical shoe to immobilize   R foot bunion offloading pads   Podiatry to operate within 1 month per patient  Does not want to see pain management at this time    Review of Systems   Constitutional: Negative for chills and fever. HENT: Negative for congestion, rhinorrhea and sore throat. Eyes: Negative for photophobia and pain. Respiratory: Negative for cough, shortness of breath and wheezing. Cardiovascular: Negative for chest pain and palpitations. Gastrointestinal: Negative for abdominal pain, nausea and vomiting. Genitourinary: Negative for frequency and urgency. Musculoskeletal: Negative for arthralgias and myalgias.         Right great toe pain, swelling   Neurological: Negative for dizziness, light-headedness and headaches. Objective:    /83 (Site: Left Upper Arm, Position: Sitting, Cuff Size: Large Adult) Comment: machine  Pulse 104   Ht 5' 10\" (1.778 m)   Wt 230 lb 13.2 oz (104.7 kg)   LMP 01/08/2020   BMI 33.12 kg/m²    BP Readings from Last 3 Encounters:   01/13/20 124/83   12/18/19 130/86   12/09/19 135/87       Physical Exam  Constitutional:       General: She is not in acute distress. Appearance: She is well-developed. HENT:      Head: Normocephalic and atraumatic. Neck:      Musculoskeletal: Neck supple. Trachea: No tracheal deviation. Cardiovascular:      Rate and Rhythm: Normal rate and regular rhythm. Heart sounds: No murmur. No friction rub. No gallop. Pulmonary:      Effort: Pulmonary effort is normal.      Breath sounds: Normal breath sounds. No wheezing or rales. Abdominal:      General: There is no distension. Palpations: Abdomen is soft. There is no mass. Tenderness: There is no tenderness. Feet:      Comments: Right great tow swelling. No erythema or warmth. Limited ROM 2/2 pain. Pulses 2+ bilaterally. Tenderness to palpation. Skin:     General: Skin is warm and dry. Findings: No erythema or rash. Neurological:      Mental Status: She is alert and oriented to person, place, and time. Psychiatric:         Behavior: Behavior is cooperative.          Lab Results   Component Value Date    WBC 8.4 12/09/2019    HGB 13.5 12/09/2019    HCT 41.7 12/09/2019     12/09/2019    CHOL 385 (H) 06/18/2019    TRIG 4,229 (H) 06/18/2019    HDL 9 (L) 06/18/2019    LDLDIRECT 11 06/18/2019    ALT 36 (H) 12/02/2019    AST 19 09/15/2017     12/09/2019    K 3.1 (L) 12/09/2019    CL 95 (L) 12/09/2019    CREATININE 0.78 12/09/2019    BUN 21 (H) 12/09/2019    CO2 20 12/09/2019    TSH 3.17 03/26/2019    LABA1C 6.0 09/06/2019    LABMICR 17 06/18/2019     Lab Results   Component Value Date    CALCIUM 9.3 12/09/2019     Lab Results   Component Value Date LDLCHOLESTEROL      06/18/2019    LDLDIRECT 11 06/18/2019       Assessment and Plan:    1. Gout tophi  - Continue Allopurinol  - Follow up with podiatry  - Surgery likely within 1 month   - Declining pain management at this time    2. Essential hypertension  - chlorthalidone (HYGROTON) 25 MG tablet; take 1 tablet by mouth once daily  Dispense: 30 tablet; Refill: 3  - BP well controlled    3. Hyperlipidemia, unspecified hyperlipidemia type  - atorvastatin (LIPITOR) 40 MG tablet; Take 1 tablet by mouth daily  Dispense: 90 tablet; Refill: 1      Requested Prescriptions     Signed Prescriptions Disp Refills    atorvastatin (LIPITOR) 40 MG tablet 90 tablet 1     Sig: Take 1 tablet by mouth daily    chlorthalidone (HYGROTON) 25 MG tablet 30 tablet 3     Sig: take 1 tablet by mouth once daily    famotidine (PEPCID) 20 MG tablet 180 tablet 1     Sig: Take 1 tablet by mouth 2 times daily    dicyclomine (BENTYL) 20 MG tablet 40 tablet 1     Sig: Take 1 tablet by mouth every 6 hours       Medications Discontinued During This Encounter   Medication Reason    atorvastatin (LIPITOR) 40 MG tablet REORDER    chlorthalidone (HYGROTON) 25 MG tablet REORDER    famotidine (PEPCID) 20 MG tablet REORDER    dicyclomine (BENTYL) 20 MG tablet REORDER       Jo received counseling on the following healthy behaviors:nutrition, exercise and medication adherence    Discussed use, benefit, and side effects of prescribed medications. Barriers to medication compliance addressed. All patient questions answered. Pt voicedunderstanding. Return in about 3 months (around 4/13/2020) for gout and HTN.

## 2020-01-13 NOTE — PROGRESS NOTES
I have reviewed and discussed key elements of [address] with the resident including plan of care and follow up and agree with the care reed plan.

## 2020-01-23 ENCOUNTER — HOSPITAL ENCOUNTER (OUTPATIENT)
Age: 40
Setting detail: SPECIMEN
Discharge: HOME | End: 2020-01-23
Payer: MEDICARE

## 2020-01-23 LAB
ABSOLUTE EOS #: 0.03 K/UL (ref 0–0.44)
ABSOLUTE IMMATURE GRANULOCYTE: <0.03 K/UL (ref 0–0.3)
ABSOLUTE LYMPH #: 1.68 K/UL (ref 1.1–3.7)
ABSOLUTE MONO #: 0.24 K/UL (ref 0.1–1.2)
ALT SERPL-CCNC: 43 U/L (ref 5–33)
BASOPHILS # BLD: 1 % (ref 0–2)
BASOPHILS ABSOLUTE: 0.05 K/UL (ref 0–0.2)
CREAT SERPL-MCNC: 0.51 MG/DL (ref 0.5–0.9)
DIFFERENTIAL TYPE: ABNORMAL
EOSINOPHILS RELATIVE PERCENT: 1 % (ref 1–4)
GFR AFRICAN AMERICAN: >60 ML/MIN
GFR NON-AFRICAN AMERICAN: >60 ML/MIN
GFR SERPL CREATININE-BSD FRML MDRD: NORMAL ML/MIN/{1.73_M2}
GFR SERPL CREATININE-BSD FRML MDRD: NORMAL ML/MIN/{1.73_M2}
HCT VFR BLD CALC: 41.9 % (ref 36.3–47.1)
HCT VFR BLD CALC: 42.4 % (ref 36.3–47.1)
HEMOGLOBIN: 13.4 G/DL (ref 11.9–15.1)
HEMOGLOBIN: 13.6 G/DL (ref 11.9–15.1)
IMMATURE GRANULOCYTES: 1 %
LYMPHOCYTES # BLD: 38 % (ref 24–43)
MCH RBC QN AUTO: 33.6 PG (ref 25.2–33.5)
MCH RBC QN AUTO: 34.4 PG (ref 25.2–33.5)
MCHC RBC AUTO-ENTMCNC: 31.6 G/DL (ref 28.4–34.8)
MCHC RBC AUTO-ENTMCNC: 32.5 G/DL (ref 28.4–34.8)
MCV RBC AUTO: 106.1 FL (ref 82.6–102.9)
MCV RBC AUTO: 106.3 FL (ref 82.6–102.9)
MONOCYTES # BLD: 5 % (ref 3–12)
NRBC AUTOMATED: 0 PER 100 WBC
NRBC AUTOMATED: 0 PER 100 WBC
PDW BLD-RTO: 13.9 % (ref 11.8–14.4)
PDW BLD-RTO: 14 % (ref 11.8–14.4)
PLATELET # BLD: 279 K/UL (ref 138–453)
PLATELET # BLD: 289 K/UL (ref 138–453)
PLATELET ESTIMATE: ABNORMAL
PMV BLD AUTO: 11 FL (ref 8.1–13.5)
PMV BLD AUTO: 11.1 FL (ref 8.1–13.5)
RBC # BLD: 3.95 M/UL (ref 3.95–5.11)
RBC # BLD: 3.99 M/UL (ref 3.95–5.11)
RBC # BLD: ABNORMAL 10*6/UL
SEG NEUTROPHILS: 54 % (ref 36–65)
SEGMENTED NEUTROPHILS ABSOLUTE COUNT: 2.39 K/UL (ref 1.5–8.1)
URIC ACID: 5.9 MG/DL (ref 2.4–5.7)
WBC # BLD: 4.4 K/UL (ref 3.5–11.3)
WBC # BLD: 4.6 K/UL (ref 3.5–11.3)
WBC # BLD: ABNORMAL 10*3/UL

## 2020-02-05 NOTE — TELEPHONE ENCOUNTER
Next Visit Date:  No future appointments. Health Maintenance   Topic Date Due    Pneumococcal 0-64 years Vaccine (1 of 1 - PPSV23) 02/23/1986    Diabetic retinal exam  02/23/1990    Flu vaccine (1) 11/19/2020 (Originally 9/1/2019)    Hepatitis B vaccine (1 of 3 - Risk 3-dose series) 12/18/2020 (Originally 2/23/1999)    Diabetic microalbuminuria test  06/18/2020    Lipid screen  06/18/2020    A1C test (Diabetic or Prediabetic)  09/06/2020    Diabetic foot exam  09/27/2020    Potassium monitoring  12/09/2020    Creatinine monitoring  01/23/2021    Cervical cancer screen  04/26/2021    DTaP/Tdap/Td vaccine (2 - Td) 03/23/2027    Shingles Vaccine (1 of 2) 02/23/2030    HIV screen  Completed    Hepatitis A vaccine  Aged Out    Hib vaccine  Aged Out    Meningococcal (ACWY) vaccine  Aged Out    Varicella vaccine  Discontinued       Hemoglobin A1C (%)   Date Value   09/06/2019 6.0   06/18/2019 7.3 (H)   03/26/2019 6.8 (H)             ( goal A1C is < 7)   Microalb/Crt.  Ratio (mcg/mg creat)   Date Value   06/18/2019 17     LDL Cholesterol (mg/dL)   Date Value   06/18/2019        09/15/2017 35       (goal LDL is <100)   AST (U/L)   Date Value   09/15/2017 19     ALT (U/L)   Date Value   01/23/2020 43 (H)     BUN (mg/dL)   Date Value   12/09/2019 21 (H)     BP Readings from Last 3 Encounters:   01/13/20 124/83   12/18/19 130/86   12/09/19 135/87          (goal 120/80)    All Future Testing planned in [facility]  Lab Frequency Next Occurrence   Uric Acid Once 79/54/4211   Basic Metabolic Panel Once 30/73/3759               Patient Active Problem List:     New persistent daily headache     Abnormal CT scan, head     Frequent headaches     Migraine without status migrainosus, not intractable     Class 2 obesity due to excess calories with body mass index (BMI) of 35.0 to 35.9 in adult     Neck muscle spasm     Encounter for smoking cessation counseling     Gout tophi     Essential hypertension

## 2020-02-06 RX ORDER — ALLOPURINOL 100 MG/1
TABLET ORAL
Qty: 90 TABLET | Refills: 2 | Status: SHIPPED | OUTPATIENT
Start: 2020-02-06 | End: 2020-04-02 | Stop reason: SDUPTHER

## 2020-02-11 ENCOUNTER — HOSPITAL ENCOUNTER (OUTPATIENT)
Age: 40
Setting detail: SPECIMEN
Discharge: HOME | End: 2020-02-11
Payer: MEDICARE

## 2020-02-12 LAB
DIRECT EXAM: NORMAL
Lab: NORMAL
SPECIMEN DESCRIPTION: NORMAL

## 2020-02-21 ENCOUNTER — TELEPHONE (OUTPATIENT)
Dept: FAMILY MEDICINE CLINIC | Age: 40
End: 2020-02-21

## 2020-02-24 RX ORDER — CIMETIDINE 400 MG/1
400 TABLET, FILM COATED ORAL 2 TIMES DAILY
Qty: 60 TABLET | Refills: 1 | Status: SHIPPED | OUTPATIENT
Start: 2020-02-24 | End: 2020-04-02 | Stop reason: ALTCHOICE

## 2020-04-02 ENCOUNTER — VIRTUAL VISIT (OUTPATIENT)
Dept: FAMILY MEDICINE CLINIC | Age: 40
End: 2020-04-02
Payer: MEDICARE

## 2020-04-02 PROCEDURE — 99213 OFFICE O/P EST LOW 20 MIN: CPT | Performed by: STUDENT IN AN ORGANIZED HEALTH CARE EDUCATION/TRAINING PROGRAM

## 2020-04-02 RX ORDER — ALLOPURINOL 100 MG/1
TABLET ORAL
Qty: 90 TABLET | Refills: 2 | Status: SHIPPED | OUTPATIENT
Start: 2020-04-02 | End: 2020-08-06

## 2020-04-02 RX ORDER — OMEPRAZOLE 20 MG/1
20 CAPSULE, DELAYED RELEASE ORAL DAILY
Qty: 30 CAPSULE | Refills: 3 | Status: SHIPPED | OUTPATIENT
Start: 2020-04-02 | End: 2020-07-28

## 2020-04-02 RX ORDER — CHLORTHALIDONE 25 MG/1
TABLET ORAL
Qty: 30 TABLET | Refills: 3 | Status: SHIPPED | OUTPATIENT
Start: 2020-04-02 | End: 2020-09-04

## 2020-04-02 RX ORDER — ATORVASTATIN CALCIUM 40 MG/1
40 TABLET, FILM COATED ORAL DAILY
Qty: 90 TABLET | Refills: 1 | Status: SHIPPED | OUTPATIENT
Start: 2020-04-02 | End: 2021-02-18

## 2020-04-02 NOTE — PROGRESS NOTES
Cee House is a 36 y.o. female evaluated via telephone on 4/2/2020. Consent:  She and/or health care decision maker is aware that that she may receive a bill for this telephone service, depending on her insurance coverage, and has provided verbal consent to proceed: Yes      Documentation:  I communicated with the patient and/or health care decision maker about HTN, GERD. Details of this discussion including any medical advice provided:     Patient is a 36year old female for follow up of HTN and GERD. HTN  Pts BP well controlled at her last visit  On Chlorthalidone 25 mg Daily  Compliant with medication regimen  Denies any CP, SOB, HA, lightheadedness or vision changes  Refill needed    GERD  Pt was on Cimetidine, pt stopped taking as it was not helping her. Pt had old Prilosec which she started to take and her symptoms improved. Pt asking to swtich back to Prilosec. PLAN:  1. Essential hypertension  - BP previously well controlled  - chlorthalidone (HYGROTON) 25 MG tablet; take 1 tablet by mouth once daily  Dispense: 30 tablet; Refill: 3    2. Hyperlipidemia, unspecified hyperlipidemia type  - atorvastatin (LIPITOR) 40 MG tablet; Take 1 tablet by mouth daily  Dispense: 90 tablet; Refill: 1    3. GERD  - omeprazole (PRILOSEC) 20 MG delayed release capsule; Take 1 capsule by mouth Daily  Dispense: 30 capsule; Refill: 3    4. Gout tophi  - allopurinol (ZYLOPRIM) 100 MG tablet; take 3 tablets by mouth once daily  Dispense: 90 tablet; Refill: 2      I affirm this is a Patient Initiated Episode with an Established Patient who has not had a related appointment within my department in the past 7 days or scheduled within the next 24 hours.     Total Time: minutes: 11-20 minutes    Note: not billable if this call serves to triage the patient into an appointment for the relevant concern      [address]

## 2020-05-18 ENCOUNTER — HOSPITAL ENCOUNTER (OUTPATIENT)
Age: 40
Setting detail: SPECIMEN
Discharge: HOME | End: 2020-05-18
Payer: MEDICARE

## 2020-05-18 LAB
ABSOLUTE EOS #: <0.03 K/UL (ref 0–0.44)
ABSOLUTE IMMATURE GRANULOCYTE: <0.03 K/UL (ref 0–0.3)
ABSOLUTE LYMPH #: 2.29 K/UL (ref 1.1–3.7)
ABSOLUTE MONO #: 0.39 K/UL (ref 0.1–1.2)
ALT SERPL-CCNC: 50 U/L (ref 5–33)
BASOPHILS # BLD: 0 % (ref 0–2)
BASOPHILS ABSOLUTE: 0.03 K/UL (ref 0–0.2)
CREAT SERPL-MCNC: 0.47 MG/DL (ref 0.5–0.9)
DIFFERENTIAL TYPE: ABNORMAL
EOSINOPHILS RELATIVE PERCENT: 0 % (ref 1–4)
GFR AFRICAN AMERICAN: >60 ML/MIN
GFR NON-AFRICAN AMERICAN: >60 ML/MIN
GFR SERPL CREATININE-BSD FRML MDRD: ABNORMAL ML/MIN/{1.73_M2}
GFR SERPL CREATININE-BSD FRML MDRD: ABNORMAL ML/MIN/{1.73_M2}
HCT VFR BLD CALC: 41.7 % (ref 36.3–47.1)
HEMOGLOBIN: 13.7 G/DL (ref 11.9–15.1)
IMMATURE GRANULOCYTES: 0 %
LYMPHOCYTES # BLD: 32 % (ref 24–43)
MCH RBC QN AUTO: 34.5 PG (ref 25.2–33.5)
MCHC RBC AUTO-ENTMCNC: 32.9 G/DL (ref 28.4–34.8)
MCV RBC AUTO: 105 FL (ref 82.6–102.9)
MONOCYTES # BLD: 5 % (ref 3–12)
NRBC AUTOMATED: 0 PER 100 WBC
PDW BLD-RTO: 13.2 % (ref 11.8–14.4)
PLATELET # BLD: 291 K/UL (ref 138–453)
PLATELET ESTIMATE: ABNORMAL
PMV BLD AUTO: 10.5 FL (ref 8.1–13.5)
RBC # BLD: 3.97 M/UL (ref 3.95–5.11)
RBC # BLD: ABNORMAL 10*6/UL
SEG NEUTROPHILS: 63 % (ref 36–65)
SEGMENTED NEUTROPHILS ABSOLUTE COUNT: 4.53 K/UL (ref 1.5–8.1)
WBC # BLD: 7.3 K/UL (ref 3.5–11.3)
WBC # BLD: ABNORMAL 10*3/UL

## 2020-07-19 ENCOUNTER — APPOINTMENT (OUTPATIENT)
Dept: GENERAL RADIOLOGY | Age: 40
End: 2020-07-19
Payer: MEDICARE

## 2020-07-19 ENCOUNTER — HOSPITAL ENCOUNTER (EMERGENCY)
Age: 40
Discharge: HOME | End: 2020-07-19
Attending: EMERGENCY MEDICINE
Payer: MEDICARE

## 2020-07-19 VITALS
TEMPERATURE: 97.9 F | OXYGEN SATURATION: 100 % | WEIGHT: 206 LBS | HEIGHT: 70 IN | DIASTOLIC BLOOD PRESSURE: 93 MMHG | SYSTOLIC BLOOD PRESSURE: 151 MMHG | RESPIRATION RATE: 18 BRPM | HEART RATE: 100 BPM | BODY MASS INDEX: 29.49 KG/M2

## 2020-07-19 LAB
ABSOLUTE EOS #: 0.04 K/UL (ref 0–0.44)
ABSOLUTE IMMATURE GRANULOCYTE: <0.03 K/UL (ref 0–0.3)
ABSOLUTE LYMPH #: 2.39 K/UL (ref 1.1–3.7)
ABSOLUTE MONO #: 0.46 K/UL (ref 0.1–1.2)
ANION GAP SERPL CALCULATED.3IONS-SCNC: 18 MMOL/L (ref 9–17)
BASOPHILS # BLD: 1 % (ref 0–2)
BASOPHILS ABSOLUTE: 0.05 K/UL (ref 0–0.2)
BUN BLDV-MCNC: 16 MG/DL (ref 6–20)
BUN/CREAT BLD: ABNORMAL (ref 9–20)
C-REACTIVE PROTEIN: 3.1 MG/L (ref 0–5)
CALCIUM SERPL-MCNC: 8.8 MG/DL (ref 8.6–10.4)
CHLORIDE BLD-SCNC: 97 MMOL/L (ref 98–107)
CO2: 22 MMOL/L (ref 20–31)
CREAT SERPL-MCNC: 0.66 MG/DL (ref 0.5–0.9)
DIFFERENTIAL TYPE: ABNORMAL
EOSINOPHILS RELATIVE PERCENT: 1 % (ref 1–4)
GFR AFRICAN AMERICAN: >60 ML/MIN
GFR NON-AFRICAN AMERICAN: >60 ML/MIN
GFR SERPL CREATININE-BSD FRML MDRD: ABNORMAL ML/MIN/{1.73_M2}
GFR SERPL CREATININE-BSD FRML MDRD: ABNORMAL ML/MIN/{1.73_M2}
GLUCOSE BLD-MCNC: 105 MG/DL (ref 70–99)
HCT VFR BLD CALC: 35.4 % (ref 36.3–47.1)
HEMOGLOBIN: 12.2 G/DL (ref 11.9–15.1)
IMMATURE GRANULOCYTES: 0 %
LYMPHOCYTES # BLD: 30 % (ref 24–43)
MCH RBC QN AUTO: 34.8 PG (ref 25.2–33.5)
MCHC RBC AUTO-ENTMCNC: 34.5 G/DL (ref 28.4–34.8)
MCV RBC AUTO: 100.9 FL (ref 82.6–102.9)
MONOCYTES # BLD: 6 % (ref 3–12)
NRBC AUTOMATED: 0 PER 100 WBC
PDW BLD-RTO: 13.5 % (ref 11.8–14.4)
PLATELET # BLD: 222 K/UL (ref 138–453)
PLATELET ESTIMATE: ABNORMAL
PMV BLD AUTO: 9.5 FL (ref 8.1–13.5)
POTASSIUM SERPL-SCNC: 3.2 MMOL/L (ref 3.7–5.3)
RBC # BLD: 3.51 M/UL (ref 3.95–5.11)
RBC # BLD: ABNORMAL 10*6/UL
SEDIMENTATION RATE, ERYTHROCYTE: 34 MM (ref 0–20)
SEG NEUTROPHILS: 62 % (ref 36–65)
SEGMENTED NEUTROPHILS ABSOLUTE COUNT: 5.12 K/UL (ref 1.5–8.1)
SODIUM BLD-SCNC: 137 MMOL/L (ref 135–144)
WBC # BLD: 8.1 K/UL (ref 3.5–11.3)
WBC # BLD: ABNORMAL 10*3/UL

## 2020-07-19 PROCEDURE — 85652 RBC SED RATE AUTOMATED: CPT

## 2020-07-19 PROCEDURE — 85025 COMPLETE CBC W/AUTO DIFF WBC: CPT

## 2020-07-19 PROCEDURE — 99283 EMERGENCY DEPT VISIT LOW MDM: CPT

## 2020-07-19 PROCEDURE — 6360000002 HC RX W HCPCS: Performed by: GENERAL PRACTICE

## 2020-07-19 PROCEDURE — 73630 X-RAY EXAM OF FOOT: CPT

## 2020-07-19 PROCEDURE — 86140 C-REACTIVE PROTEIN: CPT

## 2020-07-19 PROCEDURE — 80048 BASIC METABOLIC PNL TOTAL CA: CPT

## 2020-07-19 PROCEDURE — 96374 THER/PROPH/DIAG INJ IV PUSH: CPT

## 2020-07-19 RX ORDER — PREDNISONE 10 MG/1
TABLET ORAL
Qty: 20 TABLET | Refills: 0 | Status: SHIPPED | OUTPATIENT
Start: 2020-07-19 | End: 2020-07-29

## 2020-07-19 RX ORDER — IBUPROFEN 800 MG/1
800 TABLET ORAL EVERY 6 HOURS PRN
Qty: 21 TABLET | Refills: 0 | Status: SHIPPED | OUTPATIENT
Start: 2020-07-19 | End: 2020-10-29

## 2020-07-19 RX ORDER — KETOROLAC TROMETHAMINE 15 MG/ML
15 INJECTION, SOLUTION INTRAMUSCULAR; INTRAVENOUS ONCE
Status: COMPLETED | OUTPATIENT
Start: 2020-07-19 | End: 2020-07-19

## 2020-07-19 RX ORDER — KETOROLAC TROMETHAMINE 30 MG/ML
30 INJECTION, SOLUTION INTRAMUSCULAR; INTRAVENOUS ONCE
Status: DISCONTINUED | OUTPATIENT
Start: 2020-07-19 | End: 2020-07-19

## 2020-07-19 RX ADMIN — KETOROLAC TROMETHAMINE 15 MG: 15 INJECTION, SOLUTION INTRAMUSCULAR; INTRAVENOUS at 20:16

## 2020-07-19 ASSESSMENT — PAIN DESCRIPTION - PAIN TYPE: TYPE: CHRONIC PAIN

## 2020-07-19 ASSESSMENT — PAIN SCALES - GENERAL
PAINLEVEL_OUTOF10: 10
PAINLEVEL_OUTOF10: 10

## 2020-07-19 ASSESSMENT — PAIN DESCRIPTION - LOCATION: LOCATION: FOOT

## 2020-07-19 ASSESSMENT — PAIN DESCRIPTION - ORIENTATION: ORIENTATION: LEFT

## 2020-07-19 ASSESSMENT — PAIN DESCRIPTION - DESCRIPTORS: DESCRIPTORS: BURNING;TINGLING

## 2020-07-19 NOTE — ED NOTES
Pt came to ED with c/o increased left foot pain. Pt stated she has had 2 surgeries on foot with most recent being in February. Pt stated she does take medication for gout. Pt was told she doesn't have cartilage between some of the bones on her feet. Pt is wearing walking boot on left foot. Pt reports she is unable to bear weight on foot. Pt stated she took tylenol for pain this AM without any relief.       Yokasta Maria, MEGAN  07/19/20 [id number]

## 2020-07-20 NOTE — PROGRESS NOTES
This patient was assigned to me by error. This patient was never interviewed nor examined by me. I did not participate in the care of this patient.     Jennie Chavez, PGY-3

## 2020-07-20 NOTE — ED PROVIDER NOTES
[facility]     Emergency Department     Faculty Attestation    I performed a history and physical examination of the patient and discussed management with the resident. I reviewed the residents note and agree with the documented findings and plan of care. Any areas of disagreement are noted on the chart. I was personally present for the key portions of any procedures. I have documented in the chart those procedures where I was not present during the key portions. I have reviewed the emergency nurses triage note. I agree with the chief complaint, past medical history, past surgical history, allergies, medications, social and family history as documented unless otherwise noted below. For Physician Assistant/ Nurse Practitioner cases/documentation I have personally evaluated this patient and have completed at least one if not all key elements of the E/M (history, physical exam, and MDM). Additional findings are as noted. I have personally seen and evaluated the patient. I find the patient's history and physical exam are consistent with the NP/PA documentation. I agree with the care provided, treatment rendered, disposition and follow-up plan. Patient has swelling and discomfort of her left great toe history of gout but has also had surgery including a spacer approximately 4 to 5 months ago of the same toe besides erythema and tenderness of the toe there is no other complaints seems most consistent with gout cannot exclude postsurgical infection patient will be followed by podiatry      Afua Richard M.D.   Attending Emergency  Physician              Jenny Lynn MD  07/19/20 2019

## 2020-07-22 ASSESSMENT — ENCOUNTER SYMPTOMS
SHORTNESS OF BREATH: 0
EYES NEGATIVE: 1
COUGH: 0
COLOR CHANGE: 1
GASTROINTESTINAL NEGATIVE: 1

## 2020-07-28 RX ORDER — OMEPRAZOLE 20 MG/1
CAPSULE, DELAYED RELEASE ORAL
Qty: 30 CAPSULE | Refills: 3 | Status: SHIPPED | OUTPATIENT
Start: 2020-07-28 | End: 2020-11-24

## 2020-08-05 NOTE — TELEPHONE ENCOUNTER
Next Visit Date:  No future appointments. Health Maintenance   Topic Date Due    Pneumococcal 0-64 years Vaccine (1 of 1 - PPSV23) 02/23/1986    Diabetic retinal exam  02/23/1990    Diabetic microalbuminuria test  06/18/2020    Lipid screen  06/18/2020    Hepatitis B vaccine (1 of 3 - Risk 3-dose series) 12/18/2020 (Originally 2/23/1999)    Flu vaccine (1) 09/01/2020    A1C test (Diabetic or Prediabetic)  09/06/2020    Diabetic foot exam  09/27/2020    Cervical cancer screen  04/26/2021    Potassium monitoring  07/19/2021    Creatinine monitoring  07/19/2021    DTaP/Tdap/Td vaccine (2 - Td) 03/23/2027    HIV screen  Completed    Hepatitis A vaccine  Aged Out    Hib vaccine  Aged Out    Meningococcal (ACWY) vaccine  Aged Out    Varicella vaccine  Discontinued       Hemoglobin A1C (%)   Date Value   09/06/2019 6.0   06/18/2019 7.3 (H)   03/26/2019 6.8 (H)             ( goal A1C is < 7)   Microalb/Crt.  Ratio (mcg/mg creat)   Date Value   06/18/2019 17     LDL Cholesterol (mg/dL)   Date Value   06/18/2019        09/15/2017 35       (goal LDL is <100)   AST (U/L)   Date Value   09/15/2017 19     ALT (U/L)   Date Value   05/18/2020 50 (H)     BUN (mg/dL)   Date Value   07/19/2020 16     BP Readings from Last 3 Encounters:   07/19/20 (!) 151/93   01/13/20 124/83   12/18/19 130/86          (goal 120/80)    All Future Testing planned in [facility]  Lab Frequency Next Occurrence   Uric Acid Once 36/11/8719   Basic Metabolic Panel Once 76/74/6716               Patient Active Problem List:     New persistent daily headache     Abnormal CT scan, head     Frequent headaches     Migraine without status migrainosus, not intractable     Class 2 obesity due to excess calories with body mass index (BMI) of 35.0 to 35.9 in adult     Neck muscle spasm     Encounter for smoking cessation counseling     Gout tophi     Essential hypertension

## 2020-08-06 RX ORDER — ALLOPURINOL 100 MG/1
TABLET ORAL
Qty: 90 TABLET | Refills: 2 | Status: SHIPPED | OUTPATIENT
Start: 2020-08-06 | End: 2020-10-27

## 2020-08-23 ENCOUNTER — HOSPITAL ENCOUNTER (EMERGENCY)
Age: 40
Discharge: HOME | End: 2020-08-23
Attending: EMERGENCY MEDICINE
Payer: MEDICARE

## 2020-08-23 VITALS
DIASTOLIC BLOOD PRESSURE: 102 MMHG | SYSTOLIC BLOOD PRESSURE: 145 MMHG | OXYGEN SATURATION: 98 % | TEMPERATURE: 98.7 F | RESPIRATION RATE: 18 BRPM | HEART RATE: 103 BPM

## 2020-08-23 PROCEDURE — 99282 EMERGENCY DEPT VISIT SF MDM: CPT

## 2020-08-23 ASSESSMENT — PAIN SCALES - GENERAL: PAINLEVEL_OUTOF10: 5

## 2020-08-23 ASSESSMENT — ENCOUNTER SYMPTOMS
ABDOMINAL PAIN: 0
SHORTNESS OF BREATH: 0
EYE REDNESS: 0
EYE DISCHARGE: 0
CHEST TIGHTNESS: 0

## 2020-08-23 ASSESSMENT — PAIN DESCRIPTION - DESCRIPTORS: DESCRIPTORS: CONSTANT

## 2020-08-23 ASSESSMENT — PAIN DESCRIPTION - FREQUENCY: FREQUENCY: CONTINUOUS

## 2020-08-23 ASSESSMENT — PAIN DESCRIPTION - PROGRESSION: CLINICAL_PROGRESSION: NOT CHANGED

## 2020-08-23 ASSESSMENT — PAIN DESCRIPTION - PAIN TYPE: TYPE: ACUTE PAIN

## 2020-08-23 ASSESSMENT — PAIN DESCRIPTION - LOCATION: LOCATION: FINGER (COMMENT WHICH ONE)

## 2020-08-23 ASSESSMENT — PAIN DESCRIPTION - ONSET: ONSET: ON-GOING

## 2020-08-23 ASSESSMENT — PAIN DESCRIPTION - ORIENTATION: ORIENTATION: RIGHT

## 2020-08-23 ASSESSMENT — PAIN - FUNCTIONAL ASSESSMENT: PAIN_FUNCTIONAL_ASSESSMENT: ACTIVITIES ARE NOT PREVENTED

## 2020-08-24 NOTE — ED PROVIDER NOTES
[facility]     Emergency Department     Faculty Attestation    I performed a history and physical examination of the patient and discussed management with the resident. I have reviewed and agree with the residents findings including all diagnostic interpretations, and treatment plans as written at the time of my review. Any areas of disagreement are noted on the chart. I was personally present for the key portions of any procedures. I have documented in the chart those procedures where I was not present during the key portions. For Physician Assistant/ Nurse Practitioner cases/documentation I have personally evaluated this patient and have completed at least one if not all key elements of the E/M (history, physical exam, and MDM). Additional findings are as noted. This patient was evaluated in the Emergency Department for symptoms described in the history of present illness. The patient was evaluated in the context of the global COVID-19 pandemic, which necessitated consideration that the patient might be at risk for infection with the SARS-CoV-2 virus that causes COVID-19. Institutional protocols and algorithms that pertain to the evaluation of patients at risk for COVID-19 are in a state of rapid change based on information released by regulatory bodies including the CDC and federal and state organizations. These policies and algorithms were followed during the patient's care in the ED. Primary Care Physician: Keyshawn Ramesh MD    History: This is a 36 y.o. female who presents to the Emergency Department with complaint of laceration. The patient cut the index finger tip with a knife. Her tetanus immunization is up-to-date. Patient states she was unable to get the bleeding controlled. Physical:   oral temperature is 98.7 °F (37.1 °C). Her blood pressure is 145/102 (abnormal) and her pulse is 103.  Her respiration is 18 and oxygen saturation is 98%.  There is avulsion of the distal tip of the index finger. There is oozing of blood. There is no nail involvement. There is no tissue to suture together. Impression: Avulsion tissue, laceration    Plan: Control bleeding      (Please note that portions of this note were completed with a voice recognition program.  Efforts were made to edit the dictations but occasionally words are mis-transcribed.)    Dale Altman.  Simon Aguilar MD, [facility]  Attending Emergency Medicine Physician        Kristina Moore MD  08/23/20 [id number]

## 2020-08-24 NOTE — ED NOTES
Pt states she was slicing tomatoes on a mandolin and sliced the tip of right forefinger off. Pt states she tried to get the bleeding to stop at home but was not able. Holding pressure with gauze has the bleeding undercontrol. Pt is alert and oriented. NAD at this time. Even non labored RR. Will continue to monitor.         Crystal Prince RN  08/23/20 [id number]

## 2020-08-24 NOTE — ED PROVIDER NOTES
[facility]  ED  Emergency Department Encounter  Emergency Medicine Resident     Pt Name: Moe Lin  MRN: [id number]  Franckgfcarmen 1980  Date of evaluation: 20  PCP:  Vi Lin MD    [address] [place] [address]       Chief Complaint   Patient presents with    Laceration       HISTORY OFPRESENT ILLNESS  (Location/Symptom, Timing/Onset, Context/Setting, Quality, Duration, Modifying Factors,Severity.)      Moe Lin is a 36 y.o. female who presents with finger laceration while cutting food. Patient states the knife slipped and hit her tip of her left index finger. She attempted to hold pressure and hope the bleeding would stop however the bleeding has continued for approximately 2 hours. Is not any anticoagulants or antiplatelets. Denies any nailbed injury. PAST MEDICAL / SURGICAL / SOCIAL / FAMILY HISTORY      has a past medical history of Anxiety, Arthritis, Class 2 obesity due to excess calories with body mass index (BMI) of 35.0 to 35.9 in adult, Depression, Diabetes mellitus ([facility] [address] H/O tubal ligation, Headache, High cholesterol, and Osteoarthritis. has a past surgical history that includes fracture surgery; Arm Surgery (Left); Tubal ligation (); [place] tooth extraction (Bilateral); Toe Surgery (Left, 08/15/2019); and Foot neuroma surgery (Left, 8/15/2019).     Social History     Socioeconomic History    Marital status:      Spouse name: Not on file    Number of children: Not on file    Years of education: Not on file    Highest education level: Not on file   Occupational History    Not on file   Social Needs    Financial resource strain: Not on file    Food insecurity     Worry: Not on file     Inability: Not on file    Transportation needs     Medical: Not on file     Non-medical: Not on file   Tobacco Use    Smoking status: Former Smoker     Last attempt to quit: 10/2018     Years since quittin.8    Smokeless tobacco: Never Used   Substance MD Vinny   diclofenac sodium 1 % GEL Apply 4 g topically 4 times daily as needed for Pain Apply to affected areas 12/31/19   Jose Castle MD   diclofenac sodium 1 % GEL Apply 4 g topically 4 times daily 12/18/19   Jose Castle MD   ondansetron (ZOFRAN ODT) 4 MG disintegrating tablet Take 1 tablet by mouth every 8 hours as needed for Nausea 12/9/19   Roselinda Bence, PA-C   naproxen (NAPROSYN) 500 MG tablet Take 1 tablet by mouth 2 times daily (with meals) 12/9/19   Roselinda Bence, PA-C   colchicine (COLCRYS) 0.6 MG tablet Take 0.6 mg by mouth daily    Historical Provider, MD   celecoxib (CELEBREX) 200 MG capsule  10/25/19   Historical Provider, MD       REVIEW OF SYSTEMS    (2-9 systems for level 4, 10 or more for level 5)      Review of Systems   Constitutional: Negative for chills and fever. Eyes: Negative for discharge and redness. Respiratory: Negative for chest tightness and shortness of breath. Cardiovascular: Negative for chest pain. Gastrointestinal: Negative for abdominal pain. Genitourinary: Negative for dysuria and flank pain. Musculoskeletal: Negative for myalgias. Skin: Positive for wound. Negative for rash. Allergic/Immunologic: Positive for environmental allergies. Neurological: Negative for headaches. Psychiatric/Behavioral: Negative for agitation and confusion. PHYSICAL EXAM   (up to 7 for level 4, 8 or more for level 5)     INITIAL VITALS:    oral temperature is 98.7 °F (37.1 °C). Her blood pressure is 145/102 (abnormal) and her pulse is 103. Her respiration is 18 and oxygen saturation is 98%. Physical Exam  Vitals signs and nursing note reviewed. Constitutional:       Appearance: She is well-developed. HENT:      Head: Normocephalic and atraumatic. Nose: Nose normal.      Mouth/Throat:      Mouth: Mucous membranes are moist.   Eyes:      General: No scleral icterus.      Conjunctiva/sclera: Conjunctivae normal.      Pupils: Pupils Statement Selected

## 2020-08-26 ENCOUNTER — HOSPITAL ENCOUNTER (OUTPATIENT)
Age: 40
Setting detail: SPECIMEN
Discharge: HOME | End: 2020-08-26
Payer: MEDICARE

## 2020-08-26 LAB
ABSOLUTE EOS #: 0.06 K/UL (ref 0–0.44)
ABSOLUTE IMMATURE GRANULOCYTE: <0.03 K/UL (ref 0–0.3)
ABSOLUTE LYMPH #: 1.8 K/UL (ref 1.1–3.7)
ABSOLUTE MONO #: 0.29 K/UL (ref 0.1–1.2)
ALT SERPL-CCNC: 38 U/L (ref 5–33)
BASOPHILS # BLD: 1 % (ref 0–2)
BASOPHILS ABSOLUTE: 0.04 K/UL (ref 0–0.2)
CREAT SERPL-MCNC: 0.52 MG/DL (ref 0.5–0.9)
DIFFERENTIAL TYPE: ABNORMAL
EOSINOPHILS RELATIVE PERCENT: 1 % (ref 1–4)
GFR AFRICAN AMERICAN: >60 ML/MIN
GFR NON-AFRICAN AMERICAN: >60 ML/MIN
GFR SERPL CREATININE-BSD FRML MDRD: NORMAL ML/MIN/{1.73_M2}
GFR SERPL CREATININE-BSD FRML MDRD: NORMAL ML/MIN/{1.73_M2}
HCT VFR BLD CALC: 39 % (ref 36.3–47.1)
HEMOGLOBIN: 12.8 G/DL (ref 11.9–15.1)
IMMATURE GRANULOCYTES: 0 %
LYMPHOCYTES # BLD: 37 % (ref 24–43)
MCH RBC QN AUTO: 34.1 PG (ref 25.2–33.5)
MCHC RBC AUTO-ENTMCNC: 32.8 G/DL (ref 28.4–34.8)
MCV RBC AUTO: 104 FL (ref 82.6–102.9)
MONOCYTES # BLD: 6 % (ref 3–12)
NRBC AUTOMATED: 0 PER 100 WBC
PDW BLD-RTO: 13.1 % (ref 11.8–14.4)
PLATELET # BLD: 260 K/UL (ref 138–453)
PLATELET ESTIMATE: ABNORMAL
PMV BLD AUTO: 10.7 FL (ref 8.1–13.5)
RBC # BLD: 3.75 M/UL (ref 3.95–5.11)
RBC # BLD: ABNORMAL 10*6/UL
SEG NEUTROPHILS: 55 % (ref 36–65)
SEGMENTED NEUTROPHILS ABSOLUTE COUNT: 2.68 K/UL (ref 1.5–8.1)
URIC ACID: 4.8 MG/DL (ref 2.4–5.7)
WBC # BLD: 4.9 K/UL (ref 3.5–11.3)
WBC # BLD: ABNORMAL 10*3/UL

## 2020-09-04 RX ORDER — CHLORTHALIDONE 25 MG/1
TABLET ORAL
Qty: 30 TABLET | Refills: 3 | Status: SHIPPED | OUTPATIENT
Start: 2020-09-04 | End: 2021-03-25 | Stop reason: ALTCHOICE

## 2020-09-04 NOTE — TELEPHONE ENCOUNTER
excess calories with body mass index (BMI) of 35.0 to 35.9 in adult     Neck muscle spasm     Encounter for smoking cessation counseling     Gout tophi     Essential hypertension

## 2020-10-07 ENCOUNTER — OFFICE VISIT (OUTPATIENT)
Dept: FAMILY MEDICINE CLINIC | Age: 40
End: 2020-10-07
Payer: MEDICARE

## 2020-10-07 VITALS
BODY MASS INDEX: 28.92 KG/M2 | WEIGHT: 202 LBS | HEIGHT: 70 IN | TEMPERATURE: 97.7 F | HEART RATE: 92 BPM | SYSTOLIC BLOOD PRESSURE: 133 MMHG | DIASTOLIC BLOOD PRESSURE: 88 MMHG

## 2020-10-07 LAB — HBA1C MFR BLD: 5.5 %

## 2020-10-07 PROCEDURE — 83036 HEMOGLOBIN GLYCOSYLATED A1C: CPT | Performed by: STUDENT IN AN ORGANIZED HEALTH CARE EDUCATION/TRAINING PROGRAM

## 2020-10-07 PROCEDURE — 99213 OFFICE O/P EST LOW 20 MIN: CPT | Performed by: STUDENT IN AN ORGANIZED HEALTH CARE EDUCATION/TRAINING PROGRAM

## 2020-10-07 RX ORDER — MEDICAL SUPPLY, MISCELLANEOUS
1 EACH MISCELLANEOUS ONCE
Qty: 1 EACH | Refills: 0 | Status: SHIPPED | OUTPATIENT
Start: 2020-10-07 | End: 2021-03-25

## 2020-10-07 ASSESSMENT — PATIENT HEALTH QUESTIONNAIRE - PHQ9
SUM OF ALL RESPONSES TO PHQ QUESTIONS 1-9: 0
SUM OF ALL RESPONSES TO PHQ QUESTIONS 1-9: 0
2. FEELING DOWN, DEPRESSED OR HOPELESS: 0

## 2020-10-07 ASSESSMENT — ENCOUNTER SYMPTOMS
SHORTNESS OF BREATH: 0
VOMITING: 0
ABDOMINAL PAIN: 0
WHEEZING: 0
PHOTOPHOBIA: 0
NAUSEA: 0
COUGH: 0
EYE PAIN: 0
SORE THROAT: 0
RHINORRHEA: 0

## 2020-10-07 NOTE — PROGRESS NOTES
Subjective:    Maynor Arguelles is a 36 y.o. female with  has a past medical history of Anxiety, Arthritis, Class 2 obesity due to excess calories with body mass index (BMI) of 35.0 to 35.9 in adult, Depression, Diabetes mellitus ([address] H/O tubal ligation, Headache, High cholesterol, and Osteoarthritis. Family History   Problem Relation Age of Onset    Cancer Brother         kidney    High Blood Pressure Maternal Grandmother     Other Maternal Grandmother         aneurysm    High Blood Pressure Maternal Grandfather     High Blood Pressure Paternal Grandmother     High Blood Pressure Paternal Grandfather        Presented tothe office today for:  Chief Complaint   Patient presents with    Other     a1c       HPI     Patient is a 36year old female here for follow up of HTN and DM    HTN- well controlled on chlorthalidone 25 mg Daily  Pt has lost 44 pounds since 9/2019  Wondering if we can take her off BP medications  Denies any CP, SOB, HA, lightheadedness or vision changes  BP cuff prescribed for home use    DM- 9/2019 was 6.0  A1C today is 5.5  Pt is not taking any medications  Continues with diet and exercise    Review of Systems   Constitutional: Negative for chills and fever. HENT: Negative for congestion, rhinorrhea and sore throat. Eyes: Negative for photophobia and pain. Respiratory: Negative for cough, shortness of breath and wheezing. Cardiovascular: Negative for chest pain and palpitations. Gastrointestinal: Negative for abdominal pain, nausea and vomiting. Genitourinary: Negative for frequency and urgency. Musculoskeletal: Negative for arthralgias and myalgias. Neurological: Negative for dizziness, light-headedness and headaches.        Objective:    /88 (Site: Right Upper Arm, Position: Sitting, Cuff Size: Medium Adult)   Pulse 92   Temp 97.7 °F (36.5 °C) (Temporal)   Ht 5' 10\" (1.778 m)   Wt 202 lb (91.6 kg)   BMI 28.98 kg/m²    BP Readings from Last 3 Encounters: 10/07/20 133/88   08/23/20 (!) 145/102   07/19/20 (!) 151/93       Physical Exam  Constitutional:       General: She is not in acute distress. Appearance: She is well-developed. HENT:      Head: Normocephalic and atraumatic. Neck:      Musculoskeletal: Neck supple. Trachea: No tracheal deviation. Cardiovascular:      Rate and Rhythm: Normal rate and regular rhythm. Heart sounds: No murmur. No friction rub. No gallop. Pulmonary:      Effort: Pulmonary effort is normal.      Breath sounds: Normal breath sounds. No wheezing or rales. Skin:     General: Skin is warm and dry. Findings: No erythema or rash. Neurological:      Mental Status: She is alert and oriented to person, place, and time. Psychiatric:         Behavior: Behavior is cooperative. Visual inspection:  Deformity/amputation: absent  Skin lesions/pre-ulcerative calluses: absent  Edema: right- negative, left- negative    Sensory exam:  Monofilament sensation: normal  (minimum of 5 random plantar locations tested, avoiding callused areas - > 1 area with absence of sensation is + for neuropathy)    Plus at least one of the following:  Pulses: normal,   Pinprick: Intact  Proprioception: Intact  Vibration (128 Hz): N/A      Lab Results   Component Value Date    WBC 4.9 08/26/2020    HGB 12.8 08/26/2020    HCT 39.0 08/26/2020     08/26/2020    CHOL 385 (H) 06/18/2019    TRIG 4,229 (H) 06/18/2019    HDL 9 (L) 06/18/2019    LDLDIRECT 11 06/18/2019    ALT 38 (H) 08/26/2020    AST 19 09/15/2017     07/19/2020    K 3.2 (L) 07/19/2020    CL 97 (L) 07/19/2020    CREATININE 0.52 08/26/2020    BUN 16 07/19/2020    CO2 22 07/19/2020    TSH 3.17 03/26/2019    LABA1C 6.0 09/06/2019    LABMICR 17 06/18/2019     Lab Results   Component Value Date    CALCIUM 8.8 07/19/2020     Lab Results   Component Value Date    LDLCHOLESTEROL      06/18/2019    LDLDIRECT 11 06/18/2019       Assessment and Plan:    1.  Essential hypertension  - Continue current medication   - BP cuff at home  - Pt to keep log and bring in at next visit  - Will review and decide if she can be taken off or switched to another medication  - Blood Pressure Monitoring (B-D ASSURE BPM/AUTO ARM CUFF) MISC; 1 Device by Does not apply route once for 1 dose  Dispense: 1 each; Refill: 0    2. Type 2 diabetes mellitus without complication, without long-term current use of insulin (HCC)  - A1C: 5.5 today  -  DIABETES FOOT EXAM  - POCT glycosylated hemoglobin (Hb A1C)  - Microalbumin, Ur; Future    3. Screening for hyperlipidemia  - Lipid Panel; Future      Requested Prescriptions     Signed Prescriptions Disp Refills    Blood Pressure Monitoring (B-D ASSURE BPM/AUTO ARM CUFF) MISC 1 each 0     Si Device by Does not apply route once for 1 dose       There are no discontinued medications. Hoa Fltulio received counseling on the following healthy behaviors:nutrition, exercise and medication adherence    Discussed use, benefit, and side effects of prescribed medications. Barriers to medication compliance addressed. All patient questions answered. Pt voicedunderstanding. Return in about 2 weeks (around 10/21/2020) for HTN.

## 2020-10-20 ENCOUNTER — HOSPITAL ENCOUNTER (OUTPATIENT)
Age: 40
Setting detail: SPECIMEN
Discharge: HOME | End: 2020-10-20
Payer: MEDICARE

## 2020-10-20 LAB
CHOLESTEROL/HDL RATIO: 6
CHOLESTEROL: 229 MG/DL
CREATININE URINE: 83.1 MG/DL (ref 28–217)
HDLC SERPL-MCNC: 38 MG/DL
LDL CHOLESTEROL DIRECT: 31 MG/DL
LDL CHOLESTEROL: ABNORMAL MG/DL (ref 0–130)
MICROALBUMIN/CREAT 24H UR: 17 MG/L
MICROALBUMIN/CREAT UR-RTO: 20 MCG/MG CREAT
TRIGL SERPL-MCNC: 1686 MG/DL
VLDLC SERPL CALC-MCNC: ABNORMAL MG/DL (ref 1–30)

## 2020-10-27 RX ORDER — ALLOPURINOL 100 MG/1
TABLET ORAL
Qty: 90 TABLET | Refills: 2 | Status: SHIPPED | OUTPATIENT
Start: 2020-10-27 | End: 2021-01-19

## 2020-10-27 NOTE — TELEPHONE ENCOUNTER
migrainosus, not intractable     Class 2 obesity due to excess calories with body mass index (BMI) of 35.0 to 35.9 in adult     Neck muscle spasm     Encounter for smoking cessation counseling     Gout tophi     Essential hypertension

## 2020-10-29 ENCOUNTER — OFFICE VISIT (OUTPATIENT)
Dept: FAMILY MEDICINE CLINIC | Age: 40
End: 2020-10-29
Payer: MEDICARE

## 2020-10-29 VITALS
SYSTOLIC BLOOD PRESSURE: 126 MMHG | HEIGHT: 70 IN | TEMPERATURE: 97.3 F | HEART RATE: 96 BPM | WEIGHT: 206 LBS | DIASTOLIC BLOOD PRESSURE: 80 MMHG | BODY MASS INDEX: 29.49 KG/M2

## 2020-10-29 PROCEDURE — 99213 OFFICE O/P EST LOW 20 MIN: CPT | Performed by: STUDENT IN AN ORGANIZED HEALTH CARE EDUCATION/TRAINING PROGRAM

## 2020-10-29 ASSESSMENT — PATIENT HEALTH QUESTIONNAIRE - PHQ9
2. FEELING DOWN, DEPRESSED OR HOPELESS: 0
SUM OF ALL RESPONSES TO PHQ QUESTIONS 1-9: 0
1. LITTLE INTEREST OR PLEASURE IN DOING THINGS: 0
SUM OF ALL RESPONSES TO PHQ9 QUESTIONS 1 & 2: 0

## 2020-10-29 ASSESSMENT — ENCOUNTER SYMPTOMS
RHINORRHEA: 0
NAUSEA: 0
SHORTNESS OF BREATH: 0
COUGH: 0
EYE PAIN: 0
SORE THROAT: 0
WHEEZING: 0
ABDOMINAL PAIN: 0
PHOTOPHOBIA: 0
VOMITING: 0

## 2020-10-29 NOTE — PROGRESS NOTES
Visit Information    Have you changed or started any medications since your last visit including any over-the-counter medicines, vitamins, or herbal medicines? no   Have you stopped taking any of your medications? Is so, why? -  no  Are you having any side effects from any of your medications? - no    Have you seen any other physician or provider since your last visit?  no   Have you had any other diagnostic tests since your last visit?  no   Have you been seen in the emergency room and/or had an admission in a hospital since we last saw you?  no   Have you had your routine dental cleaning in the past 6 months?  no     Do you have an active MyChart account? If no, what is the barrier?   Yes    Patient Care Team:  Jorje Lincoln MD as PCP - General (Family Medicine)  Jorje Lincoln MD as PCP - [facility]    Medical History Review  Past Medical, Family, and Social History reviewed and does not contribute to the patient presenting condition    Health Maintenance   Topic Date Due    Pneumococcal 0-64 years Vaccine (1 of 1 - PPSV23) 02/23/1986    Diabetic retinal exam  02/23/1990    Flu vaccine (1) 09/01/2020    Hepatitis B vaccine (1 of 3 - Risk 3-dose series) 12/18/2020 (Originally 2/23/1999)    Cervical cancer screen  04/26/2021    Potassium monitoring  07/19/2021    Creatinine monitoring  08/26/2021    Diabetic foot exam  10/07/2021    A1C test (Diabetic or Prediabetic)  10/07/2021    Diabetic microalbuminuria test  10/20/2021    Lipid screen  10/20/2021    DTaP/Tdap/Td vaccine (2 - Td) 03/23/2027    HIV screen  Completed    Hepatitis A vaccine  Aged Out    Hib vaccine  Aged Out    Meningococcal (ACWY) vaccine  Aged Out    Varicella vaccine  Discontinued

## 2020-10-29 NOTE — PROGRESS NOTES
Subjective:    Leslie Marinelli is a 36 y.o. female with  has a past medical history of Anxiety, Arthritis, Class 2 obesity due to excess calories with body mass index (BMI) of 35.0 to 35.9 in adult, Depression, Diabetes mellitus ([facility] [address] H/O tubal ligation, Headache, High cholesterol, and Osteoarthritis. Family History   Problem Relation Age of Onset    Cancer Brother         kidney    High Blood Pressure Maternal Grandmother     Other Maternal Grandmother         aneurysm    High Blood Pressure Maternal Grandfather     High Blood Pressure Paternal Grandmother     High Blood Pressure Paternal Grandfather        Presented tothe office today for:  Chief Complaint   Patient presents with   [address] Maintenance     refuse flu        HPI  Patient is a 15-year-old female here for follow-up of hypertension  Patient has been taking her chlorthalidone 25 mg every other day for the past 2 weeks  Patient is really interested in discontinuing the medication  Patient brings in log of BP for the past 2 weeks  Log reviewed with patient and shows that her blood pressure has been below 140/90  Patient has recently lost weight and is eating healthier  Patient denies any CP, SOB, HA, lightheadedness, dizziness or vision changes  Patient would like a trial of discontinuing medication      Review of Systems   Constitutional: Negative for chills and fever. HENT: Negative for congestion, rhinorrhea and sore throat. Eyes: Negative for photophobia and pain. Respiratory: Negative for cough, shortness of breath and wheezing. Cardiovascular: Negative for chest pain and palpitations. Gastrointestinal: Negative for abdominal pain, nausea and vomiting. Genitourinary: Negative for frequency and urgency. Musculoskeletal: Negative for arthralgias and myalgias. Neurological: Negative for dizziness, light-headedness and headaches.        Objective:    /80 (Site: Right Upper Arm, Position: Sitting, Cuff Size: Medium Adult)   Pulse 96   Temp 97.3 °F (36.3 °C) (Temporal)   Ht 5' 10\" (1.778 m)   Wt 206 lb (93.4 kg)   BMI 29.56 kg/m²    BP Readings from Last 3 Encounters:   10/29/20 126/80   10/07/20 133/88   08/23/20 (!) 145/102       Physical Exam  Constitutional:       General: She is not in acute distress. Appearance: She is well-developed. HENT:      Head: Normocephalic and atraumatic. Neck:      Musculoskeletal: Neck supple. Trachea: No tracheal deviation. Cardiovascular:      Rate and Rhythm: Normal rate and regular rhythm. Heart sounds: No murmur. No friction rub. No gallop. Pulmonary:      Effort: Pulmonary effort is normal.      Breath sounds: Normal breath sounds. No wheezing or rales. Abdominal:      General: There is no distension. Palpations: Abdomen is soft. There is no mass. Tenderness: There is no abdominal tenderness. Neurological:      Mental Status: She is alert and oriented to person, place, and time. Psychiatric:         Behavior: Behavior is cooperative. Lab Results   Component Value Date    WBC 4.9 08/26/2020    HGB 12.8 08/26/2020    HCT 39.0 08/26/2020     08/26/2020    CHOL 229 (H) 10/20/2020    TRIG 1,686 (H) 10/20/2020    HDL 38 (L) 10/20/2020    LDLDIRECT 31 10/20/2020    ALT 38 (H) 08/26/2020    AST 19 09/15/2017     07/19/2020    K 3.2 (L) 07/19/2020    CL 97 (L) 07/19/2020    CREATININE 0.52 08/26/2020    BUN 16 07/19/2020    CO2 22 07/19/2020    TSH 3.17 03/26/2019    LABA1C 5.5 10/07/2020    LABMICR 20 10/20/2020     Lab Results   Component Value Date    CALCIUM 8.8 07/19/2020     Lab Results   Component Value Date    LDLCHOLESTEROL      10/20/2020    LDLDIRECT 31 10/20/2020       Assessment and Plan:    1.  Essential hypertension  -Patient interested in trial to stop chlorthalidone  -Has been taking chlorthalidone every other day for the past 2 weeks  -BP log reviewed with patient and shows that it has been below 140/90  -We will discontinue chlorthalidone, patient to continue keeping log of blood pressure to be reviewed at next appointment      Requested Prescriptions      No prescriptions requested or ordered in this encounter       There are no discontinued medications. Shira Yu received counseling on the following healthy behaviors:nutrition, exercise and medication adherence    Discussed use, benefit, and side effects of prescribed medications. Barriers to medication compliance addressed. All patient questions answered. Pt voicedunderstanding. Return in about 8 weeks (around 12/24/2020) for HTN.

## 2020-11-24 RX ORDER — OMEPRAZOLE 20 MG/1
CAPSULE, DELAYED RELEASE ORAL
Qty: 30 CAPSULE | Refills: 3 | Status: SHIPPED | OUTPATIENT
Start: 2020-11-24 | End: 2022-02-10 | Stop reason: SDUPTHER

## 2020-11-24 NOTE — TELEPHONE ENCOUNTER
Panchito Request for pending medication. Last Visit Date: 10/29/2020  Next Visit Date:  No future appointments. Health Maintenance   Topic Date Due    Diabetic retinal exam  02/23/1990    Hepatitis B vaccine (1 of 3 - Risk 3-dose series) 12/18/2020 (Originally 2/23/1999)    Flu vaccine (1) 10/29/2021 (Originally 9/1/2020)    Cervical cancer screen  04/26/2021    Potassium monitoring  07/19/2021    Creatinine monitoring  08/26/2021    Diabetic foot exam  10/07/2021    A1C test (Diabetic or Prediabetic)  10/07/2021    Diabetic microalbuminuria test  10/20/2021    Lipid screen  10/20/2021    DTaP/Tdap/Td vaccine (2 - Td) 03/23/2027    HIV screen  Completed    Hepatitis A vaccine  Aged Out    Hib vaccine  Aged Out    Meningococcal (ACWY) vaccine  Aged Out    Pneumococcal 0-64 years Vaccine  Aged Out    Varicella vaccine  Discontinued       Hemoglobin A1C (%)   Date Value   10/07/2020 5.5   09/06/2019 6.0   06/18/2019 7.3 (H)             ( goal A1C is < 7)   Microalb/Crt. Ratio (mcg/mg creat)   Date Value   10/20/2020 20     LDL Cholesterol (mg/dL)   Date Value   10/20/2020            (goal LDL is <100)   AST (U/L)   Date Value   09/15/2017 19     ALT (U/L)   Date Value   08/26/2020 38 (H)     BUN (mg/dL)   Date Value   07/19/2020 16     BP Readings from Last 3 Encounters:   10/29/20 126/80   10/07/20 133/88   08/23/20 (!) 145/102          (goal 120/80)    All Future Testing planned in [facility]  Lab Frequency Next Occurrence   Basic Metabolic Panel Once 71/72/8871       Next Visit Date:  No future appointments.       Patient Active Problem List:     New persistent daily headache     Abnormal CT scan, head     Frequent headaches     Migraine without status migrainosus, not intractable     Class 2 obesity due to excess calories with body mass index (BMI) of 35.0 to 35.9 in adult     Neck muscle spasm     Encounter for smoking cessation counseling     Gout tophi     Essential hypertension

## 2020-12-02 ENCOUNTER — APPOINTMENT (OUTPATIENT)
Dept: CT IMAGING | Age: 40
End: 2020-12-02
Payer: MEDICARE

## 2020-12-02 ENCOUNTER — HOSPITAL ENCOUNTER (EMERGENCY)
Age: 40
Discharge: HOME | End: 2020-12-02
Attending: EMERGENCY MEDICINE
Payer: MEDICARE

## 2020-12-02 VITALS
SYSTOLIC BLOOD PRESSURE: 148 MMHG | TEMPERATURE: 98 F | RESPIRATION RATE: 18 BRPM | DIASTOLIC BLOOD PRESSURE: 94 MMHG | HEIGHT: 70 IN | HEART RATE: 84 BPM | BODY MASS INDEX: 30.21 KG/M2 | OXYGEN SATURATION: 99 % | WEIGHT: 211 LBS

## 2020-12-02 LAB
-: ABNORMAL
ABSOLUTE EOS #: 0.04 K/UL (ref 0–0.44)
ABSOLUTE IMMATURE GRANULOCYTE: 0.02 K/UL (ref 0–0.3)
ABSOLUTE LYMPH #: 2.19 K/UL (ref 1.1–3.7)
ABSOLUTE MONO #: 0.33 K/UL (ref 0.1–1.2)
ALBUMIN SERPL-MCNC: 4.7 G/DL (ref 3.5–5.2)
ALBUMIN/GLOBULIN RATIO: ABNORMAL (ref 1–2.5)
ALP BLD-CCNC: 63 U/L (ref 35–104)
ALT SERPL-CCNC: 32 U/L (ref 5–33)
AMORPHOUS: ABNORMAL
ANION GAP SERPL CALCULATED.3IONS-SCNC: 15 MMOL/L (ref 9–17)
AST SERPL-CCNC: 66 U/L
BACTERIA: ABNORMAL
BASOPHILS # BLD: 1 % (ref 0–2)
BASOPHILS ABSOLUTE: 0.04 K/UL (ref 0–0.2)
BILIRUB SERPL-MCNC: 1 MG/DL (ref 0.3–1.2)
BILIRUBIN DIRECT: 0.28 MG/DL
BILIRUBIN URINE: NEGATIVE
BILIRUBIN, INDIRECT: 0.72 MG/DL (ref 0–1)
BUN BLDV-MCNC: 18 MG/DL (ref 6–20)
BUN/CREAT BLD: 33 (ref 9–20)
CALCIUM SERPL-MCNC: 9.4 MG/DL (ref 8.6–10.4)
CASTS UA: ABNORMAL /LPF
CASTS UA: ABNORMAL /LPF
CHLORIDE BLD-SCNC: 102 MMOL/L (ref 98–107)
CO2: 22 MMOL/L (ref 20–31)
COLOR: YELLOW
COMMENT UA: ABNORMAL
CREAT SERPL-MCNC: 0.54 MG/DL (ref 0.5–0.9)
CRYSTALS, UA: ABNORMAL /HPF
DIFFERENTIAL TYPE: ABNORMAL
EOSINOPHILS RELATIVE PERCENT: 1 % (ref 1–4)
EPITHELIAL CELLS UA: ABNORMAL /HPF (ref 0–5)
GFR AFRICAN AMERICAN: >60 ML/MIN
GFR NON-AFRICAN AMERICAN: >60 ML/MIN
GFR SERPL CREATININE-BSD FRML MDRD: ABNORMAL ML/MIN/{1.73_M2}
GFR SERPL CREATININE-BSD FRML MDRD: ABNORMAL ML/MIN/{1.73_M2}
GLOBULIN: ABNORMAL G/DL (ref 1.5–3.8)
GLUCOSE BLD-MCNC: 105 MG/DL (ref 70–99)
GLUCOSE URINE: NEGATIVE
HCT VFR BLD CALC: 37.7 % (ref 36.3–47.1)
HEMOGLOBIN: 12 G/DL (ref 11.9–15.1)
IMMATURE GRANULOCYTES: 0 %
KETONES, URINE: NEGATIVE
LEUKOCYTE ESTERASE, URINE: ABNORMAL
LIPASE: 33 U/L (ref 13–60)
LYMPHOCYTES # BLD: 37 % (ref 24–43)
MCH RBC QN AUTO: 32.4 PG (ref 25.2–33.5)
MCHC RBC AUTO-ENTMCNC: 31.8 G/DL (ref 28.4–34.8)
MCV RBC AUTO: 101.9 FL (ref 82.6–102.9)
MONOCYTES # BLD: 6 % (ref 3–12)
MUCUS: ABNORMAL
NITRITE, URINE: NEGATIVE
NRBC AUTOMATED: 0 PER 100 WBC
OTHER OBSERVATIONS UA: ABNORMAL
PDW BLD-RTO: 13.5 % (ref 11.8–14.4)
PH UA: 6.5 (ref 5–8)
PLATELET # BLD: 224 K/UL (ref 138–453)
PLATELET ESTIMATE: ABNORMAL
PMV BLD AUTO: 9.4 FL (ref 8.1–13.5)
POTASSIUM SERPL-SCNC: 3.9 MMOL/L (ref 3.7–5.3)
PROTEIN UA: ABNORMAL
RBC # BLD: 3.7 M/UL (ref 3.95–5.11)
RBC # BLD: ABNORMAL 10*6/UL
RBC UA: ABNORMAL /HPF (ref 0–2)
RENAL EPITHELIAL, UA: ABNORMAL /HPF
SEG NEUTROPHILS: 55 % (ref 36–65)
SEGMENTED NEUTROPHILS ABSOLUTE COUNT: 3.35 K/UL (ref 1.5–8.1)
SODIUM BLD-SCNC: 139 MMOL/L (ref 135–144)
SPECIFIC GRAVITY UA: 1.03 (ref 1–1.03)
TOTAL PROTEIN: 7.2 G/DL (ref 6.4–8.3)
TRICHOMONAS: ABNORMAL
TURBIDITY: ABNORMAL
URINE HGB: ABNORMAL
UROBILINOGEN, URINE: NORMAL
WBC # BLD: 6 K/UL (ref 3.5–11.3)
WBC # BLD: ABNORMAL 10*3/UL
WBC UA: ABNORMAL /HPF (ref 0–5)
YEAST: ABNORMAL

## 2020-12-02 PROCEDURE — 6360000002 HC RX W HCPCS: Performed by: EMERGENCY MEDICINE

## 2020-12-02 PROCEDURE — 96375 TX/PRO/DX INJ NEW DRUG ADDON: CPT

## 2020-12-02 PROCEDURE — 2580000003 HC RX 258: Performed by: EMERGENCY MEDICINE

## 2020-12-02 PROCEDURE — 80076 HEPATIC FUNCTION PANEL: CPT

## 2020-12-02 PROCEDURE — 80048 BASIC METABOLIC PNL TOTAL CA: CPT

## 2020-12-02 PROCEDURE — 83690 ASSAY OF LIPASE: CPT

## 2020-12-02 PROCEDURE — 99283 EMERGENCY DEPT VISIT LOW MDM: CPT

## 2020-12-02 PROCEDURE — 85025 COMPLETE CBC W/AUTO DIFF WBC: CPT

## 2020-12-02 PROCEDURE — 74176 CT ABD & PELVIS W/O CONTRAST: CPT

## 2020-12-02 PROCEDURE — 81025 URINE PREGNANCY TEST: CPT

## 2020-12-02 PROCEDURE — 96374 THER/PROPH/DIAG INJ IV PUSH: CPT

## 2020-12-02 PROCEDURE — 81001 URINALYSIS AUTO W/SCOPE: CPT

## 2020-12-02 RX ORDER — ONDANSETRON 2 MG/ML
4 INJECTION INTRAMUSCULAR; INTRAVENOUS ONCE
Status: COMPLETED | OUTPATIENT
Start: 2020-12-02 | End: 2020-12-02

## 2020-12-02 RX ORDER — FENTANYL CITRATE 50 UG/ML
50 INJECTION, SOLUTION INTRAMUSCULAR; INTRAVENOUS ONCE
Status: COMPLETED | OUTPATIENT
Start: 2020-12-02 | End: 2020-12-02

## 2020-12-02 RX ORDER — 0.9 % SODIUM CHLORIDE 0.9 %
1000 INTRAVENOUS SOLUTION INTRAVENOUS ONCE
Status: COMPLETED | OUTPATIENT
Start: 2020-12-02 | End: 2020-12-02

## 2020-12-02 RX ADMIN — FENTANYL CITRATE 50 MCG: 50 INJECTION, SOLUTION INTRAMUSCULAR; INTRAVENOUS at 14:53

## 2020-12-02 RX ADMIN — SODIUM CHLORIDE 1000 ML: 9 INJECTION, SOLUTION INTRAVENOUS at 14:53

## 2020-12-02 RX ADMIN — ONDANSETRON 4 MG: 2 INJECTION INTRAMUSCULAR; INTRAVENOUS at 14:53

## 2020-12-02 ASSESSMENT — ENCOUNTER SYMPTOMS
EYE REDNESS: 0
RHINORRHEA: 0
VOMITING: 0
EYE DISCHARGE: 0
SHORTNESS OF BREATH: 0
SORE THROAT: 0
ABDOMINAL PAIN: 1
COUGH: 0
DIARRHEA: 0
COLOR CHANGE: 0
NAUSEA: 1

## 2020-12-02 ASSESSMENT — PAIN SCALES - GENERAL
PAINLEVEL_OUTOF10: 10
PAINLEVEL_OUTOF10: 10
PAINLEVEL_OUTOF10: 5

## 2020-12-02 NOTE — ED PROVIDER NOTES
EMERGENCY DEPARTMENT ENCOUNTER    Pt Name: Hoda Juan  MRN: [id number]  [place] 1980  Date of evaluation: 12/2/20  CHIEF COMPLAINT       Chief Complaint   Patient presents with    Abdominal Pain     states began this am. she states she has had issues with an \"abscess\" in her belly button that is now gone but still causing her pain. denies N/V/D, fever, cough, sob     HISTORY OF PRESENT ILLNESS   This is a 49-year-old female that presents with complaints of periumbilical and right lower quadrant abdominal pain. Patient states that she has had issues with this previously, she states that she was seen in the he thought maybe she had an abscess or something else in her bellybutton but she did not take any medications. The patient states that she has severe pain and discomfort in her periumbilical area and in the bellybutton itself with some pain and discomfort in the right lower quadrant. No previous history of acute appendicitis or other GI abdominal surgery. Patient denies any dysuria hematuria, no vaginal bleeding or discharge. She describes her symptoms as severe. No appetite changes. REVIEW OF SYSTEMS     Review of Systems   Constitutional: Negative for chills and fever. HENT: Negative for rhinorrhea and sore throat. Eyes: Negative for discharge, redness and visual disturbance. Respiratory: Negative for cough and shortness of breath. Cardiovascular: Negative for chest pain, palpitations and leg swelling. Gastrointestinal: Positive for abdominal pain and nausea. Negative for diarrhea and vomiting. Genitourinary: Negative for dysuria and hematuria. Musculoskeletal: Negative for arthralgias, myalgias and neck pain. Skin: Negative for color change and rash. Neurological: Negative for seizures, weakness and headaches. Psychiatric/Behavioral: Negative for hallucinations, self-injury and suicidal ideas.      PASTMEDICAL HISTORY     Past Medical History:   Diagnosis Date    Anxiety     Arthritis     knees and hands    Class 2 obesity due to excess calories with body mass index (BMI) of 35.0 to 35.9 in adult 10/4/2018    Depression     Diabetes mellitus ([facility] [address]     DIET CONTROLLED    H/O tubal ligation 2004    Headache     High cholesterol     Osteoarthritis      Past Problem List  Patient Active Problem List   Diagnosis Code    New persistent daily headache G44.52    Abnormal CT scan, head R93.0    Frequent headaches R51.9    Migraine without status migrainosus, not intractable G43.909    Class 2 obesity due to excess calories with body mass index (BMI) of 35.0 to 35.9 in adult E66.09, Z68.35    Neck muscle spasm M62.838    Encounter for smoking cessation counseling Z71.6    Gout tophi M1A. 9XX1    Essential hypertension I10     SURGICAL HISTORY       Past Surgical History:   Procedure Laterality Date    ARM SURGERY Left     FOOT NEUROMA SURGERY Left 8/15/2019    EXCISION LEFT HALLUX BENIGN SOFT TISSUE performed by Farnaz Lin DPM at [facility] forearm with plate 9 years ago    TOE SURGERY Left 08/15/2019    Excision of soft tissue mass, left hallux    TUBAL LIGATION  2004    WISDOM TOOTH EXTRACTION Bilateral      CURRENT MEDICATIONS       Current Discharge Medication List      CONTINUE these medications which have NOT CHANGED    Details   omeprazole (PRILOSEC) 20 MG delayed release capsule take 1 capsule by mouth once daily  Qty: 30 capsule, Refills: 3    Associated Diagnoses: Gastroesophageal reflux disease      allopurinol (ZYLOPRIM) 100 MG tablet take 3 tablets by mouth once daily  Qty: 90 tablet, Refills: 2    Associated Diagnoses: Gout tophi      Blood Pressure Monitoring (B-D ASSURE BPM/AUTO ARM CUFF) MISC 1 Device by Does not apply route once for 1 dose  Qty: 1 each, Refills: 0    Associated Diagnoses: Essential hypertension      chlorthalidone (HYGROTON) 25 MG tablet take 1 tablet by mouth once daily  Qty: 30 tablet, Refills: 3    Associated Diagnoses: Essential hypertension      atorvastatin (LIPITOR) 40 MG tablet Take 1 tablet by mouth daily  Qty: 90 tablet, Refills: 1    Associated Diagnoses: Hyperlipidemia, unspecified hyperlipidemia type      celecoxib (CELEBREX) 200 MG capsule Refills: 0           ALLERGIES     is allergic to iodine and shellfish-derived products. FAMILY HISTORY     She indicated that her mother is alive. She indicated that her father is alive. She indicated that her brother is . She indicated that the status of her maternal grandmother is unknown. She indicated that the status of her maternal grandfather is unknown. She indicated that the status of her paternal grandmother is unknown. She indicated that the status of her paternal grandfather is unknown. SOCIAL HISTORY       Social History     Tobacco Use    Smoking status: Former Smoker     Last attempt to quit: 10/2018     Years since quittin.1    Smokeless tobacco: Never Used   Substance Use Topics    Alcohol use: Yes     Alcohol/week: 0.0 standard drinks     Comment: social    Drug use: No     PHYSICAL EXAM     INITIAL VITALS: BP (!) 148/94   Pulse 84   Temp 98 °F (36.7 °C) (Oral)   Resp 18   Ht 5' 10\" (1.778 m)   Wt 211 lb (95.7 kg)   LMP 2020   SpO2 99%   BMI 30.28 kg/m²    Physical Exam  Constitutional:       Appearance: Normal appearance. She is well-developed. She is not ill-appearing or toxic-appearing. HENT:      Head: Normocephalic and atraumatic. Eyes:      Conjunctiva/sclera: Conjunctivae normal.      Pupils: Pupils are equal, round, and reactive to light. Neck:      Musculoskeletal: Normal range of motion and neck supple. Trachea: Trachea normal.   Cardiovascular:      Rate and Rhythm: Normal rate and regular rhythm. Heart sounds: S1 normal and S2 normal. No murmur. Pulmonary:      Effort: Pulmonary effort is normal. No accessory muscle usage or respiratory distress.       Breath sounds: Normal breath sounds. Chest:      Chest wall: No deformity or tenderness. Abdominal:      General: Bowel sounds are normal. There is no distension or abdominal bruit. Palpations: Abdomen is not rigid. Tenderness: There is abdominal tenderness in the right lower quadrant and periumbilical area. There is guarding. There is no rebound. Negative signs include Hancock's sign and McBurney's sign. Skin:     General: Skin is warm. Findings: No rash. Neurological:      Mental Status: She is alert and oriented to person, place, and time. GCS: GCS eye subscore is 4. GCS verbal subscore is 5. GCS motor subscore is 6. Psychiatric:         Speech: Speech normal.         MEDICAL DECISION MAKIN-year-old female presents with complaints of abdominal pain, plan is ASIC labs, CT of abdomen and pelvis without contrast due to her contrast allergy. 4:41 PM EST  Patient reevaluated, the patient CT scan shows evidence of an umbilical hernia with a small piece of small bowel, there is no evidence of obstruction. On repeat exam I do not appreciate any bowel through the hernia, the patient has absolutely no pain or discomfort, there is nothing to suggest incarceration or strangulation, patient be discharged home with outpatient follow-up with general surgery and return if symptoms worsen or change. CRITICAL CARE:       PROCEDURES:    Procedures    DIAGNOSTIC RESULTS   EKG:All EKG's are interpreted by the Emergency Department Physician who either signs or Co-signs this chart in the absence of a cardiologist.        RADIOLOGY:All plain film, CT, MRI, and formal ultrasound images (except ED bedside ultrasound) are read by the radiologist, see reports below, unless otherwisenoted in MDM or here. CT ABDOMEN PELVIS WO CONTRAST   Final Result   There is a small umbilical hernia containing a partial segment of   nonobstructed small bowel and mesenteric fat.       There is a 3.6 cm hypodense lesion in the left adnexa, recommend follow-up   pelvic ultrasound in 6-12 weeks. Hepatomegaly. Left hemicolon diverticulosis without evidence of diverticulitis. LABS: All lab results were reviewed by myself, and all abnormals are listed below. Labs Reviewed   BASIC METABOLIC PANEL - Abnormal; Notable for the following components:       Result Value    Glucose 105 (*)     Bun/Cre Ratio 33 (*)     All other components within normal limits   CBC WITH AUTO DIFFERENTIAL - Abnormal; Notable for the following components:    RBC 3.70 (*)     All other components within normal limits   HEPATIC FUNCTION PANEL - Abnormal; Notable for the following components:    AST 66 (*)     All other components within normal limits   URINALYSIS - Abnormal; Notable for the following components:    Turbidity UA SLIGHTLY CLOUDY (*)     Specific Gravity, UA 1.032 (*)     Urine Hgb 3+ (*)     Protein, UA TRACE (*)     Leukocyte Esterase, Urine TRACE (*)     All other components within normal limits   MICROSCOPIC URINALYSIS - Abnormal; Notable for the following components:    Bacteria, UA MODERATE (*)     Mucus, UA 1+ (*)     All other components within normal limits   LIPASE       EMERGENCY DEPARTMENTCOURSE:         Vitals:    Vitals:    12/02/20 1411   BP: (!) 148/94   Pulse: 84   Resp: 18   Temp: 98 °F (36.7 °C)   TempSrc: Oral   SpO2: 99%   Weight: 211 lb (95.7 kg)   Height: 5' 10\" (1.778 m)       The patient was given the following medications while in the emergency department:  Orders Placed This Encounter   Medications    0.9 % sodium chloride bolus    ondansetron (ZOFRAN) injection 4 mg    fentaNYL (SUBLIMAZE) injection 50 mcg     CONSULTS:  None    FINAL IMPRESSION      1.  Umbilical hernia without obstruction and without gangrene          DISPOSITION/PLAN   DISPOSITION Decision To Discharge 12/02/2020 04:40:44 PM      PATIENT REFERRED TO:  Filiberto Laughlin MD  [address] [facility] [address] [facility]  [phone]    Schedule an appointment as soon as possible for a visit in 2 days      DISCHARGE MEDICATIONS:  Current Discharge Medication List        Angelia Sandhoff, MD  Attending Emergency Physician                   Angelia Sandhoff, MD  12/02/20 0529

## 2020-12-03 LAB — HCG, PREGNANCY URINE (POC): NEGATIVE

## 2021-01-19 DIAGNOSIS — M1A.9XX1 GOUT TOPHI: ICD-10-CM

## 2021-01-19 RX ORDER — ALLOPURINOL 100 MG/1
TABLET ORAL
Qty: 90 TABLET | Refills: 2 | Status: SHIPPED | OUTPATIENT
Start: 2021-01-19 | End: 2021-05-21 | Stop reason: SDUPTHER

## 2021-01-19 NOTE — TELEPHONE ENCOUNTER
Last visit: 10/29/20  Last Med refill: 12/22/20  Does patient have enough medication for 72 hours: Yes    Next Visit Date:  No future appointments. Health Maintenance   Topic Date Due    Hepatitis C screen  1980    Pneumococcal 0-64 years Vaccine (1 of 1 - PPSV23) 02/23/1986    Diabetic retinal exam  02/23/1990    Hepatitis B vaccine (1 of 3 - Risk 3-dose series) 02/23/1999    Flu vaccine (1) 10/29/2021 (Originally 9/1/2020)    Cervical cancer screen  04/26/2021    Diabetic foot exam  10/07/2021    A1C test (Diabetic or Prediabetic)  10/07/2021    Diabetic microalbuminuria test  10/20/2021    Lipid screen  10/20/2021    Potassium monitoring  12/02/2021    Creatinine monitoring  12/02/2021    DTaP/Tdap/Td vaccine (2 - Td) 03/23/2027    HIV screen  Completed    Hepatitis A vaccine  Aged Out    Hib vaccine  Aged Out    Meningococcal (ACWY) vaccine  Aged Out    Varicella vaccine  Discontinued       Hemoglobin A1C (%)   Date Value   10/07/2020 5.5   09/06/2019 6.0   06/18/2019 7.3 (H)             ( goal A1C is < 7)   Microalb/Crt.  Ratio (mcg/mg creat)   Date Value   10/20/2020 20     LDL Cholesterol (mg/dL)   Date Value   10/20/2020        06/18/2019            (goal LDL is <100)   AST (U/L)   Date Value   12/02/2020 66 (H)     ALT (U/L)   Date Value   12/02/2020 32     BUN (mg/dL)   Date Value   12/02/2020 18     BP Readings from Last 3 Encounters:   12/02/20 (!) 148/94   10/29/20 126/80   10/07/20 133/88          (goal 120/80)    All Future Testing planned in [facility]              Patient Active Problem List:     New persistent daily headache     Abnormal CT scan, head     Frequent headaches     Migraine without status migrainosus, not intractable     Class 2 obesity due to excess calories with body mass index (BMI) of 35.0 to 35.9 in adult     Neck muscle spasm     Encounter for smoking cessation counseling     Gout tophi     Essential hypertension

## 2021-02-16 DIAGNOSIS — E78.5 HYPERLIPIDEMIA, UNSPECIFIED HYPERLIPIDEMIA TYPE: ICD-10-CM

## 2021-02-16 NOTE — TELEPHONE ENCOUNTER
Last visit: 10/29/20  Last Med refill: 1/19/21  Does patient have enough medication for 72 hours: Yes    Next Visit Date:  No future appointments. Health Maintenance   Topic Date Due    Hepatitis C screen  1980    Pneumococcal 0-64 years Vaccine (1 of 1 - PPSV23) 02/23/1986    Diabetic retinal exam  02/23/1990    Hepatitis B vaccine (1 of 3 - Risk 3-dose series) 02/23/1999    Flu vaccine (1) 10/29/2021 (Originally 9/1/2020)    Cervical cancer screen  04/26/2021    Diabetic foot exam  10/07/2021    A1C test (Diabetic or Prediabetic)  10/07/2021    Diabetic microalbuminuria test  10/20/2021    Lipid screen  10/20/2021    Potassium monitoring  12/02/2021    Creatinine monitoring  12/02/2021    DTaP/Tdap/Td vaccine (2 - Td) 03/23/2027    HIV screen  Completed    Hepatitis A vaccine  Aged Out    Hib vaccine  Aged Out    Meningococcal (ACWY) vaccine  Aged Out    Varicella vaccine  Discontinued       Hemoglobin A1C (%)   Date Value   10/07/2020 5.5   09/06/2019 6.0   06/18/2019 7.3 (H)             ( goal A1C is < 7)   Microalb/Crt.  Ratio (mcg/mg creat)   Date Value   10/20/2020 20     LDL Cholesterol (mg/dL)   Date Value   10/20/2020        06/18/2019            (goal LDL is <100)   AST (U/L)   Date Value   12/02/2020 66 (H)     ALT (U/L)   Date Value   12/02/2020 32     BUN (mg/dL)   Date Value   12/02/2020 18     BP Readings from Last 3 Encounters:   12/02/20 (!) 148/94   10/29/20 126/80   10/07/20 133/88          (goal 120/80)    All Future Testing planned in [facility]              Patient Active Problem List:     New persistent daily headache     Abnormal CT scan, head     Frequent headaches     Migraine without status migrainosus, not intractable     Class 2 obesity due to excess calories with body mass index (BMI) of 35.0 to 35.9 in adult     Neck muscle spasm     Encounter for smoking cessation counseling     Gout tophi     Essential hypertension

## 2021-02-17 ENCOUNTER — HOSPITAL ENCOUNTER (OUTPATIENT)
Age: 41
Setting detail: SPECIMEN
Discharge: HOME | End: 2021-02-17
Payer: MEDICARE

## 2021-02-17 LAB
ABSOLUTE EOS #: 0.07 K/UL (ref 0–0.44)
ABSOLUTE IMMATURE GRANULOCYTE: <0.03 K/UL (ref 0–0.3)
ABSOLUTE LYMPH #: 2.03 K/UL (ref 1.1–3.7)
ABSOLUTE MONO #: 0.25 K/UL (ref 0.1–1.2)
ALT SERPL-CCNC: 37 U/L (ref 5–33)
BASOPHILS # BLD: 1 % (ref 0–2)
BASOPHILS ABSOLUTE: 0.04 K/UL (ref 0–0.2)
CREAT SERPL-MCNC: 0.46 MG/DL (ref 0.5–0.9)
DIFFERENTIAL TYPE: ABNORMAL
EOSINOPHILS RELATIVE PERCENT: 2 % (ref 1–4)
GFR AFRICAN AMERICAN: >60 ML/MIN
GFR NON-AFRICAN AMERICAN: >60 ML/MIN
GFR SERPL CREATININE-BSD FRML MDRD: ABNORMAL ML/MIN/{1.73_M2}
GFR SERPL CREATININE-BSD FRML MDRD: ABNORMAL ML/MIN/{1.73_M2}
HCT VFR BLD CALC: 40.6 % (ref 36.3–47.1)
HEMOGLOBIN: 13.3 G/DL (ref 11.9–15.1)
IMMATURE GRANULOCYTES: 0 %
LYMPHOCYTES # BLD: 50 % (ref 24–43)
MCH RBC QN AUTO: 31.4 PG (ref 25.2–33.5)
MCHC RBC AUTO-ENTMCNC: 32.8 G/DL (ref 28.4–34.8)
MCV RBC AUTO: 96 FL (ref 82.6–102.9)
MONOCYTES # BLD: 6 % (ref 3–12)
NRBC AUTOMATED: 0 PER 100 WBC
PDW BLD-RTO: 13.1 % (ref 11.8–14.4)
PLATELET # BLD: 218 K/UL (ref 138–453)
PLATELET ESTIMATE: ABNORMAL
PMV BLD AUTO: 10.4 FL (ref 8.1–13.5)
RBC # BLD: 4.23 M/UL (ref 3.95–5.11)
RBC # BLD: ABNORMAL 10*6/UL
SEG NEUTROPHILS: 41 % (ref 36–65)
SEGMENTED NEUTROPHILS ABSOLUTE COUNT: 1.67 K/UL (ref 1.5–8.1)
URIC ACID: 3.6 MG/DL (ref 2.4–5.7)
WBC # BLD: 4.1 K/UL (ref 3.5–11.3)
WBC # BLD: ABNORMAL 10*3/UL

## 2021-02-18 RX ORDER — ATORVASTATIN CALCIUM 40 MG/1
TABLET, FILM COATED ORAL
Qty: 90 TABLET | Refills: 1 | Status: SHIPPED | OUTPATIENT
Start: 2021-02-18 | End: 2022-02-10 | Stop reason: SDUPTHER

## 2021-03-25 PROBLEM — E11.9 TYPE 2 DIABETES MELLITUS WITHOUT COMPLICATION, WITHOUT LONG-TERM CURRENT USE OF INSULIN (HCC): Status: ACTIVE | Noted: 2021-03-25

## 2021-08-05 ENCOUNTER — HOSPITAL ENCOUNTER (OUTPATIENT)
Age: 41
Setting detail: SPECIMEN
Discharge: HOME | End: 2021-08-05
Payer: MEDICARE

## 2021-08-05 LAB
ABSOLUTE EOS #: 0.06 K/UL (ref 0–0.44)
ABSOLUTE IMMATURE GRANULOCYTE: <0.03 K/UL (ref 0–0.3)
ABSOLUTE LYMPH #: 1.83 K/UL (ref 1.1–3.7)
ABSOLUTE MONO #: 0.22 K/UL (ref 0.1–1.2)
ALT SERPL-CCNC: 23 U/L (ref 5–33)
BASOPHILS # BLD: 1 % (ref 0–2)
BASOPHILS ABSOLUTE: 0.03 K/UL (ref 0–0.2)
CREAT SERPL-MCNC: 0.48 MG/DL (ref 0.5–0.9)
DIFFERENTIAL TYPE: ABNORMAL
EOSINOPHILS RELATIVE PERCENT: 2 % (ref 1–4)
GFR AFRICAN AMERICAN: >60 ML/MIN
GFR NON-AFRICAN AMERICAN: >60 ML/MIN
GFR SERPL CREATININE-BSD FRML MDRD: ABNORMAL ML/MIN/{1.73_M2}
GFR SERPL CREATININE-BSD FRML MDRD: ABNORMAL ML/MIN/{1.73_M2}
HCT VFR BLD CALC: 40.8 % (ref 36.3–47.1)
HEMOGLOBIN: 13.3 G/DL (ref 11.9–15.1)
IMMATURE GRANULOCYTES: 0 %
LYMPHOCYTES # BLD: 49 % (ref 24–43)
MCH RBC QN AUTO: 33.1 PG (ref 25.2–33.5)
MCHC RBC AUTO-ENTMCNC: 32.6 G/DL (ref 28.4–34.8)
MCV RBC AUTO: 101.5 FL (ref 82.6–102.9)
MONOCYTES # BLD: 6 % (ref 3–12)
NRBC AUTOMATED: 0 PER 100 WBC
PDW BLD-RTO: 12.8 % (ref 11.8–14.4)
PLATELET # BLD: 202 K/UL (ref 138–453)
PLATELET ESTIMATE: ABNORMAL
PMV BLD AUTO: 11 FL (ref 8.1–13.5)
RBC # BLD: 4.02 M/UL (ref 3.95–5.11)
RBC # BLD: ABNORMAL 10*6/UL
SEG NEUTROPHILS: 42 % (ref 36–65)
SEGMENTED NEUTROPHILS ABSOLUTE COUNT: 1.57 K/UL (ref 1.5–8.1)
URIC ACID: 3.4 MG/DL (ref 2.4–5.7)
WBC # BLD: 3.7 K/UL (ref 3.5–11.3)
WBC # BLD: ABNORMAL 10*3/UL

## 2021-10-25 ENCOUNTER — HOSPITAL ENCOUNTER (EMERGENCY)
Age: 41
Discharge: HOME | End: 2021-10-25
Attending: EMERGENCY MEDICINE
Payer: COMMERCIAL

## 2021-10-25 ENCOUNTER — APPOINTMENT (OUTPATIENT)
Dept: CT IMAGING | Age: 41
End: 2021-10-25
Payer: COMMERCIAL

## 2021-10-25 VITALS
HEART RATE: 97 BPM | RESPIRATION RATE: 18 BRPM | OXYGEN SATURATION: 100 % | DIASTOLIC BLOOD PRESSURE: 116 MMHG | TEMPERATURE: 98.1 F | SYSTOLIC BLOOD PRESSURE: 150 MMHG | WEIGHT: 210.9 LBS | HEIGHT: 70 IN | BODY MASS INDEX: 30.19 KG/M2

## 2021-10-25 DIAGNOSIS — R10.9 ABDOMINAL PAIN, UNSPECIFIED ABDOMINAL LOCATION: Primary | ICD-10-CM

## 2021-10-25 LAB
ABSOLUTE EOS #: 0.04 K/UL (ref 0–0.44)
ABSOLUTE IMMATURE GRANULOCYTE: 0.01 K/UL (ref 0–0.3)
ABSOLUTE LYMPH #: 1.76 K/UL (ref 1.1–3.7)
ABSOLUTE MONO #: 0.25 K/UL (ref 0.1–1.2)
ALBUMIN SERPL-MCNC: 4.1 G/DL (ref 3.5–5.2)
ALBUMIN/GLOBULIN RATIO: ABNORMAL (ref 1–2.5)
ALP BLD-CCNC: 87 U/L (ref 35–104)
ALT SERPL-CCNC: 47 U/L (ref 5–33)
ANION GAP SERPL CALCULATED.3IONS-SCNC: 14 MMOL/L (ref 9–17)
AST SERPL-CCNC: 123 U/L
BASOPHILS # BLD: 1 % (ref 0–2)
BASOPHILS ABSOLUTE: 0.04 K/UL (ref 0–0.2)
BILIRUB SERPL-MCNC: 1.04 MG/DL (ref 0.3–1.2)
BILIRUBIN DIRECT: 0.25 MG/DL
BILIRUBIN, INDIRECT: 0.79 MG/DL (ref 0–1)
BUN BLDV-MCNC: 15 MG/DL (ref 6–20)
BUN/CREAT BLD: ABNORMAL (ref 9–20)
CALCIUM SERPL-MCNC: 8.1 MG/DL (ref 8.6–10.4)
CHLORIDE BLD-SCNC: 105 MMOL/L (ref 98–107)
CO2: 19 MMOL/L (ref 20–31)
CREAT SERPL-MCNC: <0.4 MG/DL (ref 0.5–0.9)
DIFFERENTIAL TYPE: ABNORMAL
EOSINOPHILS RELATIVE PERCENT: 1 % (ref 1–4)
GFR AFRICAN AMERICAN: ABNORMAL ML/MIN
GFR NON-AFRICAN AMERICAN: ABNORMAL ML/MIN
GFR SERPL CREATININE-BSD FRML MDRD: ABNORMAL ML/MIN/{1.73_M2}
GFR SERPL CREATININE-BSD FRML MDRD: ABNORMAL ML/MIN/{1.73_M2}
GLOBULIN: ABNORMAL G/DL (ref 1.5–3.8)
GLUCOSE BLD-MCNC: 134 MG/DL (ref 70–99)
HCT VFR BLD CALC: 41.3 % (ref 36.3–47.1)
HEMOGLOBIN: 13.9 G/DL (ref 11.9–15.1)
IMMATURE GRANULOCYTES: 0 %
LIPASE: 41 U/L (ref 13–60)
LYMPHOCYTES # BLD: 45 % (ref 24–43)
MCH RBC QN AUTO: 33 PG (ref 25.2–33.5)
MCHC RBC AUTO-ENTMCNC: 33.7 G/DL (ref 28.4–34.8)
MCV RBC AUTO: 98.1 FL (ref 82.6–102.9)
MONOCYTES # BLD: 6 % (ref 3–12)
NRBC AUTOMATED: 0 PER 100 WBC
PDW BLD-RTO: 14.6 % (ref 11.8–14.4)
PLATELET # BLD: 179 K/UL (ref 138–453)
PLATELET ESTIMATE: ABNORMAL
PMV BLD AUTO: 9.9 FL (ref 8.1–13.5)
POTASSIUM SERPL-SCNC: 4 MMOL/L (ref 3.7–5.3)
RBC # BLD: 4.21 M/UL (ref 3.95–5.11)
RBC # BLD: ABNORMAL 10*6/UL
SEG NEUTROPHILS: 47 % (ref 36–65)
SEGMENTED NEUTROPHILS ABSOLUTE COUNT: 1.83 K/UL (ref 1.5–8.1)
SODIUM BLD-SCNC: 138 MMOL/L (ref 135–144)
TOTAL PROTEIN: 6.6 G/DL (ref 6.4–8.3)
WBC # BLD: 3.9 K/UL (ref 3.5–11.3)
WBC # BLD: ABNORMAL 10*3/UL

## 2021-10-25 PROCEDURE — 6360000002 HC RX W HCPCS: Performed by: NURSE PRACTITIONER

## 2021-10-25 PROCEDURE — 83690 ASSAY OF LIPASE: CPT

## 2021-10-25 PROCEDURE — 74176 CT ABD & PELVIS W/O CONTRAST: CPT

## 2021-10-25 PROCEDURE — 99283 EMERGENCY DEPT VISIT LOW MDM: CPT

## 2021-10-25 PROCEDURE — 96375 TX/PRO/DX INJ NEW DRUG ADDON: CPT

## 2021-10-25 PROCEDURE — 80048 BASIC METABOLIC PNL TOTAL CA: CPT

## 2021-10-25 PROCEDURE — 96374 THER/PROPH/DIAG INJ IV PUSH: CPT

## 2021-10-25 PROCEDURE — 85025 COMPLETE CBC W/AUTO DIFF WBC: CPT

## 2021-10-25 PROCEDURE — 80076 HEPATIC FUNCTION PANEL: CPT

## 2021-10-25 RX ORDER — ONDANSETRON 2 MG/ML
4 INJECTION INTRAMUSCULAR; INTRAVENOUS ONCE
Status: COMPLETED | OUTPATIENT
Start: 2021-10-25 | End: 2021-10-25

## 2021-10-25 RX ORDER — HYDROCODONE BITARTRATE AND ACETAMINOPHEN 5; 325 MG/1; MG/1
1 TABLET ORAL EVERY 6 HOURS PRN
Qty: 12 TABLET | Refills: 0 | Status: SHIPPED | OUTPATIENT
Start: 2021-10-25 | End: 2021-10-28

## 2021-10-25 RX ORDER — MORPHINE SULFATE 4 MG/ML
4 INJECTION, SOLUTION INTRAMUSCULAR; INTRAVENOUS ONCE
Status: COMPLETED | OUTPATIENT
Start: 2021-10-25 | End: 2021-10-25

## 2021-10-25 RX ORDER — ONDANSETRON 4 MG/1
4 TABLET, ORALLY DISINTEGRATING ORAL EVERY 8 HOURS PRN
Qty: 20 TABLET | Refills: 0 | Status: SHIPPED | OUTPATIENT
Start: 2021-10-25 | End: 2022-09-08

## 2021-10-25 RX ADMIN — ONDANSETRON 4 MG: 2 INJECTION INTRAMUSCULAR; INTRAVENOUS at 12:16

## 2021-10-25 RX ADMIN — MORPHINE SULFATE 4 MG: 4 INJECTION, SOLUTION INTRAMUSCULAR; INTRAVENOUS at 12:16

## 2021-10-25 ASSESSMENT — PAIN DESCRIPTION - ORIENTATION: ORIENTATION: RIGHT;LOWER;MID

## 2021-10-25 ASSESSMENT — PAIN DESCRIPTION - DESCRIPTORS: DESCRIPTORS: SHARP;PRESSURE

## 2021-10-25 ASSESSMENT — PAIN SCALES - GENERAL
PAINLEVEL_OUTOF10: 10
PAINLEVEL_OUTOF10: 6

## 2021-10-25 ASSESSMENT — PAIN DESCRIPTION - FREQUENCY: FREQUENCY: CONTINUOUS

## 2021-10-25 ASSESSMENT — PAIN DESCRIPTION - ONSET: ONSET: ON-GOING

## 2021-10-25 ASSESSMENT — PAIN DESCRIPTION - LOCATION: LOCATION: ABDOMEN

## 2021-10-25 NOTE — ED PROVIDER NOTES
The patient was seen and examined by me in conjunction with the mid-level provider. I agree with his/her assessment and treatment plan. Extensive work-up here again is negative and she will follow up with her doctor. Findings were discussed with the patient. The pain is only in the right upper quadrant.      Bulmaro Batista MD  10/25/21 1462

## 2021-10-26 ASSESSMENT — ENCOUNTER SYMPTOMS
RHINORRHEA: 0
SINUS PRESSURE: 0
COUGH: 0
CONSTIPATION: 0
VOMITING: 0
NAUSEA: 0
DIARRHEA: 0
COLOR CHANGE: 0
ABDOMINAL PAIN: 0
SORE THROAT: 0
WHEEZING: 0
SHORTNESS OF BREATH: 0

## 2021-10-26 NOTE — ED PROVIDER NOTES
[address] [place] [address] ED  eMERGENCY dEPARTMENT eNCOUnter      Pt Name: Vi Craft  MRN: [id number]  [place] 1980  Date of evaluation: 10/25/2021  Provider: Carey Frye NP, TEJAS - [address]       Chief Complaint   Patient presents with    Abdominal Pain     since March; states pain is severe today         HISTORY OF PRESENT ILLNESS  (Location/Symptom, Timing/Onset, Context/Setting, Quality, Duration, Modifying Factors, Severity.)   Vi Craft is a 39 y.o. female who presents to the emergency department by private vehicle for evaluation abdominal pain. Patient has a right mid abdominal pain that has been going on for the last 6 months. She states that the pain became severe over the last couple of days. She describes it as a sharp pain that she rates a 6 on a 0-to-10 scale. She has had outpatient work-up to include a gallbladder ultrasound, HIDA scan and upper GI. sHe states all of these have came back negative. She sees  Dr. Grover Cruz Notes were reviewed.     ALLERGIES     Iodine and Shellfish-derived products    CURRENT MEDICATIONS       Discharge Medication List as of 10/25/2021  1:26 PM      CONTINUE these medications which have NOT CHANGED    Details   allopurinol (ZYLOPRIM) 100 MG tablet Take 3 tablets by mouth daily, Disp-90 tablet, R-11Normal      atorvastatin (LIPITOR) 40 MG tablet take 1 tablet by mouth once daily, Disp-90 tablet, R-1Normal      omeprazole (PRILOSEC) 20 MG delayed release capsule take 1 capsule by mouth once daily, Disp-30 capsule,R-3Normal      celecoxib (CELEBREX) 200 MG capsule R-0Historical Med             PAST MEDICAL HISTORY         Diagnosis Date    Anxiety     Arthritis     knees and hands    Class 2 obesity due to excess calories with body mass index (BMI) of 35.0 to 35.9 in adult 10/4/2018    Depression     Diabetes mellitus ([facility] [address]     DIET CONTROLLED    H/O tubal ligation 2004    Headache     High cholesterol     Osteoarthritis        SURGICAL HISTORY           Procedure Laterality Date    ARM SURGERY Left     FOOT NEUROMA SURGERY Left 8/15/2019    EXCISION LEFT HALLUX BENIGN SOFT TISSUE performed by Bentley Muñoz DPM at [address] forearm with plate 9 years ago    TOE SURGERY Left 08/15/2019    Excision of soft tissue mass, left hallux    TUBAL LIGATION  2004    WISDOM TOOTH EXTRACTION Bilateral          FAMILY HISTORY           Problem Relation Age of Onset    Cancer Brother         kidney    High Blood Pressure Maternal Grandmother     Other Maternal Grandmother         aneurysm    High Blood Pressure Maternal Grandfather     High Blood Pressure Paternal Grandmother     High Blood Pressure Paternal Grandfather      Family Status   Relation Name Status    Brother      MGM  (Not Specified)    MGF  (Not Specified)    PGM  (Not Specified)    PGF  (Not Specified)    Mother  Alive    Father  Alive        SOCIAL HISTORY      reports that she quit smoking about 3 years ago. She has never used smokeless tobacco. She reports current alcohol use. She reports that she does not use drugs. REVIEW OF SYSTEMS    (2-9 systems for level 4, 10 or more for level 5)     Review of Systems   Constitutional: Negative for chills, fever and unexpected weight change. HENT: Negative for congestion, rhinorrhea, sinus pressure and sore throat. Respiratory: Negative for cough, shortness of breath and wheezing. Cardiovascular: Negative for chest pain and palpitations. Gastrointestinal: Negative for abdominal pain, constipation, diarrhea, nausea and vomiting. Genitourinary: Negative for dysuria and hematuria. Musculoskeletal: Negative for arthralgias and myalgias. Skin: Negative for color change and rash. Neurological: Negative for dizziness, weakness and headaches. Hematological: Negative for adenopathy. All other systems reviewed and are negative.     Except as noted above the remainder of the review of systems was reviewed and negative. PHYSICAL EXAM    (up to 7 for level 4, 8 or more for level 5)     ED Triage Vitals [10/25/21 1139]   BP Temp Temp Source Pulse Resp SpO2 Height Weight   (!) 150/116 98.1 °F (36.7 °C) Oral 97 18 100 % 5' 10\" (1.778 m) 210 lb 14.4 oz (95.7 kg)       Physical Exam  Vitals reviewed. Constitutional:       Appearance: She is well-developed. HENT:      Head: Normocephalic and atraumatic. Eyes:      Conjunctiva/sclera: Conjunctivae normal.      Pupils: Pupils are equal, round, and reactive to light. Cardiovascular:      Rate and Rhythm: Normal rate and regular rhythm. Pulmonary:      Effort: Pulmonary effort is normal. No respiratory distress. Breath sounds: Normal breath sounds. No stridor. Abdominal:      General: Bowel sounds are normal.      Palpations: Abdomen is soft. Tenderness: There is abdominal tenderness. Musculoskeletal:         General: Normal range of motion. Cervical back: Normal range of motion and neck supple. Lymphadenopathy:      Cervical: No cervical adenopathy. Skin:     General: Skin is warm and dry. Findings: No rash. Neurological:      Mental Status: She is alert and oriented to person, place, and time. DIAGNOSTIC RESULTS     RADIOLOGY:   Non-plain film images such as CT, Ultrasound and MRI are read by the radiologist. [facility] radiographic images are visualized and preliminarily interpreted by the emergency physician with the below findings:    CT ABDOMEN PELVIS WO CONTRAST Additional Contrast? None    Result Date: 10/25/2021  EXAMINATION: CT OF THE ABDOMEN AND PELVIS WITHOUT CONTRAST 10/25/2021 12:21 pm TECHNIQUE: CT of the abdomen and pelvis was performed without the administration of intravenous contrast. Multiplanar reformatted images are provided for review.  Dose modulation, iterative reconstruction, and/or weight based adjustment of the mA/kV was utilized to reduce the radiation which appears to have slightly migrated caudally within the lower   uterine segment. Additionally is 4.6 cm left adnexal cyst.  Ultrasound could   be beneficial to evaluate IUD positioning and left adnexal cyst if this may   change patient management. Fatty infiltration of the liver      Colonic diverticulosis. LABS:  Labs Reviewed   CBC WITH AUTO DIFFERENTIAL - Abnormal; Notable for the following components:       Result Value    RDW 14.6 (*)     Lymphocytes 45 (*)     All other components within normal limits   BASIC METABOLIC PANEL - Abnormal; Notable for the following components:    Glucose 134 (*)     CREATININE <0.40 (*)     Calcium 8.1 (*)     CO2 19 (*)     All other components within normal limits   HEPATIC FUNCTION PANEL - Abnormal; Notable for the following components:    ALT 47 (*)      (*)     All other components within normal limits   LIPASE       All other labs were within normal range or not returned as of this dictation. EMERGENCY DEPARTMENT COURSE and DIFFERENTIAL DIAGNOSIS/MDM:   Vitals:    Vitals:    10/25/21 1139   BP: (!) 150/116   Pulse: 97   Resp: 18   Temp: 98.1 °F (36.7 °C)   TempSrc: Oral   SpO2: 100%   Weight: 95.7 kg (210 lb 14.4 oz)   Height: 5' 10\" (1.778 m)       Medical Decision Making:pt has no pelvic tenderness the pain in the right mid abdomen. She stable and able  to be discharged home. Follow-up with her primary care physician. FINAL IMPRESSION      1.  Abdominal pain, unspecified abdominal location          DISPOSITION/PLAN   DISPOSITION Decision To Discharge 10/25/2021 01:25:10 PM      PATIENT REFERRED TO:   Ana María Tidwell, [address]  [address] [place] [address]  [phone]    Schedule an appointment as soon as possible for a visit       [facility] ED  [address] [facility]  [phone]    If symptoms worsen      DISCHARGE MEDICATIONS:     Discharge Medication List as of 10/25/2021  1:26 PM START taking these medications    Details   HYDROcodone-acetaminophen (NORCO) 5-325 MG per tablet Take 1 tablet by mouth every 6 hours as needed for Pain for up to 3 days. , Disp-12 tablet, R-0Print      ondansetron (ZOFRAN ODT) 4 MG disintegrating tablet Take 1 tablet by mouth every 8 hours as needed for Nausea, Disp-20 tablet, R-0Print                 (Please note that portions of this note were completed with a voice recognition program.  Efforts were made to edit the dictations but occasionally words are mis-transcribed.)    [address] [place] [address] NP, APRN - CNP  Certified Nurse Practitioner        Landy Flores, TEJAS - SARA  10/26/21 9849

## 2021-11-17 ENCOUNTER — HOSPITAL ENCOUNTER (OUTPATIENT)
Age: 41
Setting detail: SPECIMEN
Discharge: HOME | End: 2021-11-17

## 2021-11-17 LAB
ABSOLUTE EOS #: 0.05 K/UL (ref 0–0.44)
ABSOLUTE IMMATURE GRANULOCYTE: <0.03 K/UL (ref 0–0.3)
ABSOLUTE LYMPH #: 2.04 K/UL (ref 1.1–3.7)
ABSOLUTE MONO #: 0.38 K/UL (ref 0.1–1.2)
ALT SERPL-CCNC: 30 U/L (ref 5–33)
BASOPHILS # BLD: 1 % (ref 0–2)
BASOPHILS ABSOLUTE: 0.04 K/UL (ref 0–0.2)
CREAT SERPL-MCNC: 0.48 MG/DL (ref 0.5–0.9)
DIFFERENTIAL TYPE: ABNORMAL
EOSINOPHILS RELATIVE PERCENT: 1 % (ref 1–4)
GFR AFRICAN AMERICAN: >60 ML/MIN
GFR NON-AFRICAN AMERICAN: >60 ML/MIN
GFR SERPL CREATININE-BSD FRML MDRD: ABNORMAL ML/MIN/{1.73_M2}
GFR SERPL CREATININE-BSD FRML MDRD: ABNORMAL ML/MIN/{1.73_M2}
HCT VFR BLD CALC: 42.5 % (ref 36.3–47.1)
HEMOGLOBIN: 14.4 G/DL (ref 11.9–15.1)
IMMATURE GRANULOCYTES: 0 %
LYMPHOCYTES # BLD: 43 % (ref 24–43)
MCH RBC QN AUTO: 34.4 PG (ref 25.2–33.5)
MCHC RBC AUTO-ENTMCNC: 33.9 G/DL (ref 28.4–34.8)
MCV RBC AUTO: 101.4 FL (ref 82.6–102.9)
MONOCYTES # BLD: 8 % (ref 3–12)
NRBC AUTOMATED: 0 PER 100 WBC
PDW BLD-RTO: 14.6 % (ref 11.8–14.4)
PLATELET # BLD: 189 K/UL (ref 138–453)
PLATELET ESTIMATE: ABNORMAL
PMV BLD AUTO: 10 FL (ref 8.1–13.5)
RBC # BLD: 4.19 M/UL (ref 3.95–5.11)
RBC # BLD: ABNORMAL 10*6/UL
SEG NEUTROPHILS: 47 % (ref 36–65)
SEGMENTED NEUTROPHILS ABSOLUTE COUNT: 2.23 K/UL (ref 1.5–8.1)
WBC # BLD: 4.8 K/UL (ref 3.5–11.3)
WBC # BLD: ABNORMAL 10*3/UL

## 2022-02-10 ENCOUNTER — OFFICE VISIT (OUTPATIENT)
Dept: FAMILY MEDICINE CLINIC | Age: 42
End: 2022-02-10
Payer: COMMERCIAL

## 2022-02-10 ENCOUNTER — HOSPITAL ENCOUNTER (OUTPATIENT)
Age: 42
Setting detail: SPECIMEN
Discharge: HOME | End: 2022-02-10

## 2022-02-10 VITALS
TEMPERATURE: 97.3 F | BODY MASS INDEX: 31.41 KG/M2 | HEART RATE: 99 BPM | SYSTOLIC BLOOD PRESSURE: 156 MMHG | HEIGHT: 70 IN | DIASTOLIC BLOOD PRESSURE: 104 MMHG | WEIGHT: 219.4 LBS

## 2022-02-10 DIAGNOSIS — I10 ESSENTIAL HYPERTENSION: ICD-10-CM

## 2022-02-10 DIAGNOSIS — R10.13 EPIGASTRIC PAIN: Primary | ICD-10-CM

## 2022-02-10 DIAGNOSIS — Z91.89 ENCOUNTER FOR HCV SCREENING TEST FOR HIGH RISK PATIENT: ICD-10-CM

## 2022-02-10 DIAGNOSIS — M1A.9XX1 GOUT TOPHI: ICD-10-CM

## 2022-02-10 DIAGNOSIS — R10.13 EPIGASTRIC PAIN: ICD-10-CM

## 2022-02-10 DIAGNOSIS — E78.5 HYPERLIPIDEMIA, UNSPECIFIED HYPERLIPIDEMIA TYPE: ICD-10-CM

## 2022-02-10 DIAGNOSIS — Z11.59 ENCOUNTER FOR HCV SCREENING TEST FOR HIGH RISK PATIENT: ICD-10-CM

## 2022-02-10 PROBLEM — K21.9 GASTROESOPHAGEAL REFLUX DISEASE: Status: ACTIVE | Noted: 2022-02-10

## 2022-02-10 LAB
ABSOLUTE EOS #: 0.05 K/UL (ref 0–0.44)
ABSOLUTE IMMATURE GRANULOCYTE: 0.04 K/UL (ref 0–0.3)
ABSOLUTE LYMPH #: 1.89 K/UL (ref 1.1–3.7)
ABSOLUTE MONO #: 0.37 K/UL (ref 0.1–1.2)
ALT SERPL-CCNC: 43 U/L (ref 5–33)
BASOPHILS # BLD: 1 % (ref 0–2)
BASOPHILS ABSOLUTE: 0.05 K/UL (ref 0–0.2)
CHOLESTEROL/HDL RATIO: 6
CHOLESTEROL: 235 MG/DL
CREAT SERPL-MCNC: 0.47 MG/DL (ref 0.5–0.9)
DIFFERENTIAL TYPE: ABNORMAL
EOSINOPHILS RELATIVE PERCENT: 1 % (ref 1–4)
GFR AFRICAN AMERICAN: >60 ML/MIN
GFR NON-AFRICAN AMERICAN: >60 ML/MIN
GFR SERPL CREATININE-BSD FRML MDRD: ABNORMAL ML/MIN/{1.73_M2}
GFR SERPL CREATININE-BSD FRML MDRD: ABNORMAL ML/MIN/{1.73_M2}
HCT VFR BLD CALC: 37.9 % (ref 36.3–47.1)
HDLC SERPL-MCNC: 39 MG/DL
HEMOGLOBIN: 12.9 G/DL (ref 11.9–15.1)
HEPATITIS C ANTIBODY: NONREACTIVE
IMMATURE GRANULOCYTES: 1 %
LDL CHOLESTEROL DIRECT: 36 MG/DL
LDL CHOLESTEROL: ABNORMAL MG/DL (ref 0–130)
LYMPHOCYTES # BLD: 32 % (ref 24–43)
MCH RBC QN AUTO: 35.7 PG (ref 25.2–33.5)
MCHC RBC AUTO-ENTMCNC: 34 G/DL (ref 28.4–34.8)
MCV RBC AUTO: 105 FL (ref 82.6–102.9)
MONOCYTES # BLD: 6 % (ref 3–12)
NRBC AUTOMATED: 0 PER 100 WBC
PDW BLD-RTO: 14.5 % (ref 11.8–14.4)
PLATELET # BLD: 184 K/UL (ref 138–453)
PLATELET ESTIMATE: ABNORMAL
PMV BLD AUTO: 9.7 FL (ref 8.1–13.5)
RBC # BLD: 3.61 M/UL (ref 3.95–5.11)
RBC # BLD: ABNORMAL 10*6/UL
SEG NEUTROPHILS: 59 % (ref 36–65)
SEGMENTED NEUTROPHILS ABSOLUTE COUNT: 3.47 K/UL (ref 1.5–8.1)
TRIGL SERPL-MCNC: 1312 MG/DL
VLDLC SERPL CALC-MCNC: ABNORMAL MG/DL (ref 1–30)
WBC # BLD: 5.9 K/UL (ref 3.5–11.3)
WBC # BLD: ABNORMAL 10*3/UL

## 2022-02-10 PROCEDURE — 99213 OFFICE O/P EST LOW 20 MIN: CPT | Performed by: STUDENT IN AN ORGANIZED HEALTH CARE EDUCATION/TRAINING PROGRAM

## 2022-02-10 PROCEDURE — G8427 DOCREV CUR MEDS BY ELIG CLIN: HCPCS | Performed by: STUDENT IN AN ORGANIZED HEALTH CARE EDUCATION/TRAINING PROGRAM

## 2022-02-10 PROCEDURE — 1036F TOBACCO NON-USER: CPT | Performed by: STUDENT IN AN ORGANIZED HEALTH CARE EDUCATION/TRAINING PROGRAM

## 2022-02-10 PROCEDURE — G8417 CALC BMI ABV UP PARAM F/U: HCPCS | Performed by: STUDENT IN AN ORGANIZED HEALTH CARE EDUCATION/TRAINING PROGRAM

## 2022-02-10 PROCEDURE — G8484 FLU IMMUNIZE NO ADMIN: HCPCS | Performed by: STUDENT IN AN ORGANIZED HEALTH CARE EDUCATION/TRAINING PROGRAM

## 2022-02-10 RX ORDER — ATORVASTATIN CALCIUM 40 MG/1
TABLET, FILM COATED ORAL
Qty: 90 TABLET | Refills: 1 | Status: SHIPPED | OUTPATIENT
Start: 2022-02-10 | End: 2022-09-06

## 2022-02-10 RX ORDER — SUCRALFATE 1 G/1
1 TABLET ORAL 4 TIMES DAILY
Qty: 120 TABLET | Refills: 0 | Status: SHIPPED | OUTPATIENT
Start: 2022-02-10 | End: 2022-03-09

## 2022-02-10 RX ORDER — SUCRALFATE 1 G/1
TABLET ORAL
COMMUNITY
Start: 2022-01-28 | End: 2022-02-10 | Stop reason: SDUPTHER

## 2022-02-10 RX ORDER — ALLOPURINOL 300 MG/1
300 TABLET ORAL DAILY
Qty: 30 TABLET | Refills: 2 | Status: SHIPPED | OUTPATIENT
Start: 2022-02-10 | End: 2022-04-15

## 2022-02-10 RX ORDER — AMLODIPINE BESYLATE 2.5 MG/1
2.5 TABLET ORAL DAILY
Qty: 90 TABLET | Refills: 1 | Status: SHIPPED | OUTPATIENT
Start: 2022-02-10 | End: 2022-08-08

## 2022-02-10 RX ORDER — OMEPRAZOLE 40 MG/1
CAPSULE, DELAYED RELEASE ORAL
Qty: 30 CAPSULE | Refills: 2 | Status: SHIPPED | OUTPATIENT
Start: 2022-02-10 | End: 2022-02-17

## 2022-02-10 ASSESSMENT — ENCOUNTER SYMPTOMS
ABDOMINAL PAIN: 1
VOMITING: 0
COUGH: 0
DIARRHEA: 0
CONSTIPATION: 0
SHORTNESS OF BREATH: 0
NAUSEA: 0
WHEEZING: 0
ABDOMINAL DISTENTION: 0

## 2022-02-10 NOTE — PROGRESS NOTES
Visit Information    Have you changed or started any medications since your last visit including any over-the-counter medicines, vitamins, or herbal medicines? no   Have you stopped taking any of your medications? Is so, why? -  no  Are you having any side effects from any of your medications? - no    Have you seen any other physician or provider since your last visit? yes [facility]   Have you had any other diagnostic tests since your last visit? yes - Labs, CT   Have you been seen in the emergency room and/or had an admission in a hospital since we last saw you?  yes - [facility], [address] [facility]   Have you had your routine dental cleaning in the past 6 months?  no     Do you have an active MyChart account? If no, what is the barrier?   Yes    Patient Care Team:  Amy Leahy MD as PCP - General (Emergency Medicine)  León Castellanos MD as PCP - [facility]    Medical History Review  Past Medical, Family, and Social History reviewed and does not contribute to the patient presenting condition    Health Maintenance   Topic Date Due    Hepatitis C screen  Never done    Pneumococcal 0-64 years Vaccine (1 of 2 - PPSV23) Never done    Diabetic retinal exam  Never done    Hepatitis B vaccine (1 of 3 - Risk 3-dose series) Never done    Cervical cancer screen  04/26/2021    Flu vaccine (1) 09/01/2021    Diabetic foot exam  10/07/2021    Diabetic microalbuminuria test  10/20/2021    Lipid screen  10/20/2021    COVID-19 Vaccine (2 - Pfizer 3-dose series) 11/13/2021    A1C test (Diabetic or Prediabetic)  07/16/2022    Depression Screen  07/16/2022    Potassium monitoring  10/25/2022    Creatinine monitoring  11/17/2022    DTaP/Tdap/Td vaccine (2 - Td or Tdap) 03/23/2027    HIV screen  Completed    Hepatitis A vaccine  Aged Out    Hib vaccine  Aged Out    Meningococcal (ACWY) vaccine  Aged Out    Varicella vaccine  Discontinued

## 2022-02-10 NOTE — PROGRESS NOTES
Grandmother     Other Maternal Grandmother         aneurysm    High Blood Pressure Maternal Grandfather     High Blood Pressure Paternal Grandmother     High Blood Pressure Paternal Grandfather        Objective:    BP (!) 156/104 (Site: Right Upper Arm, Position: Sitting, Cuff Size: Medium Adult)   Pulse 99   Temp 97.3 °F (36.3 °C) (Temporal)   Ht 5' 10\" (1.778 m)   Wt 219 lb 6.4 oz (99.5 kg)   BMI 31.48 kg/m²    BP Readings from Last 3 Encounters:   02/10/22 (!) 156/104   10/25/21 (!) 150/116   07/16/21 130/82       Physical Exam  Vitals and nursing note reviewed. Constitutional:       General: She is not in acute distress. Appearance: Normal appearance. HENT:      Head: Normocephalic and atraumatic. Cardiovascular:      Rate and Rhythm: Normal rate and regular rhythm. Pulses: Normal pulses. Heart sounds: Normal heart sounds. No murmur heard. Pulmonary:      Effort: Pulmonary effort is normal.      Breath sounds: Normal breath sounds. Abdominal:      General: Abdomen is flat. Bowel sounds are normal.      Palpations: Abdomen is soft. Tenderness: There is abdominal tenderness (Epigastric). There is no guarding. Musculoskeletal:         General: No swelling or tenderness. Normal range of motion. Skin:     General: Skin is warm and dry. Capillary Refill: Capillary refill takes less than 2 seconds. Coloration: Skin is not jaundiced or pale. Findings: No erythema. Neurological:      General: No focal deficit present. Mental Status: She is alert and oriented to person, place, and time. Psychiatric:         Mood and Affect: Mood normal.           Assessment and Plan:    1. Gout tophi  Well-controlled  - allopurinol (ZYLOPRIM) 300 MG tablet; Take 1 tablet by mouth daily  Dispense: 30 tablet; Refill: 2    2.  Hyperlipidemia, unspecified hyperlipidemia type  - atorvastatin (LIPITOR) 40 MG tablet; take 1 tablet by mouth once daily  Dispense: 90 tablet; Sig: take 1 tablet by mouth once daily    omeprazole (PRILOSEC) 40 MG delayed release capsule 30 capsule 2     Sig: Take 1 capsule by mouth once daily    amLODIPine (NORVASC) 2.5 MG tablet 90 tablet 1     Sig: Take 1 tablet by mouth daily    sucralfate (CARAFATE) 1 GM tablet 120 tablet 0     Sig: Take 1 tablet by mouth 4 times daily       Medications Discontinued During This Encounter   Medication Reason    celecoxib (CELEBREX) 200 MG capsule Therapy completed    omeprazole (PRILOSEC) 20 MG delayed release capsule REORDER    atorvastatin (LIPITOR) 40 MG tablet REORDER    allopurinol (ZYLOPRIM) 100 MG tablet REORDER    sucralfate (CARAFATE) 1 GM tablet REORDER       Jo received counseling on the following healthy behaviors: nutrition, exercise and medication adherence. Discussed use, benefit, and side effects of prescribed medications. Barriers to medication compliance addressed. All patient questions answered. Pt voiced understanding. Return in about 4 weeks (around 3/10/2022). Disclaimer: Some orall of this note was transcribed using voice-recognition software. This may cause typographical errors occasionally. Although all effort is made to fix these errors, please do not hesitate to contact our office if there Joe Thompson concern with the understanding of this note.

## 2022-02-10 NOTE — PATIENT INSTRUCTIONS
Thank you for letting us take care of you today. We hope all your questions were addressed. If a question was overlooked or something else comes to mind after you return home, please contact a member of your Care Team listed below. Your Care Team at [place] [address] is Team #3  Jessica Moore MD (Faculty)  Alexus Muñoz MD (Faculty  Paralanika Kee MD (Resident)  Juan Manuel Paredes (Resident)   Migue Ramachandran MD (Resident)  Carlos Cee MD (Resident)  Ana Ospina., JOSE Carver., JOSE Scott., Oswald Deluca., Netta Manning ([facility])  Sergey Bradshaw (Clinical Practice Manager)  Jeremias Joseph [facility] (Clinical Pharmacist)     Office phone number: [phone]    If you need to get in right away due to illness, please be advised we have \"Same Day\" appointments available Monday-Friday. Please call us at [phone] option #3 to schedule your \"Same Day\" appointment.

## 2022-02-11 ENCOUNTER — HOSPITAL ENCOUNTER (OUTPATIENT)
Age: 42
Discharge: HOME | End: 2022-02-11
Payer: COMMERCIAL

## 2022-02-11 DIAGNOSIS — R10.13 EPIGASTRIC PAIN: ICD-10-CM

## 2022-02-11 PROCEDURE — 83013 H PYLORI (C-13) BREATH: CPT

## 2022-02-11 PROCEDURE — 83014 H PYLORI DRUG ADMIN: CPT

## 2022-02-11 RX ORDER — FENOFIBRATE 48 MG/1
48 TABLET, COATED ORAL DAILY
Qty: 30 TABLET | Refills: 2 | Status: CANCELLED | OUTPATIENT
Start: 2022-02-11

## 2022-02-12 LAB — H PYLORI BREATH TEST: POSITIVE

## 2022-02-17 DIAGNOSIS — K29.70 HELICOBACTER PYLORI GASTRITIS: Primary | ICD-10-CM

## 2022-02-17 DIAGNOSIS — B96.81 HELICOBACTER PYLORI GASTRITIS: Primary | ICD-10-CM

## 2022-02-17 DIAGNOSIS — E78.1 HYPERTRIGLYCERIDEMIA: ICD-10-CM

## 2022-02-17 RX ORDER — CLARITHROMYCIN 500 MG/1
500 TABLET, COATED ORAL 2 TIMES DAILY
Qty: 28 TABLET | Refills: 0 | Status: SHIPPED | OUTPATIENT
Start: 2022-02-17 | End: 2022-03-03

## 2022-02-17 RX ORDER — METRONIDAZOLE 500 MG/1
500 TABLET ORAL 3 TIMES DAILY
Qty: 42 TABLET | Refills: 0 | Status: SHIPPED | OUTPATIENT
Start: 2022-02-17 | End: 2022-03-03

## 2022-02-17 RX ORDER — OMEPRAZOLE 40 MG/1
40 CAPSULE, DELAYED RELEASE ORAL
Qty: 14 CAPSULE | Refills: 0 | Status: SHIPPED | OUTPATIENT
Start: 2022-02-17 | End: 2022-05-09

## 2022-02-17 RX ORDER — FENOFIBRATE 48 MG/1
48 TABLET, COATED ORAL DAILY
Qty: 60 TABLET | Refills: 1 | Status: SHIPPED | OUTPATIENT
Start: 2022-02-17 | End: 2022-02-24 | Stop reason: ALTCHOICE

## 2022-02-17 NOTE — PROGRESS NOTES
H. pylori gastritis+  Hypertriglyceridemia+ 1300    Started on triple regimen for H. pylori infection and fenofibrate for hypertriglyceridemia. Educated patient on how to use it and the need for retesting to see eradication of H. pylori. Patient voiced understanding.

## 2022-02-23 ENCOUNTER — TELEPHONE (OUTPATIENT)
Dept: FAMILY MEDICINE CLINIC | Age: 42
End: 2022-02-23

## 2022-02-23 RX ORDER — SODIUM CHLORIDE 9 MG/ML
INJECTION, SOLUTION INTRAVENOUS CONTINUOUS
Status: CANCELLED | OUTPATIENT
Start: 2022-02-23

## 2022-02-23 NOTE — TELEPHONE ENCOUNTER
Dr. Juan Antonio Antonio office called because they state the patient has been seeing them for years for Gout and OA of Knees and Feet. They need PCP to complete a prior authorization before they patient can continue to be see at their office. Patients next appointment is 3/4/2022. Please advise.

## 2022-02-24 ENCOUNTER — HOSPITAL ENCOUNTER (OUTPATIENT)
Dept: ULTRASOUND IMAGING | Age: 42
Discharge: HOME | End: 2022-02-26
Payer: COMMERCIAL

## 2022-02-24 VITALS
SYSTOLIC BLOOD PRESSURE: 145 MMHG | OXYGEN SATURATION: 96 % | RESPIRATION RATE: 19 BRPM | TEMPERATURE: 97.3 F | HEIGHT: 70 IN | BODY MASS INDEX: 31.12 KG/M2 | DIASTOLIC BLOOD PRESSURE: 108 MMHG | HEART RATE: 104 BPM | WEIGHT: 217.4 LBS

## 2022-02-24 DIAGNOSIS — R94.5 ABNORMAL LIVER FUNCTION: ICD-10-CM

## 2022-02-24 LAB
INR BLD: 1.3
PLATELET # BLD: 183 K/UL (ref 138–453)
PROTHROMBIN TIME: 16.2 SEC (ref 11.5–14.2)

## 2022-02-24 PROCEDURE — 85610 PROTHROMBIN TIME: CPT

## 2022-02-24 PROCEDURE — 7100000011 HC PHASE II RECOVERY - ADDTL 15 MIN

## 2022-02-24 PROCEDURE — 88313 SPECIAL STAINS GROUP 2: CPT

## 2022-02-24 PROCEDURE — 85049 AUTOMATED PLATELET COUNT: CPT

## 2022-02-24 PROCEDURE — 88307 TISSUE EXAM BY PATHOLOGIST: CPT

## 2022-02-24 PROCEDURE — 47000 NEEDLE BIOPSY OF LIVER PERQ: CPT

## 2022-02-24 PROCEDURE — 2500000003 HC RX 250 WO HCPCS: Performed by: RADIOLOGY

## 2022-02-24 PROCEDURE — 6360000002 HC RX W HCPCS: Performed by: RADIOLOGY

## 2022-02-24 PROCEDURE — 7100000010 HC PHASE II RECOVERY - FIRST 15 MIN

## 2022-02-24 RX ORDER — SODIUM CHLORIDE 9 MG/ML
INJECTION, SOLUTION INTRAVENOUS CONTINUOUS
Status: DISCONTINUED | OUTPATIENT
Start: 2022-02-24 | End: 2022-02-27 | Stop reason: HOSPADM

## 2022-02-24 RX ORDER — ACETAMINOPHEN 325 MG/1
650 TABLET ORAL EVERY 4 HOURS PRN
Status: DISCONTINUED | OUTPATIENT
Start: 2022-02-24 | End: 2022-02-27 | Stop reason: HOSPADM

## 2022-02-24 RX ORDER — FENTANYL CITRATE 50 UG/ML
INJECTION, SOLUTION INTRAMUSCULAR; INTRAVENOUS
Status: COMPLETED | OUTPATIENT
Start: 2022-02-24 | End: 2022-02-24

## 2022-02-24 RX ORDER — MIDAZOLAM HYDROCHLORIDE 1 MG/ML
INJECTION INTRAMUSCULAR; INTRAVENOUS
Status: COMPLETED | OUTPATIENT
Start: 2022-02-24 | End: 2022-02-24

## 2022-02-24 RX ORDER — LIDOCAINE HYDROCHLORIDE 10 MG/ML
INJECTION, SOLUTION EPIDURAL; INFILTRATION; INTRACAUDAL; PERINEURAL
Status: COMPLETED | OUTPATIENT
Start: 2022-02-24 | End: 2022-02-24

## 2022-02-24 RX ADMIN — Medication 50 MCG: at 13:35

## 2022-02-24 RX ADMIN — MIDAZOLAM 1 MG: 1 INJECTION INTRAMUSCULAR; INTRAVENOUS at 13:35

## 2022-02-24 RX ADMIN — LIDOCAINE HYDROCHLORIDE 5 ML: 10 INJECTION, SOLUTION EPIDURAL; INFILTRATION; INTRACAUDAL; PERINEURAL at 13:38

## 2022-02-24 ASSESSMENT — PAIN DESCRIPTION - DESCRIPTORS: DESCRIPTORS: SHARP;PRESSURE;TIGHTNESS

## 2022-02-24 ASSESSMENT — PAIN SCALES - GENERAL
PAINLEVEL_OUTOF10: 0

## 2022-02-24 ASSESSMENT — PAIN - FUNCTIONAL ASSESSMENT: PAIN_FUNCTIONAL_ASSESSMENT: 0-10

## 2022-02-24 NOTE — H&P
Interval H&P Note    Pt Name: Willian Rodriguez  MRN: [id number]  YOB: 1980  Date of evaluation: 2/24/2022      [x] I have reviewed THE OFFICE PROGRESS NOTE OF 02/10/2022 BY  ZARINA [address] [place] [address] Street [facility]   for an Interval History and Physical note. AND IS COPIED BELOW. [x] I have examined  Willian Rodriguez  There are no changes to the patient who is scheduled for a LIVER BIOPSY FOR EVALUATION OF LIVER ENZYMES AND POSSIBLE FIBROSIS OR CIRRHOSIS BY  IN IR. The patient denies new health changes, fever, chills, wheezing, cough, increased SOB, chest pain, open sores or wounds. SHE DOES NOT TAKE BLOOD THINNERS, SHE DOES NOT HAVE DIABETES. Vital signs: BP (!) 140/94   Pulse 101   Temp 96.9 °F (36.1 °C) (Temporal)   Resp 18   SpO2 98%     Allergies:  Iodine and Shellfish-derived products    Medications:    Prior to Admission medications    Medication Sig Start Date End Date Taking?  Authorizing Provider   omeprazole (PRILOSEC) 40 MG delayed release capsule Take 1 capsule by mouth every morning (before breakfast) for 14 days 2/17/22 3/3/22  Daphney Johnson MD   clarithromycin (BIAXIN) 500 MG tablet Take 1 tablet by mouth 2 times daily for 14 days 2/17/22 3/3/22  Daphney Johnson MD   metroNIDAZOLE (FLAGYL) 500 MG tablet Take 1 tablet by mouth 3 times daily for 14 days 2/17/22 3/3/22  Daphney Johnson MD   fenofibrate (TRICOR) 48 MG tablet Take 1 tablet by mouth daily 2/17/22   Daphney Johnson MD   allopurinol (ZYLOPRIM) 300 MG tablet Take 1 tablet by mouth daily 2/10/22   Daphney Johnson MD   atorvastatin (LIPITOR) 40 MG tablet take 1 tablet by mouth once daily 2/10/22   Daphney Johnson MD   amLODIPine (NORVASC) 2.5 MG tablet Take 1 tablet by mouth daily 2/10/22   Daphney Johnson MD   sucralfate (CARAFATE) 1 GM tablet Take 1 tablet by mouth 4 times daily 2/10/22   Daphney Johnson MD   ondansetron (ZOFRAN ODT) 4 MG disintegrating tablet Take 1 tablet by mouth every 8 hours as needed for Nausea 10/25/21   Desiree Galla Evelyn Navarrete CNP         This is a 43 y.o. female who is pleasant, cooperative, alert and oriented x3, in no acute distress    Physical Exam:  Lungs: Bilateral equal air entry, unlabored, clear to ausculation, no wheezing, rales or rhonchi, normal effort  Cardiovascular: HR  normal rate, regular rhythm, no murmur, gallop, rub. Abdomen: Soft, nontender, nondistended, normal bowel sounds. HAS RIGHT UPPER QUADRANT TENDERNESS AND LIVER ENLARGEMENT. PEDAL PULSES + 2 BILATERALLY , NO CALF TENDERNESS NO EDEMA  Labs:  Recent Labs     02/10/22  1544   HGB 12.9   HCT 37.9   WBC 5.9   .0*      CREATININE 0.47*   ALT 43*       No results for input(s): COVID19 in the last 720 hours. TEJAS Mcarthur CNP   Electronically signed 2/24/2022 at 11:52 AM       Resident   Specialty:  Emergency Medicine   Progress Notes      Attested   Encounter Date:  2/10/2022         Related encounter: Office Visit from 2/10/2022 in [facility]           Attested        [address] signed by Monster Blakely DO at 2/14/2022  4:32 PM   Attending Physician Statement         Wt Readings from Last 3 Encounters:   02/10/22 219 lb 6.4 oz (99.5 kg)   10/25/21 210 lb 14.4 oz (95.7 kg)   07/16/21 210 lb (95.3 kg)          Temp Readings from Last 3 Encounters:   02/10/22 97.3 °F (36.3 °C) (Temporal)   10/25/21 98.1 °F (36.7 °C) (Oral)   07/16/21 97.1 °F (36.2 °C)          BP Readings from Last 3 Encounters:   02/10/22 (!) 156/104   10/25/21 (!) 150/116   07/16/21 130/82          Pulse Readings from Last 3 Encounters:   02/10/22 99   10/25/21 97   07/16/21 81            I have discussed the care of Vernadine Bidding, including pertinent history and exam findings with the resident. I have reviewed the key elements of all parts of the encounter with the resident. I agree with the assessment, plan and orders as documented by the resident.   (GE Modifier)       Expand All Collapse All          Show:Clear all  [x]Manual[x]Template[]Copied    Added by:  Jodi Pisano MD      []Isaiah for details    Subjective:    Anselmo Walls is a 39 y.o. female with  has a past medical history of Anxiety, Arthritis, Class 2 obesity due to excess calories with body mass index (BMI) of 35.0 to 35.9 in adult, Depression, Diabetes mellitus ([address] Gastroesophageal reflux disease, H/O tubal ligation, Headache, High cholesterol, and Osteoarthritis. Presented to the office today for:       Chief Complaint   Patient presents with   Delia Penny Doctor       Former patient of Dr. Magnolia Leonardo Other       Stomach issues - had a hernia mesh put in and has been having issues since - Patient see's GI         HPI  Liver biopsy? sched  Colonoscopy later     Abdominal Pain: Patient complains of abdominal pain. The pain is described as sharp, and is 7/10 in intensity. Pain is located in the epigastric with radiation to the back. Onset was 10 months ago. She had umbilical hernia which was repaired in 12/2020, states her symptoms started after that. Symptoms have been gradually worsening since. Aggravating factors: eating. Alleviating factors: none. Associated symptoms: none. The patient denies none. Recent testing including lipase, CT abdomen pelvis did not reveal anything. She had elevated liver function enzymes. She is undergoing liver biopsy soon. She does not drink much, does not smoke much. Review of Systems   Constitutional: Negative for chills and fever. HENT: Negative. Respiratory: Negative for cough, shortness of breath and wheezing. Cardiovascular: Negative for chest pain, palpitations and leg swelling. Gastrointestinal: Positive for abdominal pain. Negative for abdominal distention, constipation, diarrhea, nausea and vomiting. Genitourinary: Negative. Musculoskeletal: Negative. Skin: Negative. Neurological: Negative. Psychiatric/Behavioral: Negative.              The patient has a   Family History         Family History   Problem Relation Age of Onset    Cancer Brother           kidney    High Blood Pressure Maternal Grandmother      Other Maternal Grandmother           aneurysm    High Blood Pressure Maternal Grandfather      High Blood Pressure Paternal Grandmother      High Blood Pressure Paternal Grandfather              Objective:    BP (!) 156/104 (Site: Right Upper Arm, Position: Sitting, Cuff Size: Medium Adult)   Pulse 99   Temp 97.3 °F (36.3 °C) (Temporal)   Ht 5' 10\" (1.778 m)   Wt 219 lb 6.4 oz (99.5 kg)   BMI 31.48 kg/m²        BP Readings from Last 3 Encounters:   02/10/22 (!) 156/104   10/25/21 (!) 150/116   07/16/21 130/82         Physical Exam  Vitals and nursing note reviewed. Constitutional:       General: She is not in acute distress. Appearance: Normal appearance. HENT:      Head: Normocephalic and atraumatic. Cardiovascular:      Rate and Rhythm: Normal rate and regular rhythm. Pulses: Normal pulses. Heart sounds: Normal heart sounds. No murmur heard. Pulmonary:      Effort: Pulmonary effort is normal.      Breath sounds: Normal breath sounds. Abdominal:      General: Abdomen is flat. Bowel sounds are normal.      Palpations: Abdomen is soft. Tenderness: There is abdominal tenderness (Epigastric). There is no guarding. Musculoskeletal:         General: No swelling or tenderness. Normal range of motion. Skin:     General: Skin is warm and dry. Capillary Refill: Capillary refill takes less than 2 seconds. Coloration: Skin is not jaundiced or pale. Findings: No erythema. Neurological:      General: No focal deficit present. Mental Status: She is alert and oriented to person, place, and time. Psychiatric:         Mood and Affect: Mood normal.               Assessment and Plan:    1. Gout tophi  Well-controlled  - allopurinol (ZYLOPRIM) 300 MG tablet; Take 1 tablet by mouth daily  Dispense: 30 tablet;  Refill: 2 2. Hyperlipidemia, unspecified hyperlipidemia type  - atorvastatin (LIPITOR) 40 MG tablet; take 1 tablet by mouth once daily  Dispense: 90 tablet; Refill: 1  - Lipid Panel; Future     3. Gastroesophageal reflux disease  Will increase omeprazole from 20mg to 40 mg daily     - omeprazole (PRILOSEC) 40 MG delayed release capsule; Take 1 capsule by mouth once daily  Dispense: 30 capsule; Refill: 2     4. Epigastric pain  Will check for H. pylori chronic gastritis. We will also check for hepatitis C antibody due to history of liver disease. Increased the dose of Prilosec and started her on Carafate.     - Hepatitis C Antibody; Future  - H. Pylori Breath Test; Future  - sucralfate (CARAFATE) 1 GM tablet; Take 1 tablet by mouth 4 times daily  Dispense: 120 tablet; Refill: 0     The 10-year ASCVD risk score (Gely Cee, et al., 2013) is: 6.1%    Values used to calculate the score:      Age: 39 years      Sex: Female      Is Non- : No      Diabetic: Yes      Tobacco smoker: No      Systolic Blood Pressure: 682 mmHg      Is BP treated: Yes      HDL Cholesterol: 39 mg/dL      Total Cholesterol: 235 mg/dL        5. Encounter for HCV screening test for high risk patient  HCV antibody     6. Essential hypertension  Elevated blood pressure. Was not taking any medication. We will start her on low-dose amlodipine and reevaluate in 1 month. - amLODIPine (NORVASC) 2.5 MG tablet; Take 1 tablet by mouth daily  Dispense: 90 tablet; Refill: 1           Patient was counseled on preventive measures and screenings offered today. Rationale of preventive and screening measures and consequences of delayed screening explained. Answered questions and patient continued to decline at this time. Will continue to address at follow up appointment.      Requested Prescriptions             Pending Prescriptions Disp Refills    fenofibrate (TRICOR) 48 MG tablet 30 tablet 2       Sig: Take 1 tablet by mouth daily Signed Prescriptions Disp Refills    allopurinol (ZYLOPRIM) 300 MG tablet 30 tablet 2       Sig: Take 1 tablet by mouth daily    atorvastatin (LIPITOR) 40 MG tablet 90 tablet 1       Sig: take 1 tablet by mouth once daily    omeprazole (PRILOSEC) 40 MG delayed release capsule 30 capsule 2       Sig: Take 1 capsule by mouth once daily    amLODIPine (NORVASC) 2.5 MG tablet 90 tablet 1       Sig: Take 1 tablet by mouth daily    sucralfate (CARAFATE) 1 GM tablet 120 tablet 0       Sig: Take 1 tablet by mouth 4 times daily              Medications Discontinued During This Encounter   Medication Reason    celecoxib (CELEBREX) 200 MG capsule Therapy completed    omeprazole (PRILOSEC) 20 MG delayed release capsule REORDER    atorvastatin (LIPITOR) 40 MG tablet REORDER    allopurinol (ZYLOPRIM) 100 MG tablet REORDER    sucralfate (CARAFATE) 1 GM tablet REORDER         Jo received counseling on the following healthy behaviors: nutrition, exercise and medication adherence. Discussed use, benefit, and side effects of prescribed medications. Barriers to medication compliance addressed. All patient questions answered. Pt voiced understanding. Return in about 4 weeks (around 3/10/2022). Disclaimer: Some orall of this note was transcribed using voice-recognition software. This may cause typographical errors occasionally. Although all effort is made to fix these errors, please do not hesitate to contact our office if there Naima Syed concern with the understanding of this note.                   Cosigned by: Nubia Deal DO at 2/14/2022  4:32 PM       Revision History

## 2022-02-24 NOTE — POST SEDATION
Sedation Post Procedure Note    Patient Name: Paula Chopra   YOB: 1980  Room/Bed: Room/bed info not found  Medical Record Number: [id number]  Date: 2/24/2022   Time: 1:47 PM         Physicians/Assistants: Char Goetz MD, MD    Procedure Performed:  US guided random core liver biopsy    Post-Sedation Vital Signs:  Vitals:    02/24/22 1340   BP: (!) 134/94   Pulse: 95   Resp: 20   Temp:    SpO2: 95%      Vital signs were reviewed and were stable after the procedure (see flow sheet for vitals)            Complications: none    Electronically signed by Char Goetz MD on 2/24/2022 at 1:47 PM

## 2022-02-24 NOTE — PRE SEDATION
Sedation Pre-Procedure Note    Patient Name: Jayleen Payan   YOB: 1980  Room/Bed: Room/bed info not found  Medical Record Number: [id number]  Date: 2/24/2022   Time: 1:46 PM       Indication:  Abnormal LFT    Consent: I have discussed with the patient and/or the patient representative the indication, alternatives, and the possible risks and/or complications of the planned procedure and the anesthesia methods. The patient and/or patient representative appear to understand and agree to proceed. Vital Signs:   Vitals:    02/24/22 1340   BP: (!) 134/94   Pulse: 95   Resp: 20   Temp:    SpO2: 95%       Past Medical History:   has a past medical history of Anxiety, Arthritis, Class 2 obesity due to excess calories with body mass index (BMI) of 35.0 to 35.9 in adult, Depression, Diabetes mellitus ([facility] [address] Gastroesophageal reflux disease, H/O tubal ligation, Headache, High cholesterol, and Osteoarthritis. Past Surgical History:   has a past surgical history that includes fracture surgery; Arm Surgery (Left); Tubal ligation (2004); [place] tooth extraction (Bilateral); Toe Surgery (Left, 08/15/2019); Foot neuroma surgery (Left, 8/15/2019); and hernia repair. Medications:   Scheduled Meds:   Continuous Infusions:    sodium chloride       PRN Meds:   Home Meds:   Prior to Admission medications    Medication Sig Start Date End Date Taking?  Authorizing Provider   omeprazole (PRILOSEC) 40 MG delayed release capsule Take 1 capsule by mouth every morning (before breakfast) for 14 days 2/17/22 3/3/22 Yes Srinath Gaytan MD   clarithromycin (BIAXIN) 500 MG tablet Take 1 tablet by mouth 2 times daily for 14 days 2/17/22 3/3/22 Yes Srinath Gaytan MD   metroNIDAZOLE (FLAGYL) 500 MG tablet Take 1 tablet by mouth 3 times daily for 14 days 2/17/22 3/3/22 Yes Srinath Gaytan MD   allopurinol (ZYLOPRIM) 300 MG tablet Take 1 tablet by mouth daily 2/10/22  Yes Srinath Gaytan MD   atorvastatin (LIPITOR) 40 MG tablet take 1 tablet by mouth once daily 2/10/22  Yes Nadira Mccray MD   amLODIPine (NORVASC) 2.5 MG tablet Take 1 tablet by mouth daily 2/10/22  Yes Nadira Mccray MD   sucralfate (CARAFATE) 1 GM tablet Take 1 tablet by mouth 4 times daily 2/10/22  Yes Nadira Mccray MD   ondansetron (ZOFRAN ODT) 4 MG disintegrating tablet Take 1 tablet by mouth every 8 hours as needed for Nausea 10/25/21  Yes TEJAS Jerry - CNP     Coumadin Use Last 7 Days:  no  Antiplatelet drug therapy use last 7 days: no  Other anticoagulant use last 7 days: no  Additional Medication Information:  See [facility]      Pre-Sedation Documentation and Exam:   I have reviewed the patient's history and review of systems.     Mallampati Airway Assessment:  Mallampati Class II - (soft palate, fauces & uvula are visible)    Prior History of Anesthesia Complications:   none    ASA Classification:  Class 2 - A normal healthy patient with mild systemic disease    Sedation/ Anesthesia Plan:   intravenous sedation    Medications Planned:   midazolam (Versed) intravenously and fentanyl intravenously    Patient is an appropriate candidate for plan of sedation: yes    Electronically signed by Char Goetz MD on 2/24/2022 at 1:46 PM

## 2022-02-24 NOTE — BRIEF OP NOTE
Brief Postoperative Note    Asif Pearson  YOB: 1980  [id number]    Pre-operative Diagnosis: Abnormal LFTs    Post-operative Diagnosis: Same    Procedure: US guided random core liver biopsy    Anesthesia: Moderate Sedation    Surgeons/Assistants: Luca    Estimated Blood Loss: less than 50     Complications: None    Specimens: Was Obtained: 3 core samples using 20 G needle    Electronically signed by Franco Bowden MD on 2/24/2022 at 1:48 PM

## 2022-03-03 LAB — SURGICAL PATHOLOGY REPORT: NORMAL

## 2022-03-09 DIAGNOSIS — R10.13 EPIGASTRIC PAIN: ICD-10-CM

## 2022-03-09 RX ORDER — SUCRALFATE 1 G/1
TABLET ORAL
Qty: 120 TABLET | Refills: 0 | Status: SHIPPED | OUTPATIENT
Start: 2022-03-09 | End: 2022-09-08

## 2022-03-09 NOTE — TELEPHONE ENCOUNTER
carafate pending for refill     Health Maintenance   Topic Date Due    Pneumococcal 0-64 years Vaccine (1 of 2 - PPSV23) Never done    Diabetic retinal exam  Never done    Hepatitis B vaccine (1 of 3 - Risk 3-dose series) Never done    Cervical cancer screen  04/26/2021    Flu vaccine (1) 09/01/2021    Diabetic foot exam  10/07/2021    Diabetic microalbuminuria test  10/20/2021    COVID-19 Vaccine (2 - Pfizer 3-dose series) 11/13/2021    A1C test (Diabetic or Prediabetic)  07/16/2022    Depression Screen  07/16/2022    Potassium monitoring  10/25/2022    Lipid screen  02/10/2023    Creatinine monitoring  02/10/2023    DTaP/Tdap/Td vaccine (2 - Td or Tdap) 03/23/2027    Hepatitis C screen  Completed    HIV screen  Completed    Hepatitis A vaccine  Aged Out    Hib vaccine  Aged Out    Meningococcal (ACWY) vaccine  Aged Out    Varicella vaccine  Discontinued             (applicable per patient's age: Cancer Screenings, Depression Screening, Fall Risk Screening, Immunizations)    Hemoglobin A1C (%)   Date Value   07/16/2021 5.2   10/07/2020 5.5   09/06/2019 6.0     Microalb/Crt.  Ratio (mcg/mg creat)   Date Value   10/20/2020 20     LDL Cholesterol (mg/dL)   Date Value   02/10/2022          AST (U/L)   Date Value   10/25/2021 123 (H)     ALT (U/L)   Date Value   02/10/2022 43 (H)     BUN (mg/dL)   Date Value   10/25/2021 15      (goal A1C is < 7)   (goal LDL is <100) need 30-50% reduction from baseline     BP Readings from Last 3 Encounters:   02/24/22 (!) 145/108   02/10/22 (!) 156/104   10/25/21 (!) 150/116    (goal /80)      All Future Testing planned in [facility]:      Next Visit Date:  Future Appointments   Date Time Provider Mallory Hubbard   3/10/2022  3:30 PM Paradise Parks MD [address] [facility]            Patient Active Problem List:     New persistent daily headache     Abnormal CT scan, head     Frequent headaches     Migraine without status migrainosus, not intractable Class 2 obesity due to excess calories with body mass index (BMI) of 35.0 to 35.9 in adult     Neck muscle spasm     Encounter for smoking cessation counseling     Gout tophi     Essential hypertension     Type 2 diabetes mellitus without complication, without long-term current use of insulin (HCC)     Gastroesophageal reflux disease     Hypertriglyceridemia     Epigastric pain     Encounter for HCV screening test for high risk patient     Helicobacter pylori gastritis

## 2022-03-10 ENCOUNTER — OFFICE VISIT (OUTPATIENT)
Dept: FAMILY MEDICINE CLINIC | Age: 42
End: 2022-03-10
Payer: COMMERCIAL

## 2022-03-10 VITALS
DIASTOLIC BLOOD PRESSURE: 81 MMHG | HEIGHT: 70 IN | HEART RATE: 113 BPM | WEIGHT: 214.2 LBS | BODY MASS INDEX: 30.67 KG/M2 | SYSTOLIC BLOOD PRESSURE: 134 MMHG | TEMPERATURE: 97.9 F

## 2022-03-10 DIAGNOSIS — R73.03 PREDIABETES: Primary | ICD-10-CM

## 2022-03-10 DIAGNOSIS — K29.70 HELICOBACTER PYLORI GASTRITIS: ICD-10-CM

## 2022-03-10 DIAGNOSIS — M17.0 PRIMARY OSTEOARTHRITIS OF BOTH KNEES: ICD-10-CM

## 2022-03-10 DIAGNOSIS — K74.69 OTHER CIRRHOSIS OF LIVER (HCC): ICD-10-CM

## 2022-03-10 DIAGNOSIS — B96.81 HELICOBACTER PYLORI GASTRITIS: ICD-10-CM

## 2022-03-10 LAB — HBA1C MFR BLD: 5.7 %

## 2022-03-10 PROCEDURE — 99211 OFF/OP EST MAY X REQ PHY/QHP: CPT | Performed by: FAMILY MEDICINE

## 2022-03-10 PROCEDURE — 83036 HEMOGLOBIN GLYCOSYLATED A1C: CPT | Performed by: STUDENT IN AN ORGANIZED HEALTH CARE EDUCATION/TRAINING PROGRAM

## 2022-03-10 PROCEDURE — 99213 OFFICE O/P EST LOW 20 MIN: CPT | Performed by: STUDENT IN AN ORGANIZED HEALTH CARE EDUCATION/TRAINING PROGRAM

## 2022-03-10 PROCEDURE — G8417 CALC BMI ABV UP PARAM F/U: HCPCS | Performed by: STUDENT IN AN ORGANIZED HEALTH CARE EDUCATION/TRAINING PROGRAM

## 2022-03-10 PROCEDURE — 1036F TOBACCO NON-USER: CPT | Performed by: STUDENT IN AN ORGANIZED HEALTH CARE EDUCATION/TRAINING PROGRAM

## 2022-03-10 PROCEDURE — G8427 DOCREV CUR MEDS BY ELIG CLIN: HCPCS | Performed by: STUDENT IN AN ORGANIZED HEALTH CARE EDUCATION/TRAINING PROGRAM

## 2022-03-10 PROCEDURE — G8484 FLU IMMUNIZE NO ADMIN: HCPCS | Performed by: STUDENT IN AN ORGANIZED HEALTH CARE EDUCATION/TRAINING PROGRAM

## 2022-03-10 PROCEDURE — 90746 HEPB VACCINE 3 DOSE ADULT IM: CPT | Performed by: FAMILY MEDICINE

## 2022-03-10 ASSESSMENT — ENCOUNTER SYMPTOMS
ABDOMINAL PAIN: 1
SHORTNESS OF BREATH: 0

## 2022-03-10 NOTE — PROGRESS NOTES
Visit Information    Have you changed or started any medications since your last visit including any over-the-counter medicines, vitamins, or herbal medicines? no   Have you stopped taking any of your medications? Is so, why? -  no  Are you having any side effects from any of your medications? - no    Have you seen any other physician or provider since your last visit? GI provider, and foot provider   Have you had any other diagnostic tests since your last visit?  no   Have you been seen in the emergency room and/or had an admission in a hospital since we last saw you?  no   Have you had your routine dental cleaning in the past 6 months?  no     Do you have an active MyChart account? If no, what is the barrier?   Yes    Patient Care Team:  Brian Mcdaniels MD as PCP - General (Emergency Medicine)    Medical History Review  Past Medical, Family, and Social History reviewed and does not contribute to the patient presenting condition    Health Maintenance   Topic Date Due    Pneumococcal 0-64 years Vaccine (1 of 2 - PPSV23) Never done    Diabetic retinal exam  Never done    Hepatitis B vaccine (1 of 3 - Risk 3-dose series) Never done    Cervical cancer screen  04/26/2021    Flu vaccine (1) 09/01/2021    Diabetic foot exam  10/07/2021    Diabetic microalbuminuria test  10/20/2021    A1C test (Diabetic or Prediabetic)  07/16/2022    Depression Screen  07/16/2022    Potassium monitoring  10/25/2022    Lipid screen  02/10/2023    Creatinine monitoring  02/10/2023    DTaP/Tdap/Td vaccine (2 - Td or Tdap) 03/23/2027    COVID-19 Vaccine  Completed    Hepatitis C screen  Completed    HIV screen  Completed    Hepatitis A vaccine  Aged Out    Hib vaccine  Aged Out    Meningococcal (ACWY) vaccine  Aged Out    Varicella vaccine  Discontinued

## 2022-03-10 NOTE — PROGRESS NOTES
I have reviewed and discussed key elements of [address] with the resident including plan of care and follow up and agree with the care reed plan. Follow up epigastric pain/H. Pylori. Has  Seen GI and has had liver biopsy : cirrhosis. Continues to follow with GI. Has a colonoscopy scheduled. Past Medical History:   Diagnosis Date    Anxiety     Arthritis     knees and hands    Class 2 obesity due to excess calories with body mass index (BMI) of 35.0 to 35.9 in adult 10/4/2018    Depression     Diabetes mellitus ([facility] [address]     DIET CONTROLLED/ PT DENIES Currently    Gastroesophageal reflux disease 2/10/2022    H/O tubal ligation 2004    Headache     High cholesterol     Osteoarthritis         Diagnosis Orders   1. Prediabetes  Microalbumin, Ur    POCT glycosylated hemoglobin (Hb A1C)   2. Helicobacter pylori gastritis     3. Other cirrhosis of liver ([facility] [address]     4.  Primary osteoarthritis of both knees  External Referral To Rheumatology

## 2022-03-10 NOTE — PATIENT INSTRUCTIONS
Thank you for letting us take care of you today. We hope all your questions were addressed. If a question was overlooked or something else comes to mind after you return home, please contact a member of your Care Team listed below. Your Care Team at [place] [address] is Team #3  Windy Liz MD (Faculty)  Blake Taylor MD (Faculty  Estjaya Lopez MD (Resident)  Jose Angel Trejo (Resident)   Jw Unger MD (Resident)  Jaquelin Simpson MD (Resident)  Virgen Glaser., JOSE Barrios., JOSE Davis., Dev Rojas., Taqueria Mendez ([facility])  Sergey Arndt (Clinical Practice Manager)  Sharee Lopez [facility] (Clinical Pharmacist)     Office phone number: [phone]    If you need to get in right away due to illness, please be advised we have \"Same Day\" appointments available Monday-Friday. Please call us at [phone] option #3 to schedule your \"Same Day\" appointment. Patient Education        hepatitis B adult vaccine  Pronunciation:  HEP a TOMA tis B a DULT VAX een  Brand:  Engerix-B, Heplisav-B, Recombivax HB Adult, Recombivax HB Dialysis Formulation  What is the most important information I should know about this vaccine? You should not receive hepatitis B vaccine if you are allergic to yeast.  This vaccine will not protect against hepatitis B if you are already infected with the virus, even if you do not yet show symptoms. What is hepatitis B vaccine? Hepatitis B is a serious disease caused by a virus. Hepatitis causes inflammation of the liver, vomiting, and jaundice (yellowing of the skin or eyes). Hepatitis can lead to liver cancer, cirrhosis, or death. Hepatitis B is spread through blood or bodily fluids, sexual contact, and by sharing items such as a razor, toothbrush, or IV drug needle with an infected person. Hepatitis B can also be passed to a baby during childbirth when the mother is infected. The hepatitis B adult vaccine is used to help prevent this disease in adults.  The dialysis form of this vaccine is for adults receiving dialysis. This vaccine helps your body develop immunity to hepatitis B, but it will not treat an active infection you already have. Vaccination with hepatitis B adult vaccine is recommended for all adults who are at risk of getting hepatitis B. Risk factors include: living with someone infected with hepatitis B virus; having more than one sex partner; men who have sex with men; having sexual contact with infected people; having hepatitis C, chronic liver disease, kidney disease, diabetes, HIV or AIDS; being on dialysis; using intravenous (IV) drugs; living or working in a facility for developmentally disabled people; working in healthcare or public safety and being exposed to blood or body fluids; living or working in a correctional facility; being a victim of sexual abuse or assault; and traveling to areas where hepatitis B is common. Like any vaccine, the hepatitis B vaccine may not provide protection from disease in every person. What should I discuss with my healthcare provider before receiving this vaccine? Hepatitis B vaccine will not protect against infection with hepatitis A, C, and E, or other viruses that affect the liver. It may also not protect against hepatitis B if you are already infected with the virus, even if you do not yet show symptoms.   You should not receive this vaccine if you have ever had a life-threatening allergic reaction to any vaccine containing hepatitis B, or if you are allergic to yeast.  If you have any of these other conditions, your vaccine may need to be postponed or not given at all:  · multiple sclerosis;  · kidney disease (or if you are on dialysis);  · a bleeding or blood clotting disorder such as hemophilia or easy bruising;  · weak immune system (caused by disease or by using certain medicine);  · an allergy to latex; or  · a neurologic disorder or disease affecting the brain (or if this was a reaction to a previous vaccine). You can still receive a vaccine if you have a minor cold. If you have a more severe illness with a fever or any type of infection, your doctor may recommend waiting until you get better before you receive this vaccine. It is not known whether this vaccine will harm an unborn baby. However, if you are at a high risk for infection with hepatitis B during pregnancy, your doctor should determine whether you need this vaccine. If you are pregnant, your name may be listed on a pregnancy registry to track the effects of this vaccine on the baby. It may not be safe to breastfeed while receiving this medicine. Ask your doctor about any risk. How is this vaccine given? This vaccine is given as an injection (shot) into a muscle. A healthcare provider will give you this injection. The hepatitis B vaccine is given in a series of 2 to 4 shots. The booster shots are sometimes given 1 month and 6 months after the first shot. If you have a high risk of hepatitis B infection, you may be given an additional booster 1 to 2 months after the third shot. Your individual booster schedule may be different from these guidelines. Follow your doctor's instructions or the schedule recommended by the health department of the state you live in. What happens if I miss a dose? Contact your doctor if you will miss a booster dose or if you get behind schedule. The next dose should be given as soon as possible. There is no need to start over. Be sure to receive all recommended doses of this vaccine or you may not be fully protected against disease. What happens if I overdose? An overdose of this vaccine is unlikely to occur. What should I avoid before or after receiving this vaccine? Follow your doctor's instructions about any restrictions on food, beverages, or activity. What are the possible side effects of this vaccine?   Get emergency medical help if you have signs of an allergic reaction (hives, difficult breathing, swelling in your face or throat) or a severe skin reaction (fever, sore throat, burning eyes, skin pain, red or purple skin rash with blistering and peeling). You should not receive a booster vaccine if you had a life-threatening allergic reaction after the first shot. Keep track of any and all side effects you have after receiving this vaccine. When you receive a booster dose, you will need to tell the doctor if the previous shot caused any side effects. Becoming infected with hepatitis B is much more dangerous to your health than receiving this vaccine. However, like any medicine, this vaccine can cause side effects but the risk of serious side effects is extremely low. Call your doctor at once if you have:  · a light-headed feeling, like you might pass out;  · seizure-like muscle movements; or  · fever, swollen glands. Common side effects include:  · headache;  · feeling tired; or  · redness, pain, swelling, or a lump where the shot was given. This is not a complete list of side effects and others may occur. Call your doctor for medical advice about side effects. You may report vaccine side effects to the [place] [address] and Human Services at [phone]. What other drugs will affect hepatitis B vaccine? Other drugs may affect this vaccine, including prescription and over-the-counter medicines, vitamins, and herbal products. Tell your doctor about all your current medicines and any medicine you start or stop using. Where can I get more information? Your doctor or pharmacist can provide more information about this vaccine. Additional information is available from your local health department or the Centers for Disease Control and Prevention. Remember, keep this and all other medicines out of the reach of children, never share your medicines with others, and use this medication only for the indication prescribed.    Every effort has been made to ensure that the information provided by [employer] [place] is accurate, up-to-date, and complete, but no guarantee is made to that effect. Drug information contained herein may be time sensitive. [facility] information has been compiled for use by healthcare practitioners and consumers in the [place] and therefore [facility] does not warrant that uses outside of the [place] are appropriate, unless specifically indicated otherwise. [facility]'s drug information does not endorse drugs, diagnose patients or recommend therapy. [facility] drug information is an informational resource designed to assist licensed healthcare practitioners in caring for their patients and/or to serve consumers viewing this service as a supplement to, and not a substitute for, the expertise, skill, knowledge and judgment of healthcare practitioners. The absence of a warning for a given drug or drug combination in no way should be construed to indicate that the drug or drug combination is safe, effective or appropriate for any given patient. [facility] does not assume any responsibility for any aspect of healthcare administered with the aid of information [facility] provides. The information contained herein is not intended to cover all possible uses, directions, precautions, warnings, drug interactions, allergic reactions, or adverse effects. If you have questions about the drugs you are taking, check with your doctor, nurse or pharmacist.  Copyright 8956-9952 [address]: 7.01. Revision date: 4/1/2020. Care instructions adapted under license by [facility] ([facility]). If you have questions about a medical condition or this instruction, always ask your healthcare professional. [place] [address] any warranty or liability for your use of this information.

## 2022-03-10 NOTE — PROGRESS NOTES
Subjective:    Teofilo Munroe is a 43 y.o. female with  has a past medical history of Anxiety, Arthritis, Class 2 obesity due to excess calories with body mass index (BMI) of 35.0 to 35.9 in adult, Depression, Diabetes mellitus ([address] Gastroesophageal reflux disease, H/O tubal ligation, Headache, High cholesterol, and Osteoarthritis. Family History   Problem Relation Age of Onset    Cancer Brother         kidney    High Blood Pressure Maternal Grandmother     Other Maternal Grandmother         aneurysm    High Blood Pressure Maternal Grandfather     High Blood Pressure Paternal Grandmother     High Blood Pressure Paternal Grandfather        Presented tot office today for:  Chief Complaint   Patient presents with    1 Month Follow-Up     epigastric pain, tested positive h pylori wants retested.  Health Maintenance     [facility] for pap, declined pneumo and flu vaccine        HPI 43year old female with a past medical history of type 2 diabetes (not on medication last HbA1c 5.2) and recent h pylori infection who is here for follow up. Epigastric Pain  - Was positive for h pylori  - Finished treatment few days ago  - Recent liver biopsy showing moderate steatosis, portal/periportal fibrosis and early bridging fibrosis  - Following Dr. Radha Chong at [facility]  - Colonoscopy scheduled for 03/18    Review of Systems   Constitutional: Negative for fever. Respiratory: Negative for shortness of breath. Cardiovascular: Negative for chest pain. Gastrointestinal: Positive for abdominal pain. Neurological: Negative for weakness and numbness. All other systems reviewed and are negative.     Objective:    /81 (Site: Left Upper Arm, Position: Sitting, Cuff Size: Medium Adult) Comment: machine  Pulse 113   Temp 97.9 °F (36.6 °C) (Temporal)   Ht 5' 10\" (1.778 m)   Wt 214 lb 3.2 oz (97.2 kg)   BMI 30.73 kg/m²    BP Readings from Last 3 Encounters:   03/10/22 134/81   02/24/22 (!) 145/108 02/10/22 (!) 156/104     Physical Exam  Vitals reviewed. Constitutional:       General: She is awake. Cardiovascular:      Rate and Rhythm: Normal rate and regular rhythm. Pulmonary:      Effort: Pulmonary effort is normal.      Breath sounds: Normal breath sounds and air entry. Abdominal:      General: Abdomen is flat. Bowel sounds are normal.      Palpations: Abdomen is soft. Tenderness: There is abdominal tenderness in the right upper quadrant and epigastric area. Negative signs include Hancock's sign. Neurological:      Mental Status: She is alert. Psychiatric:         Behavior: Behavior is cooperative. Lab Results   Component Value Date    WBC 5.9 02/10/2022    HGB 12.9 02/10/2022    HCT 37.9 02/10/2022     02/24/2022    CHOL 235 (H) 02/10/2022    TRIG 1,312 (H) 02/10/2022    HDL 39 (L) 02/10/2022    LDLDIRECT 36 02/10/2022    ALT 43 (H) 02/10/2022     (H) 10/25/2021     10/25/2021    K 4.0 10/25/2021     10/25/2021    CREATININE 0.47 (L) 02/10/2022    BUN 15 10/25/2021    CO2 19 (L) 10/25/2021    TSH 3.17 03/26/2019    INR 1.3 02/24/2022    LABA1C 5.2 07/16/2021    LABMICR 20 10/20/2020     Lab Results   Component Value Date    CALCIUM 8.1 (L) 10/25/2021     Lab Results   Component Value Date    LDLCHOLESTEROL      02/10/2022    LDLDIRECT 36 02/10/2022       Assessment and Plan:    1. Prediabetes  - Microalbumin, Ur; Future  - POCT glycosylated hemoglobin (Hb A1C) - 5.7  - Continue with diet and exercise    2. Helicobacter pylori gastritis  - Completed treatment few days ago  - Will check stool antigen in 4 weeks  - Colonoscopy scheduled for the 03/18   - Following GI    3. Other cirrhosis of liver (HCC)  - Following GI every 6 months  - Abstaining from alcohol    4.  Primary osteoarthritis of both knees  - Used to see Dr. Estefania Velasquez, will refer to him per patient preference  - External Referral To Rheumatology      Requested Prescriptions      No prescriptions requested or ordered in this encounter       There are no discontinued medications. Return in about 3 months (around 6/10/2022) for FU Abdominal Pain, Liver enzymes.

## 2022-04-06 ENCOUNTER — TELEPHONE (OUTPATIENT)
Dept: FAMILY MEDICINE CLINIC | Age: 42
End: 2022-04-06

## 2022-04-06 NOTE — TELEPHONE ENCOUNTER
Nathaly Dinh from Rheumatology called and per pt insurance she needs to referral for each visit. They need a referral back dated for 8/20/21 and 12/3/2021.

## 2022-04-07 ENCOUNTER — TELEPHONE (OUTPATIENT)
Dept: FAMILY MEDICINE CLINIC | Age: 42
End: 2022-04-07

## 2022-04-07 ENCOUNTER — OFFICE VISIT (OUTPATIENT)
Dept: FAMILY MEDICINE CLINIC | Age: 42
End: 2022-04-07
Payer: COMMERCIAL

## 2022-04-07 VITALS
DIASTOLIC BLOOD PRESSURE: 78 MMHG | WEIGHT: 217 LBS | BODY MASS INDEX: 31.07 KG/M2 | HEIGHT: 70 IN | SYSTOLIC BLOOD PRESSURE: 116 MMHG | TEMPERATURE: 97.5 F | HEART RATE: 108 BPM

## 2022-04-07 DIAGNOSIS — Z00.00 ENCOUNTER FOR PREVENTIVE HEALTH EXAMINATION: Primary | ICD-10-CM

## 2022-04-07 DIAGNOSIS — Z23 HEPATITIS B VACCINATION ADMINISTERED AT CURRENT VISIT: ICD-10-CM

## 2022-04-07 DIAGNOSIS — M17.0 PRIMARY OSTEOARTHRITIS OF BOTH KNEES: Primary | ICD-10-CM

## 2022-04-07 PROCEDURE — 90746 HEPB VACCINE 3 DOSE ADULT IM: CPT | Performed by: FAMILY MEDICINE

## 2022-04-07 PROCEDURE — 99396 PREV VISIT EST AGE 40-64: CPT | Performed by: STUDENT IN AN ORGANIZED HEALTH CARE EDUCATION/TRAINING PROGRAM

## 2022-04-07 ASSESSMENT — ENCOUNTER SYMPTOMS
VOMITING: 0
SHORTNESS OF BREATH: 0
NAUSEA: 0
DIARRHEA: 0
ABDOMINAL PAIN: 0

## 2022-04-07 NOTE — PROGRESS NOTES
Attending Physician Statement  I have discussed the care of ShelleyWilliamsincluding pertinent history and exam findings,  with the resident. I have reviewed the key elements of all parts of the encounter with the resident. I agree with the assessment, plan and orders as documented by the resident.   (GE Modifier)    HM- Hep B 2nd dose given  Pap smear

## 2022-04-07 NOTE — TELEPHONE ENCOUNTER
As per notes from 3/10/2022, she follows with GI - Dr. Kelly Herrera at [facility].

## 2022-04-07 NOTE — PROGRESS NOTES
Visit Information    Have you changed or started any medications since your last visit including any over-the-counter medicines, vitamins, or herbal medicines? no   Have you stopped taking any of your medications? Is so, why? -  no  Are you having any side effects from any of your medications? - no    Have you seen any other physician or provider since your last visit?  no   Have you had any other diagnostic tests since your last visit? yes - labs   Have you been seen in the emergency room and/or had an admission in a hospital since we last saw you?  no   Have you had your routine dental cleaning in the past 6 months?  no     Do you have an active MyChart account? If no, what is the barrier?   Yes    Patient Care Team:  Jason Chaudhari MD as PCP - General (Emergency Medicine)    Medical History Review  Past Medical, Family, and Social History reviewed and does not contribute to the patient presenting condition    Health Maintenance   Topic Date Due    Pneumococcal 0-64 years Vaccine (1 of 2 - PPSV23) Never done    Diabetic retinal exam  Never done    Cervical cancer screen  04/26/2021    Diabetic microalbuminuria test  10/20/2021    Hepatitis B vaccine (2 of 3 - Risk 3-dose series) 04/07/2022    Diabetic foot exam  03/10/2023 (Originally 10/7/2021)    Depression Screen  07/16/2022    Flu vaccine (Season Ended) 09/01/2022    Potassium monitoring  10/25/2022    Lipid screen  02/10/2023    Creatinine monitoring  02/10/2023    A1C test (Diabetic or Prediabetic)  03/10/2023    DTaP/Tdap/Td vaccine (2 - Td or Tdap) 03/23/2027    Hepatitis A vaccine  Completed    COVID-19 Vaccine  Completed    Hepatitis C screen  Completed    HIV screen  Completed    Hib vaccine  Aged Out    Meningococcal (ACWY) vaccine  Aged Out    Varicella vaccine  Discontinued

## 2022-04-07 NOTE — PROGRESS NOTES
Subjective:    Montrell Blake is a 43 y.o. female with  has a past medical history of Anxiety, Arthritis, Class 2 obesity due to excess calories with body mass index (BMI) of 35.0 to 35.9 in adult, Depression, Diabetes mellitus ([address] Gastroesophageal reflux disease, H/O tubal ligation, Headache, High cholesterol, and Osteoarthritis. Presented to the office today for:  Chief Complaint   Patient presents with    Immunizations     2nd Hep-B needed today        HPI    43year old female here today for 2nd dose of hep B vaccine. No acute concerns today. Doing well otherwise. Due for pap smear, will have her schedule a visit for that. Review of Systems   Constitutional: Negative for chills and fever. Respiratory: Negative for shortness of breath. Cardiovascular: Negative for chest pain. Gastrointestinal: Negative for abdominal pain, diarrhea, nausea and vomiting. The patient has a   Family History   Problem Relation Age of Onset    Cancer Brother         kidney    High Blood Pressure Maternal Grandmother     Other Maternal Grandmother         aneurysm    High Blood Pressure Maternal Grandfather     High Blood Pressure Paternal Grandmother     High Blood Pressure Paternal Grandfather        Objective:    /78   Pulse 108   Temp 97.5 °F (36.4 °C)   Ht 5' 10\" (1.778 m)   Wt 217 lb (98.4 kg)   BMI 31.14 kg/m²    BP Readings from Last 3 Encounters:   04/07/22 116/78   03/10/22 134/81   02/24/22 (!) 145/108       Physical Exam  Vitals reviewed. HENT:      Head: Normocephalic and atraumatic. Cardiovascular:      Rate and Rhythm: Normal rate and regular rhythm. Pulses: Normal pulses. Heart sounds: Normal heart sounds. Pulmonary:      Effort: Pulmonary effort is normal. No respiratory distress. Breath sounds: Normal breath sounds. No wheezing. Abdominal:      Palpations: Abdomen is soft. Tenderness: There is no abdominal tenderness. There is no guarding. Skin:     General: Skin is warm and dry. Neurological:      General: No focal deficit present. Mental Status: She is alert and oriented to person, place, and time. Lab Results   Component Value Date    WBC 5.9 02/10/2022    HGB 12.9 02/10/2022    HCT 37.9 02/10/2022     02/24/2022    CHOL 235 (H) 02/10/2022    TRIG 1,312 (H) 02/10/2022    HDL 39 (L) 02/10/2022    LDLDIRECT 36 02/10/2022    ALT 43 (H) 02/10/2022     (H) 10/25/2021     10/25/2021    K 4.0 10/25/2021     10/25/2021    CREATININE 0.47 (L) 02/10/2022    BUN 15 10/25/2021    CO2 19 (L) 10/25/2021    TSH 3.17 03/26/2019    INR 1.3 02/24/2022    LABA1C 5.7 03/10/2022    LABMICR 20 10/20/2020     Lab Results   Component Value Date    CALCIUM 8.1 (L) 10/25/2021     Lab Results   Component Value Date    LDLCHOLESTEROL      02/10/2022    LDLDIRECT 36 02/10/2022       Assessment and Plan:    1. Encounter for preventive health examination  - update vaccinations  - no other concerns today, doing well    2. Hepatitis B vaccination administered at current visit  - Hep B Vaccine Adult (ENGERIX-B)          Requested Prescriptions      No prescriptions requested or ordered in this encounter       There are no discontinued medications. Jan Ferrer received counseling on the following healthy behaviors: nutrition, exercise and medication adherence    Discussed use,benefit, and side effects of prescribed medications. Barriers to medication compliance addressed. All patient questions answered. Pt voiced understanding. Return in about 3 months (around 7/7/2022). Disclaimer: Some orall of this note was transcribed using voice-recognition software. This may cause typographical errors occasionally. Although all effort is made to fix these errors, please do not hesitate to contact our office if there Roldan Arias concern with the understanding of this note.

## 2022-04-07 NOTE — TELEPHONE ENCOUNTER
Patient calling stating that she does have a hx of stomach problems and would like to know if PCP can sign a GI referral for her.

## 2022-04-15 ENCOUNTER — OFFICE VISIT (OUTPATIENT)
Dept: FAMILY MEDICINE CLINIC | Age: 42
End: 2022-04-15
Payer: COMMERCIAL

## 2022-04-15 VITALS
HEART RATE: 117 BPM | SYSTOLIC BLOOD PRESSURE: 134 MMHG | TEMPERATURE: 97.2 F | HEIGHT: 70 IN | BODY MASS INDEX: 31.64 KG/M2 | WEIGHT: 221 LBS | DIASTOLIC BLOOD PRESSURE: 83 MMHG

## 2022-04-15 DIAGNOSIS — R10.13 EPIGASTRIC PAIN: Primary | ICD-10-CM

## 2022-04-15 DIAGNOSIS — E78.1 HYPERTRIGLYCERIDEMIA: ICD-10-CM

## 2022-04-15 DIAGNOSIS — M1A.9XX1 GOUT TOPHI: ICD-10-CM

## 2022-04-15 PROCEDURE — G8417 CALC BMI ABV UP PARAM F/U: HCPCS | Performed by: STUDENT IN AN ORGANIZED HEALTH CARE EDUCATION/TRAINING PROGRAM

## 2022-04-15 PROCEDURE — G8427 DOCREV CUR MEDS BY ELIG CLIN: HCPCS | Performed by: STUDENT IN AN ORGANIZED HEALTH CARE EDUCATION/TRAINING PROGRAM

## 2022-04-15 PROCEDURE — 1036F TOBACCO NON-USER: CPT | Performed by: STUDENT IN AN ORGANIZED HEALTH CARE EDUCATION/TRAINING PROGRAM

## 2022-04-15 PROCEDURE — 99213 OFFICE O/P EST LOW 20 MIN: CPT | Performed by: STUDENT IN AN ORGANIZED HEALTH CARE EDUCATION/TRAINING PROGRAM

## 2022-04-15 RX ORDER — FENOFIBRATE 48 MG/1
TABLET, COATED ORAL
COMMUNITY
Start: 2022-03-16 | End: 2022-06-14

## 2022-04-15 RX ORDER — ALLOPURINOL 300 MG/1
TABLET ORAL
Qty: 30 TABLET | Refills: 2
Start: 2022-04-15 | End: 2022-05-09

## 2022-04-15 RX ORDER — DICYCLOMINE HCL 20 MG
20 TABLET ORAL 4 TIMES DAILY PRN
Qty: 120 TABLET | Refills: 1 | Status: SHIPPED | OUTPATIENT
Start: 2022-04-15 | End: 2022-06-13

## 2022-04-15 RX ORDER — SIMETHICONE 180 MG
CAPSULE ORAL
Qty: 90 CAPSULE | Refills: 1 | Status: SHIPPED | OUTPATIENT
Start: 2022-04-15 | End: 2022-07-08

## 2022-04-15 ASSESSMENT — ENCOUNTER SYMPTOMS
RHINORRHEA: 0
WHEEZING: 0
VOMITING: 0
CONSTIPATION: 1
SORE THROAT: 0
COUGH: 0
ABDOMINAL PAIN: 1
DIARRHEA: 1
NAUSEA: 0
SHORTNESS OF BREATH: 0

## 2022-04-15 NOTE — PROGRESS NOTES
6 [facility] Nune[facility] Residency Program - Outpatient Note        Zahra Odonnell is a 43 y.o. female  presented to the office on 04/15/22:        Upper abdominal pain for 1 year  Pt follows with GI, does have a history of gastric ulcers, also history of H Pylori which was treated. Patient complains of upper abdominal pain and cramping whenever she tries to eat, and therefore has not been eating much at all  Pt recently underwent abdominal CT and was told by GI to take miralax and colace due to increased stool burden. Pt complains of diarrhea today. We discussed various approaches to symptomatic treatment of her abdominal pain/spasms. We will try antispasmodic and antigas, pt will remain on PPI & treatments provided by GI. Pt has a history of very high triglycerides, levels >1000, however lipase is negative. Pt was recently started on fibrate medication. Pt has been on allopurinol for 2 years but states she has only ever had 1 gout flare. We discussed tapering and stopping the allopurinol. Pt understands this may cause a gout flare, and we will treat with steroid burst if this is the case and restart allopurinol. Pt is agreeable. Diagnosis Orders   1. Epigastric pain  Simethicone 180 MG CAPS    dicyclomine (BENTYL) 20 MG tablet   2. Gout tophi  allopurinol (ZYLOPRIM) 300 MG tablet   3. Hypertriglyceridemia  fenofibrate (TRICOR) 48 MG tablet       Plan:  1. Symptomatic treatment of gastric pain and cramping, continue to follow GI  2. Taper and DC allopurinol   3. Dizziness and lightheadedness likely due to patient not eating much for past several days         Review of Systems   Constitutional: Negative for chills, diaphoresis and fever. HENT: Negative for congestion, rhinorrhea and sore throat. Respiratory: Negative for cough, shortness of breath and wheezing. Cardiovascular: Negative for chest pain, palpitations and leg swelling.    Gastrointestinal: Positive for abdominal pain, constipation and diarrhea. Negative for nausea and vomiting. Genitourinary: Negative for difficulty urinating, dysuria and frequency. Neurological: Positive for dizziness and light-headedness. Negative for syncope. Vitals:    04/15/22 1417   BP: 134/83   Pulse: 117   Temp: 97.2 °F (36.2 °C)             Physical Exam  Vitals and nursing note reviewed. Constitutional:       General: She is not in acute distress. HENT:      Mouth/Throat:      Mouth: Mucous membranes are dry. Eyes:      Extraocular Movements: Extraocular movements intact. Conjunctiva/sclera: Conjunctivae normal.   Cardiovascular:      Rate and Rhythm: Normal rate and regular rhythm. Pulses: Normal pulses. Heart sounds: Normal heart sounds. Pulmonary:      Effort: Pulmonary effort is normal.      Breath sounds: Normal breath sounds. Abdominal:      General: Bowel sounds are normal. There is no distension. Palpations: Abdomen is soft. Tenderness: There is abdominal tenderness (diffuse ). There is no guarding. Musculoskeletal:      Right lower leg: No edema. Left lower leg: No edema. Neurological:      General: No focal deficit present. Mental Status: She is alert. Return in about 4 weeks (around 5/13/2022). Jayson Avalos MD  Family Medicine PGY-3  04/15/22 at 2:58 PM

## 2022-04-15 NOTE — PATIENT INSTRUCTIONS
Thank you for letting us take care of you today. We hope all your questions were addressed. If a question was overlooked or something else comes to mind after you return home, please contact a member of your Care Team listed below. Your Care Team at [place] [address] is Team #2  Anu Saenz DO (Faculty)  Jermaine Pisano (Faculty)  Kimberly Putnam MD (Resident)  Bert Mohs, MD (Resident)  Klarissa Garcia MD (Resident)  Patricia Newman MD (Resident)  Asher Cam., JOSE Richmond Band.,  AMADO Montelongo Cons., Ernie [facility] office)  Lela Mauricio [place] (Clinical Practice Manager)  Tisha Shukla [facility] (Clinical Pharmacist)     Office phone number: [phone]    If you need to get in right away due to illness, please be advised we have \"Same Day\" appointments available Monday-Friday. Please call us at [phone] option #3 to schedule your \"Same Day\" appointment. Patient Education        dicyclomine (oral/injection)  Pronunciation: dye SYE kloe meen  Brand: Bentyl, Cindy, Dicyclocot  What is the most important information I should know about dicyclomine? Many drugs can affect dicyclomine. Tell your doctor about all your current medicines. What is dicyclomine? Dicyclomine is used to treat functional bowel or irritable bowel syndrome. Dicyclomine may also be used for purposes not listed in this medication guide. What should I discuss with my healthcare provider before taking dicyclomine? You should not use dicyclomine if you are allergic to it, or if you have:   glaucoma;   a bladder obstruction or other urination problems;   a blockage in your digestive tract (stomach or intestines);   severe ulcerative colitis;   gastroesophageal reflux disease (GERD);   a serious heart condition and active bleeding;   myasthenia gravis; or   if you are breastfeeding a baby. Not approved for use by anyone younger than 25years old. Dicyclomine should never be given to a child younger than 7 months old.   Tell your doctor if you have ever had:   heart problems or high blood pressure;   a stroke;   ulcerative colitis;   an ileostomy or colostomy;   an enlarged prostate; or   liver or kidney disease. Older adults may be more sensitive to the effects of this medicine. Tell your doctor if you are pregnant. Do not breastfeed. How should I take dicyclomine? Follow all directions on your prescription label and read all medication guides or instruction sheets. Your doctor may occasionally change your dose. Use themedicine exactly as directed. Dicyclomine oral is taken by mouth. Measure liquid medicine with the supplied syringe or a dose-measuring device (not a kitchen spoon). Dicyclomine injection is given in a muscle if you are unable to take the medicine by mouth. Call your doctor if your symptoms do not improve after 2 weeks. Store at room temperature away from moisture and heat. What happens if I miss a dose? Skip the missed dose and use your next dose at the regular time. Do not use two doses at one time. What happens if I overdose? Seek emergency medical attention or call the Poison Help line at 1-482.173.9408. Overdose can cause nausea, vomiting, dilated pupils, weakness or loss of movement in any part of your body, trouble swallowing, fainting, or seizure(convulsions). What should I avoid while taking dicyclomine? May cause dizziness or blurred vision. Avoid driving or hazardous activityuntil you know how this medicine will affect you. Avoid becoming overheated or dehydrated during exercise and in hot weather. Dicyclomine can decrease sweating and you may be more prone to heat stroke. Tell your doctor if you have a fever while taking dicyclomine. Avoid using an antacid. Antacids can make it harder for your body to absorb dicyclomine oral.  What are the possible side effects of dicyclomine?   Get emergency medical help if you have signs of an allergic reaction: hives; difficult breathing; swelling of your face, lips, tongue, or throat. Call your doctor at once if you have:   fast or slow heartbeats, pounding heartbeats or fluttering in your chest;   confusion, agitation, hallucinations, unusual thoughts or behavior;   problems with memory or speech;   problems with balance or muscle movement;   diarrhea, severe constipation, or worsening of bowel symptoms;   trouble swallowing;   bruising, swelling, or pain where a dicyclomine injection was given; or   dehydration  --dizziness, confusion, feeling very thirsty, less urination or sweating. Confusion and mood or behavior changes may be more likely in older adults. Common side effects may include:   drowsiness, dizziness, weakness, nervousness;   blurred vision;   dry mouth; or   nausea. This is not a complete list of side effects and others may occur. Call your doctor for medical advice about side effects. You may report side effects toFDA at 1-154-WZC-2989. What other drugs will affect dicyclomine? Using dicyclomine with other drugs that make you drowsy can worsen this effect. Ask your doctor before using opioid medication, a sleeping pill, a musclerelaxer, or medicine for anxiety or seizures. Tell your doctor about all your current medicines. Many drugs can affect dicyclomine, especially:   bronchodilator asthma medication;   cold or allergy medicine (Benadryl and others);   glaucoma medication;   heart medication;   medicine to treat depression, anxiety, mood disorders, or mental illness;   medicine to treat overactive bladder;   medicine to treat Parkinson's disease; or   medicine to treat stomach problems, motion sickness, or irritable bowel syndrome. This list is not complete and many other drugs may affect dicyclomine. This includes prescription and over-the-counter medicines, vitamins, andherbal products. Not all possible drug interactions are listed here. Where can I get more information?   Your pharmacist can provide more disclaims any warranty or liability for your use of this information.

## 2022-04-15 NOTE — PROGRESS NOTES
Visit Information    Have you changed or started any medications since your last visit including any over-the-counter medicines, vitamins, or herbal medicines? no   Have you stopped taking any of your medications? Is so, why? -  no  Are you having any side effects from any of your medications? - no    Have you seen any other physician or provider since your last visit?  no   Have you had any other diagnostic tests since your last visit?  no   Have you been seen in the emergency room and/or had an admission in a hospital since we last saw you?  no   Have you had your routine dental cleaning in the past 6 months?  no     Do you have an active MyChart account? If no, what is the barrier?   Yes    Patient Care Team:  Radha Glover MD as PCP - General (Emergency Medicine)    Medical History Review  Past Medical, Family, and Social History reviewed and does not contribute to the patient presenting condition    Health Maintenance   Topic Date Due    Pneumococcal 0-64 years Vaccine (1 - PCV) Never done    Diabetic retinal exam  Never done    Cervical cancer screen  04/26/2021    Diabetic microalbuminuria test  10/20/2021    Diabetic foot exam  03/10/2023 (Originally 10/7/2021)    Depression Screen  07/16/2022    Hepatitis B vaccine (3 of 3 - Risk 3-dose series) 08/07/2022    Flu vaccine (Season Ended) 09/01/2022    Potassium monitoring  10/25/2022    Lipid screen  02/10/2023    Creatinine monitoring  02/10/2023    A1C test (Diabetic or Prediabetic)  03/10/2023    DTaP/Tdap/Td vaccine (2 - Td or Tdap) 03/23/2027    Hepatitis A vaccine  Completed    COVID-19 Vaccine  Completed    Hepatitis C screen  Completed    HIV screen  Completed    Hib vaccine  Aged Out    Meningococcal (ACWY) vaccine  Aged Out    Varicella vaccine  Discontinued

## 2022-04-25 NOTE — PROGRESS NOTES
Attending Physician Statement    Wt Readings from Last 3 Encounters:   04/15/22 221 lb (100.2 kg)   04/07/22 217 lb (98.4 kg)   03/10/22 214 lb 3.2 oz (97.2 kg)     Temp Readings from Last 3 Encounters:   04/15/22 97.2 °F (36.2 °C) (Temporal)   04/07/22 97.5 °F (36.4 °C)   03/10/22 97.9 °F (36.6 °C) (Temporal)     BP Readings from Last 3 Encounters:   04/15/22 134/83   04/07/22 116/78   03/10/22 134/81     Pulse Readings from Last 3 Encounters:   04/15/22 117   04/07/22 108   03/10/22 113         I have discussed the care of [place] Javier, including pertinent history and exam findings with the resident. I have reviewed the key elements of all parts of the encounter with the resident. I agree with the assessment, plan and orders as documented by the resident.   (GE Modifier)

## 2022-05-09 DIAGNOSIS — K29.70 HELICOBACTER PYLORI GASTRITIS: ICD-10-CM

## 2022-05-09 DIAGNOSIS — B96.81 HELICOBACTER PYLORI GASTRITIS: ICD-10-CM

## 2022-05-09 DIAGNOSIS — M1A.9XX1 GOUT TOPHI: ICD-10-CM

## 2022-05-09 RX ORDER — ALLOPURINOL 300 MG/1
TABLET ORAL
Qty: 30 TABLET | Refills: 2 | Status: SHIPPED | OUTPATIENT
Start: 2022-05-09 | End: 2022-05-19

## 2022-05-09 RX ORDER — OMEPRAZOLE 40 MG/1
CAPSULE, DELAYED RELEASE ORAL
Qty: 30 CAPSULE | Refills: 1 | Status: SHIPPED | OUTPATIENT
Start: 2022-05-09 | End: 2022-05-19 | Stop reason: SDUPTHER

## 2022-05-09 NOTE — TELEPHONE ENCOUNTER
E-scribe request for med refills. Please review and e-scribe if applicable. Last Visit Date:  04/15/2022  Next Visit Date:  5/19/2022    Hemoglobin A1C (%)   Date Value   03/10/2022 5.7   07/16/2021 5.2   10/07/2020 5.5             ( goal A1C is < 7)   Microalb/Crt.  Ratio (mcg/mg creat)   Date Value   10/20/2020 20     LDL Cholesterol (mg/dL)   Date Value   02/10/2022            (goal LDL is <100)   AST (U/L)   Date Value   10/25/2021 123 (H)     ALT (U/L)   Date Value   02/10/2022 43 (H)     BUN (mg/dL)   Date Value   10/25/2021 15     BP Readings from Last 3 Encounters:   04/15/22 134/83   04/07/22 116/78   03/10/22 134/81          (goal 120/80)        Patient Active Problem List:     New persistent daily headache     Abnormal CT scan, head     Frequent headaches     Migraine without status migrainosus, not intractable     Class 2 obesity due to excess calories with body mass index (BMI) of 35.0 to 35.9 in adult     Neck muscle spasm     Encounter for smoking cessation counseling     Gout tophi     Essential hypertension     Type 2 diabetes mellitus without complication, without long-term current use of insulin (HCC)     Gastroesophageal reflux disease     Hypertriglyceridemia     Epigastric pain     Encounter for HCV screening test for high risk patient     Helicobacter pylori gastritis      ----Valdemar Lopez

## 2022-05-19 ENCOUNTER — OFFICE VISIT (OUTPATIENT)
Dept: FAMILY MEDICINE CLINIC | Age: 42
End: 2022-05-19
Payer: MEDICARE

## 2022-05-19 VITALS
SYSTOLIC BLOOD PRESSURE: 115 MMHG | HEIGHT: 70 IN | TEMPERATURE: 97.7 F | HEART RATE: 96 BPM | BODY MASS INDEX: 31.52 KG/M2 | WEIGHT: 220.2 LBS | DIASTOLIC BLOOD PRESSURE: 76 MMHG

## 2022-05-19 DIAGNOSIS — K29.70 HELICOBACTER PYLORI GASTRITIS: ICD-10-CM

## 2022-05-19 DIAGNOSIS — G89.29 CHRONIC EPIGASTRIC PAIN: Primary | ICD-10-CM

## 2022-05-19 DIAGNOSIS — E11.9 TYPE 2 DIABETES MELLITUS WITHOUT COMPLICATION, WITHOUT LONG-TERM CURRENT USE OF INSULIN (HCC): ICD-10-CM

## 2022-05-19 DIAGNOSIS — R10.13 CHRONIC EPIGASTRIC PAIN: Primary | ICD-10-CM

## 2022-05-19 DIAGNOSIS — B96.81 HELICOBACTER PYLORI GASTRITIS: ICD-10-CM

## 2022-05-19 PROCEDURE — 2022F DILAT RTA XM EVC RTNOPTHY: CPT | Performed by: FAMILY MEDICINE

## 2022-05-19 PROCEDURE — G8417 CALC BMI ABV UP PARAM F/U: HCPCS | Performed by: FAMILY MEDICINE

## 2022-05-19 PROCEDURE — 3044F HG A1C LEVEL LT 7.0%: CPT | Performed by: FAMILY MEDICINE

## 2022-05-19 PROCEDURE — 1036F TOBACCO NON-USER: CPT | Performed by: FAMILY MEDICINE

## 2022-05-19 PROCEDURE — G8427 DOCREV CUR MEDS BY ELIG CLIN: HCPCS | Performed by: FAMILY MEDICINE

## 2022-05-19 PROCEDURE — 99213 OFFICE O/P EST LOW 20 MIN: CPT | Performed by: STUDENT IN AN ORGANIZED HEALTH CARE EDUCATION/TRAINING PROGRAM

## 2022-05-19 RX ORDER — OMEPRAZOLE 40 MG/1
40 CAPSULE, DELAYED RELEASE ORAL
Qty: 60 CAPSULE | Refills: 3 | Status: SHIPPED | OUTPATIENT
Start: 2022-05-19 | End: 2022-07-07

## 2022-05-19 SDOH — ECONOMIC STABILITY: FOOD INSECURITY: WITHIN THE PAST 12 MONTHS, YOU WORRIED THAT YOUR FOOD WOULD RUN OUT BEFORE YOU GOT MONEY TO BUY MORE.: NEVER TRUE

## 2022-05-19 SDOH — ECONOMIC STABILITY: FOOD INSECURITY: WITHIN THE PAST 12 MONTHS, THE FOOD YOU BOUGHT JUST DIDN'T LAST AND YOU DIDN'T HAVE MONEY TO GET MORE.: NEVER TRUE

## 2022-05-19 ASSESSMENT — PATIENT HEALTH QUESTIONNAIRE - PHQ9
2. FEELING DOWN, DEPRESSED OR HOPELESS: 0
SUM OF ALL RESPONSES TO PHQ9 QUESTIONS 1 & 2: 0
SUM OF ALL RESPONSES TO PHQ QUESTIONS 1-9: 0
1. LITTLE INTEREST OR PLEASURE IN DOING THINGS: 0
SUM OF ALL RESPONSES TO PHQ QUESTIONS 1-9: 0

## 2022-05-19 ASSESSMENT — ENCOUNTER SYMPTOMS
DIARRHEA: 1
CONSTIPATION: 0
ABDOMINAL PAIN: 1
NAUSEA: 0
VOMITING: 0
SHORTNESS OF BREATH: 0

## 2022-05-19 ASSESSMENT — SOCIAL DETERMINANTS OF HEALTH (SDOH): HOW HARD IS IT FOR YOU TO PAY FOR THE VERY BASICS LIKE FOOD, HOUSING, MEDICAL CARE, AND HEATING?: SOMEWHAT HARD

## 2022-05-19 NOTE — PROGRESS NOTES
Visit Information    Have you changed or started any medications since your last visit including any over-the-counter medicines, vitamins, or herbal medicines? no   Have you stopped taking any of your medications? Is so, why? -  no  Are you having any side effects from any of your medications? - no    Have you seen any other physician or provider since your last visit?  no   Have you had any other diagnostic tests since your last visit?  no   Have you been seen in the emergency room and/or had an admission in a hospital since we last saw you?  no   Have you had your routine dental cleaning in the past 6 months?  no     Do you have an active MyChart account? If no, what is the barrier?   Yes    Patient Care Team:  Bennett Malagon MD as PCP - General (Emergency Medicine)    Medical History Review  Past Medical, Family, and Social History reviewed and does not contribute to the patient presenting condition    Health Maintenance   Topic Date Due    Pneumococcal 0-64 years Vaccine (1 - PCV) Never done    Diabetic retinal exam  Never done    Cervical cancer screen  04/26/2021    Diabetic microalbuminuria test  10/20/2021    Diabetic foot exam  03/10/2023 (Originally 10/7/2021)    Depression Screen  07/16/2022    Hepatitis B vaccine (3 of 3 - Risk 3-dose series) 08/07/2022    Flu vaccine (Season Ended) 09/01/2022    Lipids  02/10/2023    A1C test (Diabetic or Prediabetic)  03/10/2023    DTaP/Tdap/Td vaccine (2 - Td or Tdap) 03/23/2027    Hepatitis A vaccine  Completed    COVID-19 Vaccine  Completed    Hepatitis C screen  Completed    HIV screen  Completed    Hib vaccine  Aged Out    Meningococcal (ACWY) vaccine  Aged Out    Varicella vaccine  Discontinued

## 2022-05-19 NOTE — PROGRESS NOTES
6 Nyla Nunez [facility] Residency Program - Outpatient Note        Indy Velasquez is a 43 y.o. female  presented to the office on 05/19/22:      History of chronic epigastric pain, history of gastritis and H. pylori. Patient finished multiple drug therapy for H. pylori, will check for resolution. Patient continues to have daily abdominal pain, associated with meals, no specific type of food cause of the pain. Patient does have a history of alcohol use, states that she has vodka every few days. This may be exacerbating gastritis but currently unable to account for all the pain due to this. Patient likely needs EGD. We will check lipase       Diagnosis Orders   1. Chronic epigastric pain  Lipase    [facility], [facility]   2. Helicobacter pylori gastritis  omeprazole (PRILOSEC) 40 MG delayed release capsule    H. Pylori Breath Test   3. Type 2 diabetes mellitus without complication, without long-term current use of insulin (HCC)  Microalbumin, Ur       Plan:  1. Will increase omeprazole to 40 mg twice daily  2. Referral to a different GI group for second opinion  3. We will check H. pylori breath test       Review of Systems   Constitutional: Negative for fever. Respiratory: Negative for shortness of breath. Cardiovascular: Negative for chest pain. Gastrointestinal: Positive for abdominal pain and diarrhea. Negative for constipation, nausea and vomiting. Genitourinary: Negative for difficulty urinating and dysuria. Neurological: Negative for dizziness, light-headedness and headaches. Vitals:    05/19/22 0905   BP: 115/76   Pulse: 96   Temp: 97.7 °F (36.5 °C)             Physical Exam  Vitals reviewed. Constitutional:       General: She is not in acute distress. Eyes:      Extraocular Movements: Extraocular movements intact.       Conjunctiva/sclera: Conjunctivae normal.   Cardiovascular:      Rate and Rhythm: Normal rate and regular rhythm. Pulses: Normal pulses. Heart sounds: Normal heart sounds. Pulmonary:      Effort: Pulmonary effort is normal.      Breath sounds: Normal breath sounds. Abdominal:      General: There is no distension. Palpations: Abdomen is soft. Tenderness: There is abdominal tenderness. There is no guarding. Comments: Epigastric tenderness to palpation, no masses or hernias palpated   Neurological:      General: No focal deficit present. Mental Status: She is alert. Return in about 6 weeks (around 6/30/2022). Jayson Lebron MD  Family Medicine PGY-3  05/19/22 at 4:17 PM

## 2022-05-19 NOTE — PATIENT INSTRUCTIONS
Thank you for letting us take care of you today. We hope all your questions were addressed. If a question was overlooked or something else comes to mind after you return home, please contact a member of your Care Team listed below. Your Care Team at [place] [address] is Team #2  Paco Talley DO (Faculty)  Edie Delgado (Faculty)  Yudelka Levine MD (Resident)  Mykel Munoz MD (Resident)  Soha Gardiner MD (Resident)  Chaka Yates MD (Resident)  Markell Robertson., RMA Celine Primrose.,  AMADO Mcqueen., [facility] office)  Bryan Saldivar, [address] (Clinical Practice Manager)  Kavon Mcfarland [facility] (Clinical Pharmacist)     Office phone number: [phone]    If you need to get in right away due to illness, please be advised we have \"Same Day\" appointments available Monday-Friday. Please call us at [phone] option #3 to schedule your \"Same Day\" appointment.

## 2022-05-23 NOTE — PROGRESS NOTES
I have reviewed and discussed key elements of [address] 33 with the resident including plan of care and follow up and agree with the care reed plan. Past Medical History:   Diagnosis Date    Anxiety     Arthritis     knees and hands    Class 2 obesity due to excess calories with body mass index (BMI) of 35.0 to 35.9 in adult 10/4/2018    Depression     Diabetes mellitus ([facility] [address]     DIET CONTROLLED/ PT DENIES Currently    Gastroesophageal reflux disease 2/10/2022    H/O tubal ligation 2004    Headache     High cholesterol     Osteoarthritis         Diagnosis Orders   1. Chronic epigastric pain  Lipase    [facility], [place]   2. Helicobacter pylori gastritis  omeprazole (PRILOSEC) 40 MG delayed release capsule    H. Pylori Breath Test   3.  Type 2 diabetes mellitus without complication, without long-term current use of insulin (HCC)  Microalbumin, Ur

## 2022-05-24 ENCOUNTER — HOSPITAL ENCOUNTER (OUTPATIENT)
Age: 42
Setting detail: SPECIMEN
Discharge: HOME | End: 2022-05-24

## 2022-05-24 DIAGNOSIS — B96.81 HELICOBACTER PYLORI GASTRITIS: ICD-10-CM

## 2022-05-24 DIAGNOSIS — E11.9 TYPE 2 DIABETES MELLITUS WITHOUT COMPLICATION, WITHOUT LONG-TERM CURRENT USE OF INSULIN (HCC): ICD-10-CM

## 2022-05-24 DIAGNOSIS — R10.13 CHRONIC EPIGASTRIC PAIN: ICD-10-CM

## 2022-05-24 DIAGNOSIS — G89.29 CHRONIC EPIGASTRIC PAIN: ICD-10-CM

## 2022-05-24 DIAGNOSIS — K29.70 HELICOBACTER PYLORI GASTRITIS: ICD-10-CM

## 2022-05-24 LAB
CREATININE URINE: 144.7 MG/DL (ref 28–217)
LIPASE: 61 U/L (ref 13–60)
MICROALBUMIN/CREAT 24H UR: 57 MG/L
MICROALBUMIN/CREAT UR-RTO: 39 MCG/MG CREAT

## 2022-05-26 LAB — H PYLORI BREATH TEST: NEGATIVE

## 2022-06-11 DIAGNOSIS — R10.13 EPIGASTRIC PAIN: ICD-10-CM

## 2022-06-13 RX ORDER — DICYCLOMINE HCL 20 MG
TABLET ORAL
Qty: 120 TABLET | Refills: 1 | Status: SHIPPED | OUTPATIENT
Start: 2022-06-13 | End: 2022-08-09 | Stop reason: SDUPTHER

## 2022-06-13 NOTE — TELEPHONE ENCOUNTER
E-scribe request for med refills. Please review and e-scribe if applicable. Last Visit Date:  5/19/22  Next Visit Date:  Visit date not found    Hemoglobin A1C (%)   Date Value   03/10/2022 5.7   07/16/2021 5.2   10/07/2020 5.5             ( goal A1C is < 7)   Microalb/Crt.  Ratio (mcg/mg creat)   Date Value   05/24/2022 39 (H)     LDL Cholesterol (mg/dL)   Date Value   02/10/2022            (goal LDL is <100)   AST (U/L)   Date Value   10/25/2021 123 (H)     ALT (U/L)   Date Value   02/10/2022 43 (H)     BUN (mg/dL)   Date Value   10/25/2021 15     BP Readings from Last 3 Encounters:   05/19/22 115/76   04/15/22 134/83   04/07/22 116/78          (goal 120/80)        Patient Active Problem List:     New persistent daily headache     Abnormal CT scan, head     Frequent headaches     Migraine without status migrainosus, not intractable     Class 2 obesity due to excess calories with body mass index (BMI) of 35.0 to 35.9 in adult     Neck muscle spasm     Encounter for smoking cessation counseling     Gout tophi     Essential hypertension     Type 2 diabetes mellitus without complication, without long-term current use of insulin (HCC)     Gastroesophageal reflux disease     Hypertriglyceridemia     Epigastric pain     Encounter for HCV screening test for high risk patient     Helicobacter pylori gastritis      ----Asif Reed

## 2022-06-14 DIAGNOSIS — E78.1 HYPERTRIGLYCERIDEMIA: ICD-10-CM

## 2022-06-14 RX ORDER — FENOFIBRATE 48 MG/1
TABLET, COATED ORAL
Qty: 30 TABLET | Refills: 2 | Status: SHIPPED | OUTPATIENT
Start: 2022-06-14 | End: 2022-09-19 | Stop reason: SDUPTHER

## 2022-06-14 NOTE — TELEPHONE ENCOUNTER
Please address the medication refill and close the encounter. If I can be of assistance, please route to the applicable pool. Thank you. Last visit: 5-19-22  Last Med refill: 3-16-22  Does patient have enough medication for 72 hours: No:     Next Visit Date:  Future Appointments   Date Time Provider Mallory Hubbard   8/16/2022  1:15 PM Denisa Veloz MD [facility] [address] [facility]   Topic Date Due    Pneumococcal 0-64 years Vaccine (1 - PCV) Never done    Diabetic retinal exam  Never done    Cervical cancer screen  04/26/2021    Diabetic foot exam  03/10/2023 (Originally 10/7/2021)    Hepatitis B vaccine (3 of 3 - Risk 3-dose series) 08/07/2022    Flu vaccine (Season Ended) 09/01/2022    Lipids  02/10/2023    A1C test (Diabetic or Prediabetic)  03/10/2023    Depression Screen  05/19/2023    Diabetic microalbuminuria test  05/24/2023    DTaP/Tdap/Td vaccine (2 - Td or Tdap) 03/23/2027    Hepatitis A vaccine  Completed    COVID-19 Vaccine  Completed    Hepatitis C screen  Completed    HIV screen  Completed    Hib vaccine  Aged Out    Meningococcal (ACWY) vaccine  Aged Out    Varicella vaccine  Discontinued       Hemoglobin A1C (%)   Date Value   03/10/2022 5.7   07/16/2021 5.2   10/07/2020 5.5             ( goal A1C is < 7)   Microalb/Crt.  Ratio (mcg/mg creat)   Date Value   05/24/2022 39 (H)     LDL Cholesterol (mg/dL)   Date Value   02/10/2022        10/20/2020            (goal LDL is <100)   AST (U/L)   Date Value   10/25/2021 123 (H)     ALT (U/L)   Date Value   02/10/2022 43 (H)     BUN (mg/dL)   Date Value   10/25/2021 15     BP Readings from Last 3 Encounters:   05/19/22 115/76   04/15/22 134/83   04/07/22 116/78          (goal 120/80)    All Future Testing planned in [facility]  Lab Frequency Next Occurrence   Microalbumin, Ur Once 03/10/2023               Patient Active Problem List:     New persistent daily headache     Abnormal CT scan, head     Frequent headaches     Migraine without status migrainosus, not intractable     Class 2 obesity due to excess calories with body mass index (BMI) of 35.0 to 35.9 in adult     Neck muscle spasm     Encounter for smoking cessation counseling     Gout tophi     Essential hypertension     Type 2 diabetes mellitus without complication, without long-term current use of insulin (HCC)     Gastroesophageal reflux disease     Hypertriglyceridemia     Epigastric pain     Encounter for HCV screening test for high risk patient     Helicobacter pylori gastritis

## 2022-07-07 DIAGNOSIS — K29.70 HELICOBACTER PYLORI GASTRITIS: ICD-10-CM

## 2022-07-07 DIAGNOSIS — B96.81 HELICOBACTER PYLORI GASTRITIS: ICD-10-CM

## 2022-07-07 RX ORDER — OMEPRAZOLE 40 MG/1
CAPSULE, DELAYED RELEASE ORAL
Qty: 30 CAPSULE | Refills: 1 | Status: SHIPPED | OUTPATIENT
Start: 2022-07-07 | End: 2022-09-02 | Stop reason: SDUPTHER

## 2022-07-07 NOTE — TELEPHONE ENCOUNTER
E-scribe request for med refill. Please review and e-scribe if applicable. Last Visit Date:  05/19/2022  Next Visit Date:  Visit date not found    Hemoglobin A1C (%)   Date Value   03/10/2022 5.7   07/16/2021 5.2   10/07/2020 5.5             ( goal A1C is < 7)   Microalb/Crt.  Ratio (mcg/mg creat)   Date Value   05/24/2022 39 (H)     LDL Cholesterol (mg/dL)   Date Value   02/10/2022            (goal LDL is <100)   AST (U/L)   Date Value   10/25/2021 123 (H)     ALT (U/L)   Date Value   02/10/2022 43 (H)     BUN (mg/dL)   Date Value   10/25/2021 15     BP Readings from Last 3 Encounters:   05/19/22 115/76   04/15/22 134/83   04/07/22 116/78          (goal 120/80)        Patient Active Problem List:     New persistent daily headache     Abnormal CT scan, head     Frequent headaches     Migraine without status migrainosus, not intractable     Class 2 obesity due to excess calories with body mass index (BMI) of 35.0 to 35.9 in adult     Neck muscle spasm     Encounter for smoking cessation counseling     Gout tophi     Essential hypertension     Type 2 diabetes mellitus without complication, without long-term current use of insulin (HCC)     Gastroesophageal reflux disease     Hypertriglyceridemia     Epigastric pain     Encounter for HCV screening test for high risk patient     Helicobacter pylori gastritis      ----Fede Caldwell

## 2022-07-08 DIAGNOSIS — R10.13 EPIGASTRIC PAIN: ICD-10-CM

## 2022-07-08 RX ORDER — SIMETHICONE 180 MG
CAPSULE ORAL
Qty: 90 CAPSULE | Refills: 1 | Status: SHIPPED | OUTPATIENT
Start: 2022-07-08 | End: 2022-09-08

## 2022-07-08 NOTE — TELEPHONE ENCOUNTER
E-scribe request for med refill. Please review and e-scribe if applicable. Last Visit Date:  05/19/2022  Next Visit Date:  Visit date not found    Hemoglobin A1C (%)   Date Value   03/10/2022 5.7   07/16/2021 5.2   10/07/2020 5.5             ( goal A1C is < 7)   Microalb/Crt.  Ratio (mcg/mg creat)   Date Value   05/24/2022 39 (H)     LDL Cholesterol (mg/dL)   Date Value   02/10/2022            (goal LDL is <100)   AST (U/L)   Date Value   10/25/2021 123 (H)     ALT (U/L)   Date Value   02/10/2022 43 (H)     BUN (mg/dL)   Date Value   10/25/2021 15     BP Readings from Last 3 Encounters:   05/19/22 115/76   04/15/22 134/83   04/07/22 116/78          (goal 120/80)        Patient Active Problem List:     New persistent daily headache     Abnormal CT scan, head     Frequent headaches     Migraine without status migrainosus, not intractable     Class 2 obesity due to excess calories with body mass index (BMI) of 35.0 to 35.9 in adult     Neck muscle spasm     Encounter for smoking cessation counseling     Gout tophi     Essential hypertension     Type 2 diabetes mellitus without complication, without long-term current use of insulin (HCC)     Gastroesophageal reflux disease     Hypertriglyceridemia     Epigastric pain     Encounter for HCV screening test for high risk patient     Helicobacter pylori gastritis      ----Sai John

## 2022-07-18 ENCOUNTER — TELEPHONE (OUTPATIENT)
Dept: FAMILY MEDICINE CLINIC | Age: 42
End: 2022-07-18

## 2022-07-18 DIAGNOSIS — G89.29 CHRONIC EPIGASTRIC PAIN: Primary | ICD-10-CM

## 2022-07-18 DIAGNOSIS — R10.13 CHRONIC EPIGASTRIC PAIN: Primary | ICD-10-CM

## 2022-07-18 NOTE — TELEPHONE ENCOUNTER
[facility] called and needs a referral faxed to them for her. Please fax to [phone].

## 2022-08-06 DIAGNOSIS — I10 ESSENTIAL HYPERTENSION: ICD-10-CM

## 2022-08-08 RX ORDER — ALLOPURINOL 300 MG/1
TABLET ORAL
Qty: 30 TABLET | Refills: 0 | Status: SHIPPED | OUTPATIENT
Start: 2022-08-08 | End: 2022-09-06 | Stop reason: SDUPTHER

## 2022-08-08 RX ORDER — AMLODIPINE BESYLATE 2.5 MG/1
TABLET ORAL
Qty: 90 TABLET | Refills: 1 | Status: SHIPPED | OUTPATIENT
Start: 2022-08-08 | End: 2022-10-03 | Stop reason: SDUPTHER

## 2022-08-08 NOTE — TELEPHONE ENCOUNTER
Frequent headaches     Migraine without status migrainosus, not intractable     Class 2 obesity due to excess calories with body mass index (BMI) of 35.0 to 35.9 in adult     Neck muscle spasm     Encounter for smoking cessation counseling     Gout tophi     Essential hypertension     Type 2 diabetes mellitus without complication, without long-term current use of insulin (HCC)     Gastroesophageal reflux disease     Hypertriglyceridemia     Epigastric pain     Encounter for HCV screening test for high risk patient     Helicobacter pylori gastritis

## 2022-08-09 DIAGNOSIS — R10.13 EPIGASTRIC PAIN: ICD-10-CM

## 2022-08-09 RX ORDER — DICYCLOMINE HCL 20 MG
TABLET ORAL
Qty: 120 TABLET | Refills: 1 | Status: SHIPPED | OUTPATIENT
Start: 2022-08-09 | End: 2022-09-08

## 2022-08-09 NOTE — TELEPHONE ENCOUNTER
Last visit: 05/19/22  Last Med refill: 07/11/22  Does patient have enough medication for 72 hours: No:     Next Visit Date:  Future Appointments   Date Time Provider Mallory Haydee   8/16/2022  1:15 PM Mikala Stephenson MD [facility] [address] Maintenance   Topic Date Due    Pneumococcal 0-64 years Vaccine (1 - PCV) Never done    Diabetic retinal exam  Never done    Cervical cancer screen  04/26/2021    Hepatitis B vaccine (3 of 3 - Risk 3-dose series) 08/07/2022    Diabetic foot exam  03/10/2023 (Originally 10/7/2021)    Flu vaccine (1) 09/01/2022    Lipids  02/10/2023    A1C test (Diabetic or Prediabetic)  03/10/2023    Depression Screen  05/19/2023    Diabetic microalbuminuria test  05/24/2023    DTaP/Tdap/Td vaccine (2 - Td or Tdap) 03/23/2027    Hepatitis A vaccine  Completed    COVID-19 Vaccine  Completed    Hepatitis C screen  Completed    HIV screen  Completed    Hib vaccine  Aged Out    Meningococcal (ACWY) vaccine  Aged Out    Varicella vaccine  Discontinued       Hemoglobin A1C (%)   Date Value   03/10/2022 5.7   07/16/2021 5.2   10/07/2020 5.5             ( goal A1C is < 7)   Microalb/Crt.  Ratio (mcg/mg creat)   Date Value   05/24/2022 39 (H)     LDL Cholesterol (mg/dL)   Date Value   02/10/2022        10/20/2020            (goal LDL is <100)   AST (U/L)   Date Value   10/25/2021 123 (H)     ALT (U/L)   Date Value   02/10/2022 43 (H)     BUN (mg/dL)   Date Value   10/25/2021 15     BP Readings from Last 3 Encounters:   05/19/22 115/76   04/15/22 134/83   04/07/22 116/78          (goal 120/80)    All Future Testing planned in [facility]  Lab Frequency Next Occurrence   Microalbumin, Ur Once 03/10/2023               Patient Active Problem List:     New persistent daily headache     Abnormal CT scan, head     Frequent headaches     Migraine without status migrainosus, not intractable     Class 2 obesity due to excess calories with body mass index (BMI) of 35.0 to 35.9 in adult     Neck muscle spasm     Encounter for smoking cessation counseling     Gout tophi     Essential hypertension     Type 2 diabetes mellitus without complication, without long-term current use of insulin (HCC)     Gastroesophageal reflux disease     Hypertriglyceridemia     Epigastric pain     Encounter for HCV screening test for high risk patient     Helicobacter pylori gastritis

## 2022-08-16 ENCOUNTER — OFFICE VISIT (OUTPATIENT)
Dept: GASTROENTEROLOGY | Age: 42
End: 2022-08-16
Payer: MEDICARE

## 2022-08-16 VITALS
SYSTOLIC BLOOD PRESSURE: 138 MMHG | HEART RATE: 74 BPM | BODY MASS INDEX: 31.42 KG/M2 | WEIGHT: 219 LBS | DIASTOLIC BLOOD PRESSURE: 89 MMHG

## 2022-08-16 DIAGNOSIS — R10.11 ABDOMINAL PAIN, RIGHT UPPER QUADRANT: ICD-10-CM

## 2022-08-16 DIAGNOSIS — R16.0 HEPATOMEGALY: Primary | ICD-10-CM

## 2022-08-16 PROCEDURE — 99204 OFFICE O/P NEW MOD 45 MIN: CPT | Performed by: INTERNAL MEDICINE

## 2022-08-16 PROCEDURE — G8427 DOCREV CUR MEDS BY ELIG CLIN: HCPCS | Performed by: INTERNAL MEDICINE

## 2022-08-16 PROCEDURE — G8417 CALC BMI ABV UP PARAM F/U: HCPCS | Performed by: INTERNAL MEDICINE

## 2022-08-16 PROCEDURE — 1036F TOBACCO NON-USER: CPT | Performed by: INTERNAL MEDICINE

## 2022-08-16 ASSESSMENT — ENCOUNTER SYMPTOMS
ALLERGIC/IMMUNOLOGIC NEGATIVE: 1
NAUSEA: 1
DIARRHEA: 1
ABDOMINAL DISTENTION: 1
EYES NEGATIVE: 1
ABDOMINAL PAIN: 1
RESPIRATORY NEGATIVE: 1
VOMITING: 1

## 2022-09-02 DIAGNOSIS — B96.81 HELICOBACTER PYLORI GASTRITIS: ICD-10-CM

## 2022-09-02 DIAGNOSIS — K29.70 HELICOBACTER PYLORI GASTRITIS: ICD-10-CM

## 2022-09-02 RX ORDER — OMEPRAZOLE 40 MG/1
CAPSULE, DELAYED RELEASE ORAL
Qty: 30 CAPSULE | Refills: 1 | Status: SHIPPED | OUTPATIENT
Start: 2022-09-02 | End: 2022-10-03 | Stop reason: SDUPTHER

## 2022-09-02 NOTE — TELEPHONE ENCOUNTER
Last visit: 5/19/22  Last Med refill: 8/7/22  Does patient have enough medication for 72 hours: Yes    Next Visit Date:  Future Appointments   Date Time Provider Malloyr Haydee   9/8/2022  8:30 AM [facility] PAT RM 3 [facility]CZ PAT [facility]   10/3/2022  3:30 PM MD Jennifer Renner [facility]   10/8/2022  9:00 AM STA ULTRASOUND RM 1 STAZ [facility]   10/8/2022  9:30 AM STA ULTRASOUND RM 1 ST[facility] US [facility]   10/17/2022  3:00 PM Ebcristo Lew MD [facility] [address] Maintenance   Topic Date Due    Pneumococcal 0-64 years Vaccine (1 - PCV) Never done    Diabetic retinal exam  Never done    Cervical cancer screen  04/26/2021    Hepatitis B vaccine (3 of 3 - Risk 3-dose series) 08/07/2022    Flu vaccine (1) 09/01/2022    Diabetic foot exam  03/10/2023 (Originally 10/7/2021)    Lipids  02/10/2023    A1C test (Diabetic or Prediabetic)  03/10/2023    Depression Screen  05/19/2023    Diabetic microalbuminuria test  05/24/2023    DTaP/Tdap/Td vaccine (2 - Td or Tdap) 03/23/2027    Hepatitis A vaccine  Completed    COVID-19 Vaccine  Completed    Hepatitis C screen  Completed    HIV screen  Completed    Hib vaccine  Aged Out    Meningococcal (ACWY) vaccine  Aged Out    Varicella vaccine  Discontinued       Hemoglobin A1C (%)   Date Value   03/10/2022 5.7   07/16/2021 5.2   10/07/2020 5.5             ( goal A1C is < 7)   Microalb/Crt.  Ratio (mcg/mg creat)   Date Value   05/24/2022 39 (H)     LDL Cholesterol (mg/dL)   Date Value   02/10/2022        10/20/2020            (goal LDL is <100)   AST (U/L)   Date Value   10/25/2021 123 (H)     ALT (U/L)   Date Value   02/10/2022 43 (H)     BUN (mg/dL)   Date Value   10/25/2021 15     BP Readings from Last 3 Encounters:   08/16/22 138/89   05/19/22 115/76   04/15/22 134/83          (goal 120/80)    All Future Testing planned in [facility]  Lab Frequency Next Occurrence   Microalbumin, Ur Once 03/10/2023   EGD Once 11/16/2022   US LIVER Once 11/16/2022   US GALLBLADDER RUQ Once 11/16/2022               Patient Active Problem List:     New persistent daily headache     Abnormal CT scan, head     Frequent headaches     Migraine without status migrainosus, not intractable     Class 2 obesity due to excess calories with body mass index (BMI) of 35.0 to 35.9 in adult     Neck muscle spasm     Encounter for smoking cessation counseling     Gout tophi     Essential hypertension     Type 2 diabetes mellitus without complication, without long-term current use of insulin (HCC)     Gastroesophageal reflux disease     Hypertriglyceridemia     Epigastric pain     Encounter for HCV screening test for high risk patient     Helicobacter pylori gastritis

## 2022-09-04 DIAGNOSIS — E78.5 HYPERLIPIDEMIA, UNSPECIFIED HYPERLIPIDEMIA TYPE: ICD-10-CM

## 2022-09-06 RX ORDER — ALLOPURINOL 300 MG/1
TABLET ORAL
Qty: 30 TABLET | Refills: 0 | Status: SHIPPED | OUTPATIENT
Start: 2022-09-06 | End: 2022-09-08

## 2022-09-06 RX ORDER — ATORVASTATIN CALCIUM 40 MG/1
TABLET, FILM COATED ORAL
Qty: 30 TABLET | Refills: 1 | Status: SHIPPED | OUTPATIENT
Start: 2022-09-06 | End: 2022-10-03 | Stop reason: SDUPTHER

## 2022-09-06 NOTE — TELEPHONE ENCOUNTER
Last visit: 5/19/22  Last Med refill: 8/8/22  Does patient have enough medication for 72 hours: Yes    Next Visit Date:  Future Appointments   Date Time Provider Mallory Haydee   9/8/2022  8:30 AM ST[facility] PAT RM 3 STCZ PAT [facility]   10/3/2022  3:30 PM MD Jennifer Dickerson [facility]   10/8/2022  9:00 AM STA ULTRASOUND RM 1 STAZ US [facility] Radiolog   10/8/2022  9:30 AM STA ULTRASOUND RM 1 STAZ US [facility] Radiolog   10/17/2022  3:00 PM Darin Murcia MD [facility] [address] Maintenance   Topic Date Due    Pneumococcal 0-64 years Vaccine (1 - PCV) Never done    Diabetic retinal exam  Never done    Cervical cancer screen  04/26/2021    Hepatitis B vaccine (3 of 3 - Risk 3-dose series) 08/07/2022    Flu vaccine (1) 09/01/2022    Diabetic foot exam  03/10/2023 (Originally 10/7/2021)    Lipids  02/10/2023    A1C test (Diabetic or Prediabetic)  03/10/2023    Depression Screen  05/19/2023    Diabetic microalbuminuria test  05/24/2023    DTaP/Tdap/Td vaccine (2 - Td or Tdap) 03/23/2027    Hepatitis A vaccine  Completed    COVID-19 Vaccine  Completed    Hepatitis C screen  Completed    HIV screen  Completed    Hib vaccine  Aged Out    Meningococcal (ACWY) vaccine  Aged Out    Varicella vaccine  Discontinued       Hemoglobin A1C (%)   Date Value   03/10/2022 5.7   07/16/2021 5.2   10/07/2020 5.5             ( goal A1C is < 7)   Microalb/Crt.  Ratio (mcg/mg creat)   Date Value   05/24/2022 39 (H)     LDL Cholesterol (mg/dL)   Date Value   02/10/2022        10/20/2020            (goal LDL is <100)   AST (U/L)   Date Value   10/25/2021 123 (H)     ALT (U/L)   Date Value   02/10/2022 43 (H)     BUN (mg/dL)   Date Value   10/25/2021 15     BP Readings from Last 3 Encounters:   08/16/22 138/89   05/19/22 115/76   04/15/22 134/83          (goal 120/80)    All Future Testing planned in [facility]  Lab Frequency Next Occurrence   Microalbumin, Ur Once 03/10/2023   EGD Once 11/16/2022   US LIVER Once 11/16/2022   US GALLBLADDER RUQ Once 11/16/2022               Patient Active Problem List:     New persistent daily headache     Abnormal CT scan, head     Frequent headaches     Migraine without status migrainosus, not intractable     Class 2 obesity due to excess calories with body mass index (BMI) of 35.0 to 35.9 in adult     Neck muscle spasm     Encounter for smoking cessation counseling     Gout tophi     Essential hypertension     Type 2 diabetes mellitus without complication, without long-term current use of insulin (HCC)     Gastroesophageal reflux disease     Hypertriglyceridemia     Epigastric pain     Encounter for HCV screening test for high risk patient     Helicobacter pylori gastritis

## 2022-09-06 NOTE — TELEPHONE ENCOUNTER
Last visit: 05/19/22  Last Med refill: 02/10/22  Does patient have enough medication for 72 hours: No:     Next Visit Date:  Future Appointments   Date Time Provider Mallory Haydee   9/8/2022  8:30 AM STCZ PAT RM 3 STCZ PAT [facility]   10/3/2022  3:30 PM MD Jennifer Isaac [facility]   10/8/2022  9:00 AM STA ULTRASOUND RM 1 STAZ US [facility] Radiolog   10/8/2022  9:30 AM STA ULTRASOUND RM 1 STAZ US [facility] Rad[facility]   10/17/2022  3:00 PM Gavi Forrester MD [facility] [address] Maintenance   Topic Date Due    Pneumococcal 0-64 years Vaccine (1 - PCV) Never done    Diabetic retinal exam  Never done    Cervical cancer screen  04/26/2021    Hepatitis B vaccine (3 of 3 - Risk 3-dose series) 08/07/2022    Flu vaccine (1) 09/01/2022    Diabetic foot exam  03/10/2023 (Originally 10/7/2021)    Lipids  02/10/2023    A1C test (Diabetic or Prediabetic)  03/10/2023    Depression Screen  05/19/2023    Diabetic microalbuminuria test  05/24/2023    DTaP/Tdap/Td vaccine (2 - Td or Tdap) 03/23/2027    Hepatitis A vaccine  Completed    COVID-19 Vaccine  Completed    Hepatitis C screen  Completed    HIV screen  Completed    Hib vaccine  Aged Out    Meningococcal (ACWY) vaccine  Aged Out    Varicella vaccine  Discontinued       Hemoglobin A1C (%)   Date Value   03/10/2022 5.7   07/16/2021 5.2   10/07/2020 5.5             ( goal A1C is < 7)   Microalb/Crt.  Ratio (mcg/mg creat)   Date Value   05/24/2022 39 (H)     LDL Cholesterol (mg/dL)   Date Value   02/10/2022        10/20/2020            (goal LDL is <100)   AST (U/L)   Date Value   10/25/2021 123 (H)     ALT (U/L)   Date Value   02/10/2022 43 (H)     BUN (mg/dL)   Date Value   10/25/2021 15     BP Readings from Last 3 Encounters:   08/16/22 138/89   05/19/22 115/76   04/15/22 134/83          (goal 120/80)    All Future Testing planned in [facility]  Lab Frequency Next Occurrence   Microalbumin, Ur Once 03/10/2023   EGD Once 11/16/2022   US LIVER Once 11/16/2022   US GALLBLADDER RUQ Once 11/16/2022               Patient Active Problem List:     New persistent daily headache     Abnormal CT scan, head     Frequent headaches     Migraine without status migrainosus, not intractable     Class 2 obesity due to excess calories with body mass index (BMI) of 35.0 to 35.9 in adult     Neck muscle spasm     Encounter for smoking cessation counseling     Gout tophi     Essential hypertension     Type 2 diabetes mellitus without complication, without long-term current use of insulin (HCC)     Gastroesophageal reflux disease     Hypertriglyceridemia     Epigastric pain     Encounter for HCV screening test for high risk patient     Helicobacter pylori gastritis

## 2022-09-08 ENCOUNTER — HOSPITAL ENCOUNTER (OUTPATIENT)
Dept: PREADMISSION TESTING | Age: 42
Discharge: HOME | End: 2022-09-12

## 2022-09-08 VITALS — BODY MASS INDEX: 30.06 KG/M2 | WEIGHT: 210 LBS | HEIGHT: 70 IN

## 2022-09-08 NOTE — PROGRESS NOTES

## 2022-09-19 DIAGNOSIS — E78.1 HYPERTRIGLYCERIDEMIA: ICD-10-CM

## 2022-09-19 RX ORDER — FENOFIBRATE 48 MG/1
TABLET, COATED ORAL
Qty: 30 TABLET | Refills: 5 | Status: SHIPPED | OUTPATIENT
Start: 2022-09-19 | End: 2022-10-03 | Stop reason: SDUPTHER

## 2022-09-19 NOTE — TELEPHONE ENCOUNTER
E-scribe request for med refill. Please review and e-scribe if applicable. Last Visit Date:  05/19/2022  Next Visit Date:  10/3/2022    Hemoglobin A1C (%)   Date Value   03/10/2022 5.7   07/16/2021 5.2   10/07/2020 5.5             ( goal A1C is < 7)   Microalb/Crt.  Ratio (mcg/mg creat)   Date Value   05/24/2022 39 (H)     LDL Cholesterol (mg/dL)   Date Value   02/10/2022            (goal LDL is <100)   AST (U/L)   Date Value   10/25/2021 123 (H)     ALT (U/L)   Date Value   02/10/2022 43 (H)     BUN (mg/dL)   Date Value   10/25/2021 15     BP Readings from Last 3 Encounters:   08/16/22 138/89   05/19/22 115/76   04/15/22 134/83          (goal 120/80)        Patient Active Problem List:     New persistent daily headache     Abnormal CT scan, head     Frequent headaches     Migraine without status migrainosus, not intractable     Class 2 obesity due to excess calories with body mass index (BMI) of 35.0 to 35.9 in adult     Neck muscle spasm     Encounter for smoking cessation counseling     Gout tophi     Essential hypertension     Type 2 diabetes mellitus without complication, without long-term current use of insulin (HCC)     Gastroesophageal reflux disease     Hypertriglyceridemia     Epigastric pain     Encounter for HCV screening test for high risk patient     Helicobacter pylori gastritis      ----Marian Howard

## 2022-09-20 ENCOUNTER — ANESTHESIA EVENT (OUTPATIENT)
Dept: ENDOSCOPY | Age: 42
End: 2022-09-20
Payer: COMMERCIAL

## 2022-09-21 ENCOUNTER — HOSPITAL ENCOUNTER (OUTPATIENT)
Age: 42
Setting detail: OUTPATIENT SURGERY
Discharge: HOME | End: 2022-09-21
Attending: INTERNAL MEDICINE | Admitting: INTERNAL MEDICINE
Payer: COMMERCIAL

## 2022-09-21 ENCOUNTER — ANESTHESIA (OUTPATIENT)
Dept: ENDOSCOPY | Age: 42
End: 2022-09-21
Payer: COMMERCIAL

## 2022-09-21 VITALS
TEMPERATURE: 98.6 F | WEIGHT: 210 LBS | HEIGHT: 70 IN | HEART RATE: 98 BPM | OXYGEN SATURATION: 94 % | DIASTOLIC BLOOD PRESSURE: 78 MMHG | RESPIRATION RATE: 20 BRPM | BODY MASS INDEX: 30.06 KG/M2 | SYSTOLIC BLOOD PRESSURE: 117 MMHG

## 2022-09-21 DIAGNOSIS — R10.11 ABDOMINAL PAIN, RIGHT UPPER QUADRANT: ICD-10-CM

## 2022-09-21 LAB — HCG, PREGNANCY URINE (POC): NEGATIVE

## 2022-09-21 PROCEDURE — 3700000000 HC ANESTHESIA ATTENDED CARE: Performed by: INTERNAL MEDICINE

## 2022-09-21 PROCEDURE — 3609012400 HC EGD TRANSORAL BIOPSY SINGLE/MULTIPLE: Performed by: INTERNAL MEDICINE

## 2022-09-21 PROCEDURE — 6360000002 HC RX W HCPCS: Performed by: NURSE ANESTHETIST, CERTIFIED REGISTERED

## 2022-09-21 PROCEDURE — 7100000011 HC PHASE II RECOVERY - ADDTL 15 MIN: Performed by: INTERNAL MEDICINE

## 2022-09-21 PROCEDURE — 2709999900 HC NON-CHARGEABLE SUPPLY: Performed by: INTERNAL MEDICINE

## 2022-09-21 PROCEDURE — 88305 TISSUE EXAM BY PATHOLOGIST: CPT

## 2022-09-21 PROCEDURE — 7100000010 HC PHASE II RECOVERY - FIRST 15 MIN: Performed by: INTERNAL MEDICINE

## 2022-09-21 PROCEDURE — 2580000003 HC RX 258: Performed by: ANESTHESIOLOGY

## 2022-09-21 PROCEDURE — 81025 URINE PREGNANCY TEST: CPT

## 2022-09-21 PROCEDURE — 43239 EGD BIOPSY SINGLE/MULTIPLE: CPT | Performed by: INTERNAL MEDICINE

## 2022-09-21 PROCEDURE — 88342 IMHCHEM/IMCYTCHM 1ST ANTB: CPT

## 2022-09-21 PROCEDURE — 3700000001 HC ADD 15 MINUTES (ANESTHESIA): Performed by: INTERNAL MEDICINE

## 2022-09-21 RX ORDER — SODIUM CHLORIDE 9 MG/ML
INJECTION, SOLUTION INTRAVENOUS PRN
Status: CANCELLED | OUTPATIENT
Start: 2022-09-21

## 2022-09-21 RX ORDER — METOCLOPRAMIDE HYDROCHLORIDE 5 MG/ML
10 INJECTION INTRAMUSCULAR; INTRAVENOUS
Status: CANCELLED | OUTPATIENT
Start: 2022-09-21 | End: 2022-09-21

## 2022-09-21 RX ORDER — DIPHENHYDRAMINE HYDROCHLORIDE 50 MG/ML
12.5 INJECTION INTRAMUSCULAR; INTRAVENOUS
Status: CANCELLED | OUTPATIENT
Start: 2022-09-21 | End: 2022-09-21

## 2022-09-21 RX ORDER — SODIUM CHLORIDE 0.9 % (FLUSH) 0.9 %
5-40 SYRINGE (ML) INJECTION EVERY 12 HOURS SCHEDULED
Status: CANCELLED | OUTPATIENT
Start: 2022-09-21

## 2022-09-21 RX ORDER — SODIUM CHLORIDE 9 MG/ML
INJECTION, SOLUTION INTRAVENOUS PRN
Status: DISCONTINUED | OUTPATIENT
Start: 2022-09-21 | End: 2022-09-21 | Stop reason: HOSPADM

## 2022-09-21 RX ORDER — SODIUM CHLORIDE 0.9 % (FLUSH) 0.9 %
5-40 SYRINGE (ML) INJECTION EVERY 12 HOURS SCHEDULED
Status: DISCONTINUED | OUTPATIENT
Start: 2022-09-21 | End: 2022-09-21 | Stop reason: HOSPADM

## 2022-09-21 RX ORDER — LIDOCAINE HYDROCHLORIDE 10 MG/ML
1 INJECTION, SOLUTION EPIDURAL; INFILTRATION; INTRACAUDAL; PERINEURAL
Status: DISCONTINUED | OUTPATIENT
Start: 2022-09-21 | End: 2022-09-21 | Stop reason: HOSPADM

## 2022-09-21 RX ORDER — PROPOFOL 10 MG/ML
INJECTION, EMULSION INTRAVENOUS PRN
Status: DISCONTINUED | OUTPATIENT
Start: 2022-09-21 | End: 2022-09-21 | Stop reason: SDUPTHER

## 2022-09-21 RX ORDER — LABETALOL HYDROCHLORIDE 5 MG/ML
10 INJECTION, SOLUTION INTRAVENOUS
Status: CANCELLED | OUTPATIENT
Start: 2022-09-21

## 2022-09-21 RX ORDER — SODIUM CHLORIDE 0.9 % (FLUSH) 0.9 %
5-40 SYRINGE (ML) INJECTION PRN
Status: CANCELLED | OUTPATIENT
Start: 2022-09-21

## 2022-09-21 RX ORDER — HYDRALAZINE HYDROCHLORIDE 20 MG/ML
10 INJECTION INTRAMUSCULAR; INTRAVENOUS
Status: CANCELLED | OUTPATIENT
Start: 2022-09-21

## 2022-09-21 RX ORDER — SODIUM CHLORIDE 0.9 % (FLUSH) 0.9 %
5-40 SYRINGE (ML) INJECTION PRN
Status: DISCONTINUED | OUTPATIENT
Start: 2022-09-21 | End: 2022-09-21 | Stop reason: HOSPADM

## 2022-09-21 RX ORDER — SODIUM CHLORIDE 9 MG/ML
INJECTION, SOLUTION INTRAVENOUS CONTINUOUS
Status: DISCONTINUED | OUTPATIENT
Start: 2022-09-21 | End: 2022-09-21 | Stop reason: HOSPADM

## 2022-09-21 RX ORDER — FENTANYL CITRATE 50 UG/ML
25 INJECTION, SOLUTION INTRAMUSCULAR; INTRAVENOUS EVERY 5 MIN PRN
Status: CANCELLED | OUTPATIENT
Start: 2022-09-21

## 2022-09-21 RX ORDER — ONDANSETRON 2 MG/ML
4 INJECTION INTRAMUSCULAR; INTRAVENOUS
Status: CANCELLED | OUTPATIENT
Start: 2022-09-21 | End: 2022-09-21

## 2022-09-21 RX ORDER — MEPERIDINE HYDROCHLORIDE 25 MG/ML
12.5 INJECTION INTRAMUSCULAR; INTRAVENOUS; SUBCUTANEOUS EVERY 5 MIN PRN
Status: CANCELLED | OUTPATIENT
Start: 2022-09-21

## 2022-09-21 RX ADMIN — PROPOFOL 50 MG: 10 INJECTION, EMULSION INTRAVENOUS at 09:26

## 2022-09-21 RX ADMIN — PROPOFOL 100 MG: 10 INJECTION, EMULSION INTRAVENOUS at 09:31

## 2022-09-21 RX ADMIN — PROPOFOL 100 MG: 10 INJECTION, EMULSION INTRAVENOUS at 09:29

## 2022-09-21 RX ADMIN — SODIUM CHLORIDE: 9 INJECTION, SOLUTION INTRAVENOUS at 08:30

## 2022-09-21 RX ADMIN — PROPOFOL 100 MG: 10 INJECTION, EMULSION INTRAVENOUS at 09:27

## 2022-09-21 ASSESSMENT — ENCOUNTER SYMPTOMS
SHORTNESS OF BREATH: 0
SHORTNESS OF BREATH: 0
WHEEZING: 0
BACK PAIN: 1
COUGH: 0
SORE THROAT: 0
STRIDOR: 0

## 2022-09-21 ASSESSMENT — PAIN - FUNCTIONAL ASSESSMENT
PAIN_FUNCTIONAL_ASSESSMENT: ACTIVITIES ARE NOT PREVENTED
PAIN_FUNCTIONAL_ASSESSMENT: 0-10

## 2022-09-21 ASSESSMENT — PAIN DESCRIPTION - DESCRIPTORS: DESCRIPTORS: PRESSURE;HEAVINESS

## 2022-09-21 ASSESSMENT — LIFESTYLE VARIABLES: SMOKING_STATUS: 0

## 2022-09-21 NOTE — ANESTHESIA POSTPROCEDURE EVALUATION
POST- ANESTHESIA EVALUATION       Pt Name: Leonardo Butterfield  MRN: [id number]  [place]: 1980  Date of evaluation: 9/21/2022  Time:  3:45 PM      /78   Pulse 98   Temp 98.6 °F (37 °C)   Resp 20   Ht 5' 10\" (1.778 m)   Wt 210 lb (95.3 kg)   SpO2 94%   BMI 30.13 kg/m²      Consciousness Level  Awake  Cardiopulmonary Status  Stable  Pain Adequately Treated YES  Nausea / Vomiting  NO  Adequate Hydration  YES  Anesthesia Related Complications NONE      Electronically signed by Rocío Miles MD on 9/21/2022 at 3:45 PM       Department of Anesthesiology  Postprocedure Note    Patient: Leonardo Butterfield  MRN: [id number]  YOB: 1980  Date of evaluation: 9/21/2022      Procedure Summary     Date: 09/21/22 Room / Location: [place] [id number] / [facility]    Anesthesia Start: 0913 Anesthesia Stop: 4964    Procedure: EGD BIOPSY (Esophagus) Diagnosis:       Abdominal pain, right upper quadrant      (Abdominal pain, right upper quadrant [R10.11])    Surgeons: Javier Florentino MD Responsible Provider: Rocío Miles MD    Anesthesia Type: general ASA Status: 3          Anesthesia Type: No value filed.     Gabrielle Phase I:      Gabrielle Phase II: Gabrielle Score: 10      Anesthesia Post Evaluation

## 2022-09-21 NOTE — ANESTHESIA PRE PROCEDURE
Department of Anesthesiology  Preprocedure Note       Name:  Damian Jeong   Age:  43 y.o.  :  1980                                          MRN:  [id number]         Date:  2022      Surgeon: Lonna Frankel):  Va Noel MD    Procedure: Procedure(s):  EGD BIOPSY    Medications prior to admission:   Prior to Admission medications    Medication Sig Start Date End Date Taking? Authorizing Provider   fenofibrate (TRICOR) 48 MG tablet TAKE ONE TABLET BY MOUTH DAILY 22   Durga Chamorro MD   atorvastatin (LIPITOR) 40 MG tablet TAKE ONE TABLET BY MOUTH DAILY 22   Gay Castle MD   omeprazole (PRILOSEC) 40 MG delayed release capsule TAKE ONE CAPSULE BY MOUTH DAILY 22   Hermelindo Loving MD   amLODIPine (NORVASC) 2.5 MG tablet TAKE ONE TABLET BY MOUTH DAILY 22   Javi Wang MD       Current medications:    Current Facility-Administered Medications   Medication Dose Route Frequency Provider Last Rate Last Admin    lidocaine PF 1 % injection 1 mL  1 mL IntraDERmal Once PRN [place] MD Casimiro        sodium chloride flush 0.9 % injection 5-40 mL  5-40 mL IntraVENous 2 times per day Saulo Mccullough MD        sodium chloride flush 0.9 % injection 5-40 mL  5-40 mL IntraVENous PRN [place] Kirkpatrick MD        0.9 % sodium chloride infusion   IntraVENous PRN [place] Kirkpatrick MD        0.9 % sodium chloride infusion   IntraVENous Continuous Saulo Mccullough MD           Allergies:     Allergies   Allergen Reactions    Iodine Anaphylaxis    Shellfish-Derived Products Anaphylaxis       Problem List:    Patient Active Problem List   Diagnosis Code    New persistent daily headache G44.52    Abnormal CT scan, head R93.0    Frequent headaches R51.9    Migraine without status migrainosus, not intractable G43.909    Class 2 obesity due to excess calories with body mass index (BMI) of 35.0 to 35.9 in adult E66.09, Z68.35    Neck muscle spasm M62.838    Encounter for smoking cessation counseling Z71.6    Gout tophi M1A. 9XX1    Essential hypertension I10    Type 2 diabetes mellitus without complication, without long-term current use of insulin (HCC) E11.9    Gastroesophageal reflux disease K21.9    Hypertriglyceridemia E78.1    Epigastric pain R10.13    Encounter for HCV screening test for high risk patient I31.52, N67.23    Helicobacter pylori gastritis K29.70, B96.81       Past Medical History:        Diagnosis Date    Anxiety     Arthritis     knees and hands    Class 2 obesity due to excess calories with body mass index (BMI) of 35.0 to 35.9 in adult 10/4/2018    Depression     Diabetes mellitus ([facility] [address]     DIET CONTROLLED/ PT DENIES Currently    Gastroesophageal reflux disease 2/10/2022    H/O tubal ligation 2004    Headache     High cholesterol     Osteoarthritis        Past Surgical History:        Procedure Laterality Date    ARM SURGERY Left     FOOT NEUROMA SURGERY Left 8/15/2019    EXCISION LEFT HALLUX BENIGN SOFT TISSUE performed by Yaneth Marin DPM at [address] forearm with plate 9 years ago    HERNIA REPAIR      TOE SURGERY Left 08/15/2019    Excision of soft tissue mass, left hallux    TUBAL LIGATION  2004    US GUIDED LIVER BIOPSY PERCUTANEOUS  2/24/2022    US GUIDED LIVER BIOPSY PERCUTANEOUS 2/24/2022 [facility]    WISDOM TOOTH EXTRACTION Bilateral        Social History:    Social History     Tobacco Use    Smoking status: Former     Types: Cigarettes     Quit date: 10/2018     Years since quitting: 3.9    Smokeless tobacco: Never   Substance Use Topics    Alcohol use:  Yes     Alcohol/week: 0.0 standard drinks     Comment: social                                Counseling given: Not Answered      Vital Signs (Current):   Vitals:    09/21/22 0811   BP: 125/84   Pulse: 97   Resp: 16   Temp: 97.1 °F (36.2 °C)   TempSrc: Infrared   SpO2: 98%   Weight: 210 lb (95.3 kg)   Height: 5' 10\" (1.778 m) BP Readings from Last 3 Encounters:   09/21/22 125/84   08/16/22 138/89   05/19/22 115/76       NPO Status: Time of last liquid consumption: 2330                        Time of last solid consumption: 1900                        Date of last liquid consumption: 09/20/22                        Date of last solid food consumption: 09/20/22    BMI:   Wt Readings from Last 3 Encounters:   09/21/22 210 lb (95.3 kg)   09/08/22 210 lb (95.3 kg)   08/16/22 219 lb (99.3 kg)     Body mass index is 30.13 kg/m². CBC:   Lab Results   Component Value Date/Time    WBC 5.9 02/10/2022 03:44 PM    RBC 3.61 02/10/2022 03:44 PM    RBC 4.11 12/20/2011 02:30 PM    HGB 12.9 02/10/2022 03:44 PM    HCT 37.9 02/10/2022 03:44 PM    .0 02/10/2022 03:44 PM    RDW 14.5 02/10/2022 03:44 PM     02/24/2022 11:59 AM     12/20/2011 02:30 PM     LR    CMP:   Lab Results   Component Value Date/Time     10/25/2021 12:15 PM    K 4.0 10/25/2021 12:15 PM     10/25/2021 12:15 PM    CO2 19 10/25/2021 12:15 PM    BUN 15 10/25/2021 12:15 PM    CREATININE 0.47 02/10/2022 03:44 PM    GFRAA >60 02/10/2022 03:44 PM    LABGLOM >60 02/10/2022 03:44 PM    GLUCOSE 134 10/25/2021 12:15 PM    GLUCOSE 86 12/20/2011 02:30 PM    PROT 6.6 10/25/2021 12:15 PM    CALCIUM 8.1 10/25/2021 12:15 PM    BILITOT 1.04 10/25/2021 12:15 PM    ALKPHOS 87 10/25/2021 12:15 PM     10/25/2021 12:15 PM    ALT 43 02/10/2022 03:44 PM       POC Tests: No results for input(s): POCGLU, POCNA, POCK, POCCL, POCBUN, POCHEMO, POCHCT in the last 72 hours.     Coags:   Lab Results   Component Value Date/Time    PROTIME 16.2 02/24/2022 11:59 AM    INR 1.3 02/24/2022 11:59 AM       HCG (If Applicable):   Lab Results   Component Value Date    PREGTESTUR negative 03/06/2016    HCG NEGATIVE 12/02/2020        ABGs: No results found for: PHART, PO2ART, GTA1DVE, VPV6YDY, BEART, R1CPJTNM     Type & Screen (If Applicable):  No results found for: LABABO, [facility]    Drug/Infectious Status (If Applicable):  Lab Results   Component Value Date/Time    HEPCAB NONREACTIVE 02/10/2022 03:44 PM       COVID-19 Screening (If Applicable): No results found for: COVID19        Anesthesia Evaluation  Patient summary reviewed and Nursing notes reviewed no history of anesthetic complications:   Airway: Mallampati: II  TM distance: >3 FB   Neck ROM: full  Mouth opening: > = 3 FB   Dental: normal exam         Pulmonary:Negative Pulmonary ROS and normal exam  breath sounds clear to auscultation      (-) pneumonia, COPD, asthma, shortness of breath, recent URI, sleep apnea, rhonchi, wheezes, rales, stridor, not a current smoker and no decreased breath sounds          Patient did not smoke on day of surgery. Cardiovascular:  Exercise tolerance: good (>4 METS),   (+) hypertension: no interval change, hyperlipidemia    (-) pacemaker, valvular problems/murmurs, past MI, CAD, CABG/stent, dysrhythmias,  angina,  CHF, orthopnea, PND,  COON, murmur, weak pulses,  friction rub, systolic click, carotid bruit,  JVD, peripheral edema and no pulmonary hypertension    ECG reviewed  Rhythm: regular  Rate: normal           Beta Blocker:  Not on Beta Blocker         Neuro/Psych:   (+) headaches: migraine headaches, psychiatric history:   (-) seizures, neuromuscular disease, TIA, CVA and depression/anxiety            GI/Hepatic/Renal:   (+) GERD: no interval change,      (-) hiatal hernia, PUD, hepatitis, liver disease, no renal disease, bowel prep and no morbid obesity       Endo/Other:    (+) DiabetesType II DM, no interval change, , no malignancy/cancer. (-) hypothyroidism, hyperthyroidism, blood dyscrasia, arthritis, no electrolyte abnormalities, no malignancy/cancer               Abdominal:             Vascular: negative vascular ROS. - PVD, DVT and PE. Other Findings:           Anesthesia Plan      general     ASA 3       Induction: intravenous.     MIPS: Postoperative opioids intended and Prophylactic antiemetics administered. Anesthetic plan and risks discussed with patient. Plan discussed with CRNA.                     Cristian Alcocer MD   9/21/2022

## 2022-09-21 NOTE — DISCHARGE INSTRUCTIONS
To see in the office in the next 3 to 4 weeks  To have ultrasound done as ordered prior to next office visit    Sedation or General Anesthesia, Adult  Care After  Refer to this sheet in the next 24 hours. These instructions provide you with information on caring for yourself after your procedure. Your caregiver may also give you more specific instructions. Your treatment has been planned according to current medical practices, but problems sometimes occur. Call your caregiver if you have any problems or questions after your procedure. HOME CARE INSTRUCTIONS   Do not participate in any activities that require you to be alert or coordinated. Do not:  Drive. Swim. Ride a bicycle. Operate heavy machinery. Alie Dry. Use power tools. Climb ladders. Work at [employer]. Take a bath. Do not drink alcohol. Do not make any important decisions or sign legal documents. Stay with an adult. The first meal following your procedure should be light and small. Avoid solid foods if you feel sick to your stomach (nauseous) or if you throw up (vomit). Drink enough fluids to keep your urine clear or pale yellow. Only take your usual medicines or new medicines if your caregiver approves them. Only take over-the-counter or prescription medicines for pain, discomfort, or fever as directed by your caregiver. Keep all follow-up appointments as directed by your caregiver. SEEK IMMEDIATE MEDICAL CARE IF:   You are not feeling normal or behaving normally after 24 hours. You have persistent nausea and vomiting. You are unable to drink fluids or eat food. You have difficulty urinating. You have difficulty breathing or speaking. You have blue or gray skin. There is difficulty waking or you cannot be woken up. You have heavy bleeding, redness, or a lot of swelling where the sedative or anesthesia entered your skin (intravenous site). You have a rash. MAKE SURE YOU:  Understand these instructions. Will watch your condition.   Will get help right away if you are not doing well or get worse. Document Released: 12/18/2006 Document Revised: 06/18/2013 Document Reviewed: 04/17/2013  [facility] Patient Information ©2013 [employer]. EGD DISCHARGE INSTRUCTIONS    Activity:  Rest today. No driving, operating machinery, or making any important decisions today. May resume normal activity tomorrow. Diet:  Following EGD eat slowly, chew food well, cut food into small pieces-Avoid greasy, spicy, \"crunchy\" foods X 48 hours. Call your Doctor if you have any of the following:  -Passing blood rectally or vomiting blood (it may be red or black). -Persistent nausea/vomiting  -Severe abdominal or chest pain not relieved with passing gas  -Fever of 100 degrees or more  -Redness or swelling at the IV site  -Severe sore throat or neck pain          Learning About Gastric Polyps  What are gastric polyps? Gastric polyps are growths in the stomach. Most people who get them don't have any problems. The cause of most gastric polyps is not known. But some polyps grow in people who use stomach acid reducers called proton pump inhibitors (PPIs). People who have gastritis, ulcers, or an H. pylori infection in the stomach can sometimes get polyps. People who have a parent, brother, or sister with gastric polyps might have them too. Most gastric polyps aren't cancer. But a certain kind of polyp can turn into cancer. What are the symptoms? You may not know that you have gastric polyps. Most polyps don't lead to symptoms. Once in a while, larger polyps may cause bleeding, belly pain, or a blockage in the stomach. How are polyps diagnosed and treated? Most gastric polyps are found during an endoscopy (say \"hq-XIO-ioit-pee\") that is done for another health problem. Endoscopy is a test that uses a thin flexible tube to allow a doctor to look at the inside of your stomach.   Your doctor will treat your polyps based on what he or she sees during this test. If your doctor finds a polyp during the test, he or she may take it out. It will then be tested to make sure it isn't cancer. If your doctor finds H. pylori bacteria in your stomach, he or she will prescribe antibiotics. If you have been taking a PPI, the doctor may prescribe a different medicine instead. Sometimes the doctor may suggest another endoscopy to see how treatment is working. He or she may also suggest this to recheck certain kinds of polyps. The doctor may also suggest a colonoscopy to look for polyps in your colon. Follow-up care is a key part of your treatment and safety. Be sure to make and go to all appointments, and call your doctor if you are having problems. It's also a good idea to know your test results and keep a list of the medicines you take. Current as of: June 6, 2022               Content Version: 13.4  © 2006-2022 [employer]. Care instructions adapted under license by [facility] [address] If you have questions about a medical condition or this instruction, always ask your healthcare professional. [address] any warranty or liability for your use of this information.

## 2022-09-21 NOTE — OP NOTE
ESOPHAGOGASTRODUODENOSCOPY   ( EGD )  DATE OF PROCEDURE: 9/21/2022     SURGEON: Kim Cain MD    ASSISTANT: None    PREOPERATIVE DIAGNOSIS: Patient has epigastric discomfort nausea. Not getting better with conservative management. Procedure performed to evaluate upper GI lesions. POSTOPERATIVE DIAGNOSIS: Small polyp near the pylorus towards the antrum. OPERATION: Upper GI endoscopy with Biopsy    ANESTHESIA: MAC    ESTIMATED BLOOD LOSS: None    COMPLICATIONS: None. SPECIMENS:  Was Obtained: Biopsy of the polyp in the antrum close to the pylorus. HISTORY: The patient is a 43y.o. year old female with history of above preop diagnosis. I recommended esophagogastroduodenoscopy with possible biopsy and I explained the risk, benefits, expected outcome, and alternatives to the procedure. Risks included but are not limited to bleeding, infection, respiratory distress, hypotension, and perforation of the esophagus, stomach, or duodenum. Patient understands and is in agreement. PROCEDURE: The patient was given IV conscious sedation. The patient's SPO2 remained above 90% throughout the procedure. Cetacaine spray given. Patient placed in left lateral position. Olympus  videogastroscope was inserted orally under vision into the esophagus without difficulty and advanced into the stomach then through the pylorus up to the second part of duodenum. Findings:    Retropharyngeal area was grossly normal appearing    Esophagus: normal.  No signs of peptic esophagitis or hiatal hernia seen. Squamocolumnar junction is at about 36 cm from incisor teeth. Lower esophageal sphincter opens normally. Stomach:    Fundus and Cardia Examined in Retroflexed View: normal    Body: normal    Antrum: abnormal: 3 to 4 mm polyp in the antrum at the rim of pylorus. Multiple biopsies taken.     Duodenum:     Descending: normal    Bulb: normal    While withdrawing the scope the above findings were verified and the scope was removed. The patient has tolerated the procedure without unusual events. No lesions seen that can account for patient's symptoms.     Recommendations/Plan:   F/U Biopsies  F/U In Office as instructed  Discussed with the family                   Electronically signed by Megan Calle MD  on 9/21/2022 at 9:44 AM

## 2022-09-22 LAB — SURGICAL PATHOLOGY REPORT: NORMAL

## 2022-10-03 ENCOUNTER — OFFICE VISIT (OUTPATIENT)
Dept: FAMILY MEDICINE CLINIC | Age: 42
End: 2022-10-03
Payer: COMMERCIAL

## 2022-10-03 VITALS
DIASTOLIC BLOOD PRESSURE: 88 MMHG | BODY MASS INDEX: 31.28 KG/M2 | HEART RATE: 90 BPM | SYSTOLIC BLOOD PRESSURE: 137 MMHG | WEIGHT: 218 LBS

## 2022-10-03 DIAGNOSIS — B96.81 HELICOBACTER PYLORI GASTRITIS: ICD-10-CM

## 2022-10-03 DIAGNOSIS — K29.70 HELICOBACTER PYLORI GASTRITIS: ICD-10-CM

## 2022-10-03 DIAGNOSIS — I10 ESSENTIAL HYPERTENSION: ICD-10-CM

## 2022-10-03 DIAGNOSIS — K29.70 GASTRITIS DETERMINED BY ENDOSCOPY: Primary | ICD-10-CM

## 2022-10-03 DIAGNOSIS — E78.5 HYPERLIPIDEMIA, UNSPECIFIED HYPERLIPIDEMIA TYPE: ICD-10-CM

## 2022-10-03 DIAGNOSIS — E78.1 HYPERTRIGLYCERIDEMIA: ICD-10-CM

## 2022-10-03 DIAGNOSIS — M54.2 NECK PAIN: ICD-10-CM

## 2022-10-03 PROCEDURE — G8427 DOCREV CUR MEDS BY ELIG CLIN: HCPCS | Performed by: STUDENT IN AN ORGANIZED HEALTH CARE EDUCATION/TRAINING PROGRAM

## 2022-10-03 PROCEDURE — G8417 CALC BMI ABV UP PARAM F/U: HCPCS | Performed by: STUDENT IN AN ORGANIZED HEALTH CARE EDUCATION/TRAINING PROGRAM

## 2022-10-03 PROCEDURE — 1036F TOBACCO NON-USER: CPT | Performed by: STUDENT IN AN ORGANIZED HEALTH CARE EDUCATION/TRAINING PROGRAM

## 2022-10-03 PROCEDURE — 99213 OFFICE O/P EST LOW 20 MIN: CPT | Performed by: STUDENT IN AN ORGANIZED HEALTH CARE EDUCATION/TRAINING PROGRAM

## 2022-10-03 PROCEDURE — G8484 FLU IMMUNIZE NO ADMIN: HCPCS | Performed by: STUDENT IN AN ORGANIZED HEALTH CARE EDUCATION/TRAINING PROGRAM

## 2022-10-03 RX ORDER — ATORVASTATIN CALCIUM 40 MG/1
TABLET, FILM COATED ORAL
Qty: 30 TABLET | Refills: 1 | Status: SHIPPED | OUTPATIENT
Start: 2022-10-03

## 2022-10-03 RX ORDER — FENOFIBRATE 48 MG/1
TABLET, COATED ORAL
Qty: 30 TABLET | Refills: 5 | Status: SHIPPED | OUTPATIENT
Start: 2022-10-03

## 2022-10-03 RX ORDER — AMLODIPINE BESYLATE 2.5 MG/1
TABLET ORAL
Qty: 90 TABLET | Refills: 1 | Status: SHIPPED | OUTPATIENT
Start: 2022-10-03

## 2022-10-03 RX ORDER — OMEPRAZOLE 40 MG/1
CAPSULE, DELAYED RELEASE ORAL
Qty: 30 CAPSULE | Refills: 1 | Status: SHIPPED | OUTPATIENT
Start: 2022-10-03

## 2022-10-03 RX ORDER — CYCLOBENZAPRINE HCL 10 MG
10 TABLET ORAL NIGHTLY PRN
Qty: 10 TABLET | Refills: 0 | Status: SHIPPED | OUTPATIENT
Start: 2022-10-03 | End: 2022-10-13

## 2022-10-03 ASSESSMENT — ENCOUNTER SYMPTOMS
ABDOMINAL PAIN: 1
WHEEZING: 0
VOMITING: 0
SHORTNESS OF BREATH: 0
NAUSEA: 0
DIARRHEA: 1

## 2022-10-03 NOTE — PROGRESS NOTES
Visit Information    Have you changed or started any medications since your last visit including any over-the-counter medicines, vitamins, or herbal medicines? no   Have you stopped taking any of your medications? Is so, why? -  no  Are you having any side effects from any of your medications? - no    Have you seen any other physician or provider since your last visit?  no   Have you had any other diagnostic tests since your last visit?  no   Have you been seen in the emergency room and/or had an admission in a hospital since we last saw you?  no   Have you had your routine dental cleaning in the past 6 months?  no     Do you have an active MyChart account? If no, what is the barrier?   Yes    Patient Care Team:  Rafaela Blake MD as PCP - General (Emergency Medicine)    Medical History Review  Past Medical, Family, and Social History reviewed and does not contribute to the patient presenting condition    Health Maintenance   Topic Date Due    Pneumococcal 0-64 years Vaccine (1 - PCV) Never done    Diabetic retinal exam  Never done    Cervical cancer screen  04/26/2021    Flu vaccine (1) 08/01/2022    Hepatitis B vaccine (3 of 3 - Risk 3-dose series) 08/07/2022    Diabetic foot exam  03/10/2023 (Originally 10/7/2021)    Lipids  02/10/2023    A1C test (Diabetic or Prediabetic)  03/10/2023    Depression Screen  05/19/2023    Diabetic microalbuminuria test  05/24/2023    DTaP/Tdap/Td vaccine (2 - Td or Tdap) 03/23/2027    Hepatitis A vaccine  Completed    COVID-19 Vaccine  Completed    Hepatitis C screen  Completed    HIV screen  Completed    Hib vaccine  Aged Out    Meningococcal (ACWY) vaccine  Aged Out    Varicella vaccine  Discontinued

## 2022-10-03 NOTE — PATIENT INSTRUCTIONS
Thank you for letting us take care of you today. We hope all your questions were addressed. If a question was overlooked or something else comes to mind after you return home, please contact a member of your Care Team listed below. Your Care Team at [place] [address] is Team #3  Kendal Hernandez MD (Faculty)  Danielle Messer MD (Faculty  Arnaldotavia Tidwell MD (Resident)  Johanny Jhaveri (Resident)   Noé Anand MD (Resident)  Erendira Bliss., JOSE Howard., JOSE Barnes., SAMAN Ferguson., Nadira Barnhart, Lori Garcia ([address] [facility])  Gadiel Lara, [facility] (Clinical Practice Manager)  Danish Wang [facility] (Clinical Pharmacist)     Office phone number: [phone]    If you need to get in right away due to illness, please be advised we have \"Same Day\" appointments available Monday-Friday. Please call us at [phone] option #3 to schedule your \"Same Day\" appointment.

## 2022-10-03 NOTE — PROGRESS NOTES
Subjective:    Deisy Elena is a 43 y.o. female with  has a past medical history of Anxiety, Arthritis, Class 2 obesity due to excess calories with body mass index (BMI) of 35.0 to 35.9 in adult, Depression, Diabetes mellitus ([address] Gastroesophageal reflux disease, H/O tubal ligation, Headache, High cholesterol, and Osteoarthritis. Family History   Problem Relation Age of Onset    Cancer Brother         kidney    High Blood Pressure Maternal Grandmother     Other Maternal Grandmother         aneurysm    High Blood Pressure Maternal Grandfather     High Blood Pressure Paternal Grandmother     High Blood Pressure Paternal Grandfather        Presented tot office today for:  Chief Complaint   Patient presents with    Abdominal Pain    Neck Pain    Health Maintenance     Vaccines decliend       HPI    Vita Finch is a 42-year-old female with a PMH significant for type 2 diabetes mellitus, essential hypertension, and GERD. Patient is here today for follow-up of abdominal pain. Abdominal pain: Per chart review, patient currently follows with Dr. Candy Marquez gastroenterologist who recently did an EGD on 9/21/2022. Patient was recently treated for H. pylori infection earlier this year. Repeat H. pylori breath test negative after treatment. Patient's EGD biopsy showed mild chronic active gastritis. Negative for H. pylori, intestinal metaplasia, dysplasia. Patient currently takes omeprazole once daily. Reports somewhat improvement in symptoms. She has an upcoming appointment with gastroenterology on the 16th. Denies any fever, chills, chest pain, SOB, or palpitation. Neck pain: Patient reports cervical neck pain for the past 3-6 months. She denies any trauma or falls. No prior surgeries. Patient states she experiences a burning and needle-like sensation. She has tried Flexeril in the past with modest improvement in symptoms.   Denies any headaches, dizziness, blurred vision, numbness or weakness. Review of Systems   Constitutional:  Positive for appetite change. Negative for chills, fatigue and fever. Respiratory:  Negative for shortness of breath and wheezing. Cardiovascular:  Negative for chest pain and palpitations. Gastrointestinal:  Positive for abdominal pain and diarrhea. Negative for nausea and vomiting. Neurological:  Negative for syncope, weakness, numbness and headaches. Objective:    /88 (Site: Left Upper Arm, Position: Sitting, Cuff Size: Medium Adult)   Pulse 90   Wt 218 lb (98.9 kg)   BMI 31.28 kg/m²    BP Readings from Last 3 Encounters:   10/03/22 137/88   09/21/22 117/78   08/16/22 138/89     Physical Exam  Constitutional:       General: She is not in acute distress. Appearance: Normal appearance. She is not ill-appearing. HENT:      Head: Normocephalic and atraumatic. Eyes:      Extraocular Movements: Extraocular movements intact. Neck:      Comments: Decreased flexion 2/2 pain. Normal lateral flexion. Mild TTP. No bony tenderness or step-offs. Sensation intact. Cardiovascular:      Rate and Rhythm: Normal rate and regular rhythm. Pulses: Normal pulses. Pulmonary:      Effort: Pulmonary effort is normal. No respiratory distress. Breath sounds: No wheezing, rhonchi or rales. Abdominal:      General: Bowel sounds are normal. There is no distension. Palpations: Abdomen is soft. Tenderness: There is no abdominal tenderness. There is no guarding. Musculoskeletal:      Cervical back: Muscular tenderness present. Skin:     General: Skin is warm. Neurological:      General: No focal deficit present. Mental Status: She is alert and oriented to person, place, and time.    Psychiatric:         Mood and Affect: Mood normal.         Lab Results   Component Value Date    WBC 5.9 02/10/2022    HGB 12.9 02/10/2022    HCT 37.9 02/10/2022     02/24/2022    CHOL 235 (H) 02/10/2022    TRIG 1,312 (H) 02/10/2022    HDL 39 (L) 02/10/2022    LDLDIRECT 36 02/10/2022    ALT 43 (H) 02/10/2022     (H) 10/25/2021     10/25/2021    K 4.0 10/25/2021     10/25/2021    CREATININE 0.47 (L) 02/10/2022    BUN 15 10/25/2021    CO2 19 (L) 10/25/2021    TSH 3.17 03/26/2019    INR 1.3 02/24/2022    LABA1C 5.7 03/10/2022    LABMICR 39 (H) 05/24/2022     Lab Results   Component Value Date    CALCIUM 8.1 (L) 10/25/2021     Lab Results   Component Value Date    LDLCHOLESTEROL      02/10/2022    LDLDIRECT 36 02/10/2022       Assessment and Plan:    1. Gastritis determined by endoscopy  -S/p EGD on 9/21/2022  -Reviewed biopsy report with patient  -Instructed patient to follow-up with gastroenterology for which she has an appointment scheduled on the 16th of this month    2. Essential hypertension1  -Stable  - amLODIPine (NORVASC) 2.5 MG tablet; TAKE ONE TABLET BY MOUTH DAILY  Dispense: 90 tablet; Refill: 1    3. Hyperlipidemia, unspecified hyperlipidemia type    - atorvastatin (LIPITOR) 40 MG tablet; TAKE ONE TABLET BY MOUTH DAILY  Dispense: 30 tablet; Refill: 1    4. Hypertriglyceridemia    - fenofibrate (TRICOR) 48 MG tablet; TAKE ONE TABLET BY MOUTH DAILY  Dispense: 30 tablet; Refill: 5    5. Helicobacter pylori gastritis  -Treated for H. pylori infection earlier this year  -Repeat H. pylori breath test negative after completion of therapy  -Currently follows with Dr. Michelle Franco gastroenterologist  - omeprazole (PRILOSEC) 40 MG delayed release capsule; TAKE ONE CAPSULE BY MOUTH DAILY  Dispense: 30 capsule; Refill: 1    6. Neck pain  -3-6 months  -No history of trauma  -We will order CT cervical spine to rule out any degenerative changes  -Prescribed Voltaren gel and Flexeril 10 mg once nightly as needed  - CT CERVICAL SPINE WO CONTRAST; Future  - diclofenac sodium (VOLTAREN) 1 % GEL; Apply 2 g topically 2 times daily  Dispense: 50 g; Refill: 0  - cyclobenzaprine (FLEXERIL) 10 MG tablet;  Take 1 tablet by mouth nightly as needed for Muscle spasms  Dispense: 10 tablet; Refill: 0        Requested Prescriptions     Signed Prescriptions Disp Refills    amLODIPine (NORVASC) 2.5 MG tablet 90 tablet 1     Sig: TAKE ONE TABLET BY MOUTH DAILY    atorvastatin (LIPITOR) 40 MG tablet 30 tablet 1     Sig: TAKE ONE TABLET BY MOUTH DAILY    fenofibrate (TRICOR) 48 MG tablet 30 tablet 5     Sig: TAKE ONE TABLET BY MOUTH DAILY    omeprazole (PRILOSEC) 40 MG delayed release capsule 30 capsule 1     Sig: TAKE ONE CAPSULE BY MOUTH DAILY    diclofenac sodium (VOLTAREN) 1 % GEL 50 g 0     Sig: Apply 2 g topically 2 times daily    cyclobenzaprine (FLEXERIL) 10 MG tablet 10 tablet 0     Sig: Take 1 tablet by mouth nightly as needed for Muscle spasms       Medications Discontinued During This Encounter   Medication Reason    amLODIPine (NORVASC) 2.5 MG tablet REORDER    omeprazole (PRILOSEC) 40 MG delayed release capsule REORDER    atorvastatin (LIPITOR) 40 MG tablet REORDER    fenofibrate (TRICOR) 48 MG tablet REORDER       Return in about 6 months (around 4/3/2023). Venice Thompson received counseling on the following healthy behaviors:   Reviewed prior labs and health maintenance  Continue current medications, diet and exercise. Discussed use, benefit, and side effects of prescribed medications. Barriers to medication compliance addressed. Patient given educational materials - see patient instructions  Was a self-tracking handout given in paper form or via [employer]?  No:     Requested Prescriptions     Signed Prescriptions Disp Refills    amLODIPine (NORVASC) 2.5 MG tablet 90 tablet 1     Sig: TAKE ONE TABLET BY MOUTH DAILY    atorvastatin (LIPITOR) 40 MG tablet 30 tablet 1     Sig: TAKE ONE TABLET BY MOUTH DAILY    fenofibrate (TRICOR) 48 MG tablet 30 tablet 5     Sig: TAKE ONE TABLET BY MOUTH DAILY    omeprazole (PRILOSEC) 40 MG delayed release capsule 30 capsule 1     Sig: TAKE ONE CAPSULE BY MOUTH DAILY    diclofenac sodium (VOLTAREN) 1 % GEL 50 g 0     Sig: Apply 2 g topically 2 times daily    cyclobenzaprine (FLEXERIL) 10 MG tablet 10 tablet 0     Sig: Take 1 tablet by mouth nightly as needed for Muscle spasms       All patient questions answered. Patient voiced understanding. Quality Measures    Body mass index is 31.28 kg/m². Elevated. Weight control planned discussed Healthy diet and regular exercise. BP: 137/88 Blood pressure is Normal. Treatment plan consists of No treatment change needed.     Lab Results   Component Value Date    LDLCHOLESTEROL      02/10/2022    LDLDIRECT 36 02/10/2022    (goal LDL reduction with dx if diabetes is 50% LDL reduction)      PHQ Scores 5/19/2022 7/16/2021 3/25/2021 10/29/2020 10/7/2020 1/13/2020 3/26/2019   PHQ2 Score 0 0 0 0 - 0 0   PHQ9 Score 0 0 0 0 0 0 0     Interpretation of Total Score Depression Severity: 1-4 = Minimal depression, 5-9 = Mild depression, 10-14 = Moderate depression, 15-19 = Moderately severe depression, 20-27 = Severe depression

## 2022-10-07 NOTE — PROGRESS NOTES
Attending Physician Statement  I  have discussed the care of Doug Chowdary including pertinent history and exam findings with the resident. I agree with the assessment, plan and orders as documented by the resident. /88 (Site: Left Upper Arm, Position: Sitting, Cuff Size: Medium Adult)   Pulse 90   Wt 218 lb (98.9 kg)   BMI 31.28 kg/m²    BP Readings from Last 3 Encounters:   10/03/22 137/88   09/21/22 117/78   08/16/22 138/89     Wt Readings from Last 3 Encounters:   10/03/22 218 lb (98.9 kg)   09/21/22 210 lb (95.3 kg)   09/08/22 210 lb (95.3 kg)          Diagnosis Orders   1. Gastritis determined by endoscopy        2. Essential hypertension  amLODIPine (NORVASC) 2.5 MG tablet      3. Hyperlipidemia, unspecified hyperlipidemia type  atorvastatin (LIPITOR) 40 MG tablet      4. Hypertriglyceridemia  fenofibrate (TRICOR) 48 MG tablet      5. Helicobacter pylori gastritis  omeprazole (PRILOSEC) 40 MG delayed release capsule      6.  Neck pain  CT CERVICAL SPINE WO CONTRAST    diclofenac sodium (VOLTAREN) 1 % GEL    cyclobenzaprine (FLEXERIL) 10 MG tablet          Bettina Krabbe, DO 10/7/2022 3:58 PM

## 2022-10-08 ENCOUNTER — HOSPITAL ENCOUNTER (OUTPATIENT)
Dept: ULTRASOUND IMAGING | Age: 42
Discharge: HOME | End: 2022-10-10
Payer: COMMERCIAL

## 2022-10-08 DIAGNOSIS — R10.13 EPIGASTRIC PAIN: ICD-10-CM

## 2022-10-08 DIAGNOSIS — R16.0 HEPATOMEGALY: ICD-10-CM

## 2022-10-08 PROCEDURE — 76705 ECHO EXAM OF ABDOMEN: CPT

## 2022-10-10 RX ORDER — ALLOPURINOL 300 MG/1
TABLET ORAL
Qty: 30 TABLET | Refills: 0 | OUTPATIENT
Start: 2022-10-10

## 2022-10-10 NOTE — TELEPHONE ENCOUNTER
Last visit: 10/3/22  Last Med refill: 9/9/22  Does patient have enough medication for 72 hours: No:     Next Visit Date:  Future Appointments   Date Time Provider Mallory Hubbard   10/15/2022  9:00 AM STA CT SCAN RM 1 [facility] CT SCAN [facility] Radiolog   10/17/2022  3:00 PM Juancho Dukes MD [facility] [address] Maintenance   Topic Date Due    Pneumococcal 0-64 years Vaccine (1 - PCV) Never done    Diabetic retinal exam  Never done    Cervical cancer screen  04/26/2021    Hepatitis B vaccine (3 of 3 - Risk 3-dose series) 08/07/2022    Diabetic foot exam  03/10/2023 (Originally 10/7/2021)    Flu vaccine (1) 10/03/2023 (Originally 8/1/2022)    Lipids  02/10/2023    A1C test (Diabetic or Prediabetic)  03/10/2023    Depression Screen  05/19/2023    Diabetic microalbuminuria test  05/24/2023    DTaP/Tdap/Td vaccine (2 - Td or Tdap) 03/23/2027    Hepatitis A vaccine  Completed    COVID-19 Vaccine  Completed    Hepatitis C screen  Completed    HIV screen  Completed    Hib vaccine  Aged Out    Meningococcal (ACWY) vaccine  Aged Out    Varicella vaccine  Discontinued       Hemoglobin A1C (%)   Date Value   03/10/2022 5.7   07/16/2021 5.2   10/07/2020 5.5             ( goal A1C is < 7)   Microalb/Crt.  Ratio (mcg/mg creat)   Date Value   05/24/2022 39 (H)     LDL Cholesterol (mg/dL)   Date Value   02/10/2022        10/20/2020            (goal LDL is <100)   AST (U/L)   Date Value   10/25/2021 123 (H)     ALT (U/L)   Date Value   02/10/2022 43 (H)     BUN (mg/dL)   Date Value   10/25/2021 15     BP Readings from Last 3 Encounters:   10/03/22 137/88   09/21/22 117/78   08/16/22 138/89          (goal 120/80)    All Future Testing planned in [facility]  Lab Frequency Next Occurrence   Microalbumin, Ur Once 03/10/2023   EGD Once 11/16/2022   CT CERVICAL SPINE WO CONTRAST Once 10/03/2022               Patient Active Problem List:     New persistent daily headache     Abnormal CT scan, head     Frequent headaches Migraine without status migrainosus, not intractable     Class 2 obesity due to excess calories with body mass index (BMI) of 35.0 to 35.9 in adult     Neck muscle spasm     Encounter for smoking cessation counseling     Gout tophi     Essential hypertension     Type 2 diabetes mellitus without complication, without long-term current use of insulin (HCC)     Gastroesophageal reflux disease     Hypertriglyceridemia     Epigastric pain     Encounter for HCV screening test for high risk patient     Helicobacter pylori gastritis

## 2022-10-11 RX ORDER — ALLOPURINOL 300 MG/1
TABLET ORAL
Qty: 30 TABLET | Refills: 0 | Status: SHIPPED | OUTPATIENT
Start: 2022-10-11

## 2022-10-11 RX ORDER — DICYCLOMINE HCL 20 MG
TABLET ORAL
Qty: 120 TABLET | Refills: 1 | Status: SHIPPED | OUTPATIENT
Start: 2022-10-11

## 2022-10-12 DIAGNOSIS — M54.2 NECK PAIN: ICD-10-CM

## 2022-10-12 NOTE — TELEPHONE ENCOUNTER
Last visit: 10/3/22  Last Med refill: 10/3/22  Does patient have enough medication for 72 hours: Yes    Next Visit Date:  Future Appointments   Date Time Provider Mallory Haydee   10/15/2022  9:00 AM STA CT SCAN RM 1 [facility] CT SCAN [facility] Radiolog   10/17/2022  3:00 PM Alin Bansal MD [facility] [address] Maintenance   Topic Date Due    Pneumococcal 0-64 years Vaccine (1 - PCV) Never done    Diabetic retinal exam  Never done    Cervical cancer screen  04/26/2021    Hepatitis B vaccine (3 of 3 - Risk 3-dose series) 08/07/2022    Diabetic foot exam  03/10/2023 (Originally 10/7/2021)    Flu vaccine (1) 10/03/2023 (Originally 8/1/2022)    Lipids  02/10/2023    A1C test (Diabetic or Prediabetic)  03/10/2023    Depression Screen  05/19/2023    Diabetic microalbuminuria test  05/24/2023    DTaP/Tdap/Td vaccine (2 - Td or Tdap) 03/23/2027    Hepatitis A vaccine  Completed    COVID-19 Vaccine  Completed    Hepatitis C screen  Completed    HIV screen  Completed    Hib vaccine  Aged Out    Meningococcal (ACWY) vaccine  Aged Out    Varicella vaccine  Discontinued       Hemoglobin A1C (%)   Date Value   03/10/2022 5.7   07/16/2021 5.2   10/07/2020 5.5             ( goal A1C is < 7)   Microalb/Crt.  Ratio (mcg/mg creat)   Date Value   05/24/2022 39 (H)     LDL Cholesterol (mg/dL)   Date Value   02/10/2022        10/20/2020            (goal LDL is <100)   AST (U/L)   Date Value   10/25/2021 123 (H)     ALT (U/L)   Date Value   02/10/2022 43 (H)     BUN (mg/dL)   Date Value   10/25/2021 15     BP Readings from Last 3 Encounters:   10/03/22 137/88   09/21/22 117/78   08/16/22 138/89          (goal 120/80)    All Future Testing planned in [facility]  Lab Frequency Next Occurrence   Microalbumin, Ur Once 03/10/2023   EGD Once 11/16/2022   CT CERVICAL SPINE WO CONTRAST Once 10/03/2022               Patient Active Problem List:     New persistent daily headache     Abnormal CT scan, head     Frequent headaches Migraine without status migrainosus, not intractable     Class 2 obesity due to excess calories with body mass index (BMI) of 35.0 to 35.9 in adult     Neck muscle spasm     Encounter for smoking cessation counseling     Gout tophi     Essential hypertension     Type 2 diabetes mellitus without complication, without long-term current use of insulin (HCC)     Gastroesophageal reflux disease     Hypertriglyceridemia     Epigastric pain     Encounter for HCV screening test for high risk patient     Helicobacter pylori gastritis

## 2022-10-14 ENCOUNTER — TELEPHONE (OUTPATIENT)
Dept: FAMILY MEDICINE CLINIC | Age: 42
End: 2022-10-14

## 2022-10-14 DIAGNOSIS — M54.2 NECK PAIN: Primary | ICD-10-CM

## 2022-10-14 NOTE — TELEPHONE ENCOUNTER
Patient called office stating she can not get a CT until she has an xray first, insurance will not pay for CT until patient has xray of neck/spine first, please place order for xray; CT was cancelled. Please advise, thanks.

## 2022-10-17 ENCOUNTER — OFFICE VISIT (OUTPATIENT)
Dept: GASTROENTEROLOGY | Age: 42
End: 2022-10-17
Payer: COMMERCIAL

## 2022-10-17 VITALS
DIASTOLIC BLOOD PRESSURE: 103 MMHG | WEIGHT: 218 LBS | SYSTOLIC BLOOD PRESSURE: 158 MMHG | HEIGHT: 70 IN | BODY MASS INDEX: 31.21 KG/M2

## 2022-10-17 DIAGNOSIS — R16.0 HEPATOMEGALY: Primary | ICD-10-CM

## 2022-10-17 DIAGNOSIS — F10.21 HISTORY OF ALCOHOLISM (HCC): ICD-10-CM

## 2022-10-17 PROCEDURE — G8484 FLU IMMUNIZE NO ADMIN: HCPCS | Performed by: INTERNAL MEDICINE

## 2022-10-17 PROCEDURE — 99213 OFFICE O/P EST LOW 20 MIN: CPT | Performed by: INTERNAL MEDICINE

## 2022-10-17 PROCEDURE — 1036F TOBACCO NON-USER: CPT | Performed by: INTERNAL MEDICINE

## 2022-10-17 PROCEDURE — G8417 CALC BMI ABV UP PARAM F/U: HCPCS | Performed by: INTERNAL MEDICINE

## 2022-10-17 PROCEDURE — G8427 DOCREV CUR MEDS BY ELIG CLIN: HCPCS | Performed by: INTERNAL MEDICINE

## 2022-10-17 ASSESSMENT — ENCOUNTER SYMPTOMS
ALLERGIC/IMMUNOLOGIC NEGATIVE: 1
NAUSEA: 1
RESPIRATORY NEGATIVE: 1
VOMITING: 1
DIARRHEA: 1
ABDOMINAL PAIN: 1
ABDOMINAL DISTENTION: 1
EYES NEGATIVE: 1

## 2022-10-17 NOTE — PROGRESS NOTES
GI OFFICE FOLLOW UP    INTERVAL HISTORY:   No referring provider defined for this encounter. Chief Complaint   Patient presents with    Other     Pt here for egd f/u, pt states her symptoms have not improved since she was here last        1. Hepatomegaly    2. History of alcoholism ([facility] [address]              HISTORY OF PRESENT ILLNESS:   Patient seen for follow-up of upper abdominal discomfort. Patient does drink alcohol. Her transaminases pattern consistent with alcoholic hepatitis. AST was 123 ALT 47. Her recent lipase is mildly elevated up to 61. At present patient to continue to drink alcohol/vodka. Denies nausea vomiting. Has mild epigastric discomfort. No radiation. Bowel movements satisfactory. No melena or hematochezia. .    Patient had EGD done recently and was found to have small polyp near the pylorus, biopsy revealed benign histology. No varices identified. Past Medical,Family, and Social History reviewed and does contribute to the patient presenting condition. Patient's PMH/PSH,SH,PSYCH Hx, MEDs, ALLERGIES, and ROS were all reviewed and updated in the appropriate sections.  Yes      PAST MEDICAL HISTORY:  Past Medical History:   Diagnosis Date    Anxiety     Arthritis     knees and hands    Class 2 obesity due to excess calories with body mass index (BMI) of 35.0 to 35.9 in adult 10/4/2018    Depression     Diabetes mellitus ([facility] [address]     DIET CONTROLLED/ PT DENIES Currently    Gastroesophageal reflux disease 2/10/2022    H/O tubal ligation 2004    Headache     High cholesterol     Osteoarthritis        Past Surgical History:   Procedure Laterality Date    ARM SURGERY Left     FOOT NEUROMA SURGERY Left 8/15/2019    EXCISION LEFT HALLUX BENIGN SOFT TISSUE performed by Jaycee Vázquez DPM at [address] [facility] forearm with plate 9 years ago    Khadijah Rodriguez TOE SURGERY Left 08/15/2019    Excision of soft tissue mass, left hallux    TUBAL LIGATION  2004    UPPER GASTROINTESTINAL ENDOSCOPY N/A 9/21/2022    EGD BIOPSY performed by Demar Babcock MD at [place] [address]  2/24/2022    US GUIDED LIVER BIOPSY PERCUTANEOUS 2/24/2022 [facility] ULTRASOUND    WISDOM TOOTH EXTRACTION Bilateral        CURRENT MEDICATIONS:    Current Outpatient Medications:     dicyclomine (BENTYL) 20 MG tablet, TAKE ONE TABLET BY MOUTH FOUR TIMES A DAY BEFORE A MEAL AS NEEDED FOR ABDOMINAL SPASMS, Disp: 120 tablet, Rfl: 1    allopurinol (ZYLOPRIM) 300 MG tablet, TAKE ONE TABLET BY MOUTH DAILY, Disp: 30 tablet, Rfl: 0    diclofenac sodium (VOLTAREN) 1 % GEL, APPLY 2 GRAMS TOPICALLY TWO TIMES A DAY, Disp: 50 g, Rfl: 0    amLODIPine (NORVASC) 2.5 MG tablet, TAKE ONE TABLET BY MOUTH DAILY, Disp: 90 tablet, Rfl: 1    atorvastatin (LIPITOR) 40 MG tablet, TAKE ONE TABLET BY MOUTH DAILY, Disp: 30 tablet, Rfl: 1    fenofibrate (TRICOR) 48 MG tablet, TAKE ONE TABLET BY MOUTH DAILY, Disp: 30 tablet, Rfl: 5    omeprazole (PRILOSEC) 40 MG delayed release capsule, TAKE ONE CAPSULE BY MOUTH DAILY, Disp: 30 capsule, Rfl: 1    ALLERGIES:   Allergies   Allergen Reactions    Iodine Anaphylaxis    Shellfish-Derived Products Anaphylaxis       FAMILY HISTORY:       Problem Relation Age of Onset    Cancer Brother         kidney    High Blood Pressure Maternal Grandmother     Other Maternal Grandmother         aneurysm    High Blood Pressure Maternal Grandfather     High Blood Pressure Paternal Grandmother     High Blood Pressure Paternal Grandfather          SOCIAL HISTORY:   Social History     Socioeconomic History    Marital status:      Spouse name: Not on file    Number of children: Not on file    Years of education: Not on file    Highest education level: Not on file   Occupational History    Not on file   Tobacco Use    Smoking status: Former     Types: Cigarettes     Quit date: 10/2018     Years since quittin.0    Smokeless tobacco: Never   Vaping Use    Vaping Use: Never used   Substance and Sexual Activity    Alcohol use: Yes     Alcohol/week: 0.0 standard drinks     Comment: social    Drug use: No    Sexual activity: Yes   Other Topics Concern    Not on file   Social History Narrative    Not on file     Social Determinants of Health     Financial Resource Strain: Medium Risk    Difficulty of Paying Living Expenses: Somewhat hard   Food Insecurity: No Food Insecurity    Worried About Running Out of Food in the Last Year: Never true    Ran Out of Food in the Last Year: Never true   Transportation Needs: Not on file   Physical Activity: Not on file   Stress: Not on file   Social Connections: Not on file   Intimate Partner Violence: Not on file   Housing Stability: Not on file         REVIEW OF SYSTEMS:         Review of Systems   Constitutional:  Positive for appetite change and fatigue. HENT: Negative. Eyes: Negative. Respiratory: Negative. Cardiovascular: Negative. Gastrointestinal:  Positive for abdominal distention, abdominal pain, diarrhea, nausea and vomiting. Endocrine: Negative. Genitourinary: Negative. Musculoskeletal: Negative. Skin: Negative. Allergic/Immunologic: Negative. Neurological: Negative. Hematological:  Bruises/bleeds easily. Psychiatric/Behavioral: Negative. PHYSICAL EXAMINATION    Vital signs reviewed per the nursing documentation. BP (!) 158/103   Ht 5' 10\" (1.778 m)   Wt 218 lb (98.9 kg)   BMI 31.28 kg/m²   Body mass index is 31.28 kg/m². Physical Exam  Vitals and nursing note reviewed. Constitutional:       Appearance: Normal appearance. She is well-developed. HENT:      Head: Normocephalic and atraumatic. Eyes:      General: No scleral icterus. Extraocular Movements: Extraocular movements intact.       Conjunctiva/sclera: Conjunctivae normal.      Pupils: Pupils are equal, round, and reactive to light. Neck:      Thyroid: No thyromegaly. Vascular: No hepatojugular reflux or JVD. Trachea: No tracheal deviation. Cardiovascular:      Rate and Rhythm: Normal rate and regular rhythm. Heart sounds: Normal heart sounds. Pulmonary:      Effort: Pulmonary effort is normal. No respiratory distress. Breath sounds: Normal breath sounds. No wheezing or rales. Abdominal:      General: Bowel sounds are normal. There is no distension. Palpations: Abdomen is soft. There is no hepatomegaly or mass. Tenderness: There is no abdominal tenderness. There is no rebound. Hernia: No hernia is present. Musculoskeletal:         General: No tenderness. Cervical back: Normal range of motion and neck supple. Comments: No joint swelling   Lymphadenopathy:      Cervical: No cervical adenopathy. Skin:     General: Skin is warm and dry. Findings: No bruising, ecchymosis, erythema or rash. Neurological:      Mental Status: She is alert and oriented to person, place, and time. Cranial Nerves: No cranial nerve deficit. Psychiatric:         Mood and Affect: Mood normal.         Behavior: Behavior normal.         Thought Content:  Thought content normal.         LABORATORY DATA: Reviewed  Lab Results   Component Value Date    WBC 5.9 02/10/2022    HGB 12.9 02/10/2022    HCT 37.9 02/10/2022    .0 (H) 02/10/2022     02/24/2022     10/25/2021    K 4.0 10/25/2021     10/25/2021    CO2 19 (L) 10/25/2021    BUN 15 10/25/2021    CREATININE 0.47 (L) 02/10/2022    LABALBU 4.1 10/25/2021    BILITOT 1.04 10/25/2021    ALKPHOS 87 10/25/2021     (H) 10/25/2021    ALT 43 (H) 02/10/2022    INR 1.3 02/24/2022         Lab Results   Component Value Date    RBC 3.61 (L) 02/10/2022    HGB 12.9 02/10/2022    .0 (H) 02/10/2022    MCH 35.7 (H) 02/10/2022    MCHC 34.0 02/10/2022    RDW 14.5 (H) 02/10/2022    MPV 9.7 02/10/2022    BASOPCT 1 02/10/2022 LYMPHSABS 1.89 02/10/2022    MONOSABS 0.37 02/10/2022    NEUTROABS 3.47 02/10/2022    EOSABS 0.05 02/10/2022    BASOSABS 0.05 02/10/2022         DIAGNOSTIC TESTING:     US LIVER    Result Date: 10/8/2022  EXAMINATION: RIGHT UPPER QUADRANT ULTRASOUND 10/8/2022 8:56 am COMPARISON: None. HISTORY: ORDERING SYSTEM PROVIDED HISTORY: Hepatomegaly TECHNOLOGIST PROVIDED HISTORY: This procedure can be scheduled via [employer]. Access your [employer] account by visiting [email or web address]. FINDINGS: LIVER:  Liver shows increased echogenicity suggesting hepatic steatosis with focal fatty sparing regional to the gallbladder fossa. .  Liver is enlarged at 23.6 cm in length. Hepatopetal flow portal vein. BILIARY SYSTEM:  Gallbladder is unremarkable without evidence of pericholecystic fluid, wall thickening or stones. Negative sonographic Hancock's sign. Common bile duct is within normal limits measuring 3.5 mm. RIGHT KIDNEY: The right kidney is grossly unremarkable without evidence of hydronephrosis. PANCREAS:  Visualized portions of the pancreas are unremarkable. OTHER: No evidence of right upper quadrant ascites. Hepatic steatosis and hepatomegaly. Focal fatty sparing regional to the gallbladder fossa. Exam otherwise unremarkable. Assessment  1. Hepatomegaly    2. History of alcoholism ([address]        Plan  Discussed with the patient regarding EGD findings and reassured. Strongly encouraged to cut down and stop alcohol. Discussed with the patient regarding fatty liver may be related to alcoholism. Strongly encouraged active lifestyle, exercise activity, calorie restriction and to lose some weight. We will repeat her labs in the next 3 to 4 months including liver battery and lipase. In between if he has any worsening of the symptoms to contact me. Thank you for allowing me to participate in the care of Ms. Martín Mtz. For any further questions please do not hesitate to contact me.     I have reviewed and agree with the ROS entered by the MA/LPN. Note is dictated utilizing voice recognition software. Unfortunately this leads to occasional typographical errors.  Please contact our office if you have any questions        John Kumar MD,FACP, Cheryle Deck  Board Certified in Gastroenterology and [address] [place] [address] Gastroenterology  Office #: [phone]

## 2022-10-21 ENCOUNTER — APPOINTMENT (OUTPATIENT)
Dept: ULTRASOUND IMAGING | Age: 42
End: 2022-10-21
Payer: COMMERCIAL

## 2022-10-21 ENCOUNTER — APPOINTMENT (OUTPATIENT)
Dept: CT IMAGING | Age: 42
End: 2022-10-21
Payer: COMMERCIAL

## 2022-10-21 ENCOUNTER — HOSPITAL ENCOUNTER (EMERGENCY)
Age: 42
Discharge: HOME | End: 2022-10-21
Attending: EMERGENCY MEDICINE
Payer: COMMERCIAL

## 2022-10-21 VITALS
OXYGEN SATURATION: 96 % | DIASTOLIC BLOOD PRESSURE: 106 MMHG | SYSTOLIC BLOOD PRESSURE: 162 MMHG | BODY MASS INDEX: 30.06 KG/M2 | TEMPERATURE: 98.2 F | WEIGHT: 210 LBS | HEART RATE: 93 BPM | RESPIRATION RATE: 18 BRPM | HEIGHT: 70 IN

## 2022-10-21 DIAGNOSIS — R10.10 PAIN OF UPPER ABDOMEN: ICD-10-CM

## 2022-10-21 DIAGNOSIS — R11.0 NAUSEA: Primary | ICD-10-CM

## 2022-10-21 LAB
ABSOLUTE EOS #: 0.07 K/UL (ref 0–0.44)
ABSOLUTE IMMATURE GRANULOCYTE: 0.04 K/UL (ref 0–0.3)
ABSOLUTE LYMPH #: 2.21 K/UL (ref 1.1–3.7)
ABSOLUTE MONO #: 0.25 K/UL (ref 0.1–1.2)
ALBUMIN SERPL-MCNC: 4.5 G/DL (ref 3.5–5.2)
ALP BLD-CCNC: 78 U/L (ref 35–104)
ALT SERPL-CCNC: 41 U/L (ref 5–33)
ANION GAP SERPL CALCULATED.3IONS-SCNC: 15 MMOL/L (ref 9–17)
AST SERPL-CCNC: 87 U/L
BASOPHILS # BLD: 1 % (ref 0–2)
BASOPHILS ABSOLUTE: 0.05 K/UL (ref 0–0.2)
BILIRUB SERPL-MCNC: 0.7 MG/DL (ref 0.3–1.2)
BILIRUBIN DIRECT: 0.2 MG/DL
BILIRUBIN, INDIRECT: 0.5 MG/DL (ref 0–1)
BUN BLDV-MCNC: 14 MG/DL (ref 6–20)
BUN/CREAT BLD: 27 (ref 9–20)
CALCIUM SERPL-MCNC: 8.9 MG/DL (ref 8.6–10.4)
CHLORIDE BLD-SCNC: 106 MMOL/L (ref 98–107)
CO2: 22 MMOL/L (ref 20–31)
CREAT SERPL-MCNC: 0.52 MG/DL (ref 0.5–0.9)
EOSINOPHILS RELATIVE PERCENT: 1 % (ref 1–4)
GFR SERPL CREATININE-BSD FRML MDRD: >60 ML/MIN/1.73M2
GLUCOSE BLD-MCNC: 130 MG/DL (ref 70–99)
HCT VFR BLD CALC: 43.2 % (ref 36.3–47.1)
HEMOGLOBIN: 14.2 G/DL (ref 11.9–15.1)
IMMATURE GRANULOCYTES: 1 %
LIPASE: 42 U/L (ref 13–60)
LYMPHOCYTES # BLD: 42 % (ref 24–43)
MCH RBC QN AUTO: 34.4 PG (ref 25.2–33.5)
MCHC RBC AUTO-ENTMCNC: 32.9 G/DL (ref 28.4–34.8)
MCV RBC AUTO: 104.6 FL (ref 82.6–102.9)
MONOCYTES # BLD: 5 % (ref 3–12)
NRBC AUTOMATED: 0 PER 100 WBC
PDW BLD-RTO: 13.8 % (ref 11.8–14.4)
PLATELET # BLD: 151 K/UL (ref 138–453)
PMV BLD AUTO: 9.4 FL (ref 8.1–13.5)
POTASSIUM SERPL-SCNC: 4 MMOL/L (ref 3.7–5.3)
RBC # BLD: 4.13 M/UL (ref 3.95–5.11)
RBC # BLD: ABNORMAL 10*6/UL
SEG NEUTROPHILS: 50 % (ref 36–65)
SEGMENTED NEUTROPHILS ABSOLUTE COUNT: 2.65 K/UL (ref 1.5–8.1)
SODIUM BLD-SCNC: 143 MMOL/L (ref 135–144)
TOTAL PROTEIN: 7.7 G/DL (ref 6.4–8.3)
WBC # BLD: 5.3 K/UL (ref 3.5–11.3)

## 2022-10-21 PROCEDURE — 99284 EMERGENCY DEPT VISIT MOD MDM: CPT

## 2022-10-21 PROCEDURE — 83690 ASSAY OF LIPASE: CPT

## 2022-10-21 PROCEDURE — 76705 ECHO EXAM OF ABDOMEN: CPT

## 2022-10-21 PROCEDURE — 80076 HEPATIC FUNCTION PANEL: CPT

## 2022-10-21 PROCEDURE — C9113 INJ PANTOPRAZOLE SODIUM, VIA: HCPCS | Performed by: NURSE PRACTITIONER

## 2022-10-21 PROCEDURE — 6370000000 HC RX 637 (ALT 250 FOR IP): Performed by: NURSE PRACTITIONER

## 2022-10-21 PROCEDURE — 96374 THER/PROPH/DIAG INJ IV PUSH: CPT

## 2022-10-21 PROCEDURE — 6360000002 HC RX W HCPCS: Performed by: NURSE PRACTITIONER

## 2022-10-21 PROCEDURE — 80048 BASIC METABOLIC PNL TOTAL CA: CPT

## 2022-10-21 PROCEDURE — 2580000003 HC RX 258: Performed by: NURSE PRACTITIONER

## 2022-10-21 PROCEDURE — 96375 TX/PRO/DX INJ NEW DRUG ADDON: CPT

## 2022-10-21 PROCEDURE — 85025 COMPLETE CBC W/AUTO DIFF WBC: CPT

## 2022-10-21 PROCEDURE — 74176 CT ABD & PELVIS W/O CONTRAST: CPT

## 2022-10-21 RX ORDER — ONDANSETRON 2 MG/ML
4 INJECTION INTRAMUSCULAR; INTRAVENOUS ONCE
Status: COMPLETED | OUTPATIENT
Start: 2022-10-21 | End: 2022-10-21

## 2022-10-21 RX ORDER — HYDROCODONE BITARTRATE AND ACETAMINOPHEN 5; 325 MG/1; MG/1
1 TABLET ORAL EVERY 8 HOURS PRN
Qty: 12 TABLET | Refills: 0 | Status: SHIPPED | OUTPATIENT
Start: 2022-10-21 | End: 2022-10-25

## 2022-10-21 RX ORDER — SUCRALFATE 1 G/1
1 TABLET ORAL 4 TIMES DAILY
Qty: 20 TABLET | Refills: 0 | Status: SHIPPED | OUTPATIENT
Start: 2022-10-21

## 2022-10-21 RX ORDER — ONDANSETRON 4 MG/1
4 TABLET, ORALLY DISINTEGRATING ORAL EVERY 8 HOURS PRN
Qty: 20 TABLET | Refills: 0 | Status: SHIPPED | OUTPATIENT
Start: 2022-10-21

## 2022-10-21 RX ADMIN — LIDOCAINE HYDROCHLORIDE: 20 SOLUTION ORAL; TOPICAL at 11:44

## 2022-10-21 RX ADMIN — SODIUM CHLORIDE, PRESERVATIVE FREE 40 MG: 5 INJECTION INTRAVENOUS at 11:44

## 2022-10-21 RX ADMIN — ONDANSETRON 4 MG: 2 INJECTION INTRAMUSCULAR; INTRAVENOUS at 11:44

## 2022-10-21 ASSESSMENT — PAIN - FUNCTIONAL ASSESSMENT: PAIN_FUNCTIONAL_ASSESSMENT: 0-10

## 2022-10-21 ASSESSMENT — ENCOUNTER SYMPTOMS
DIARRHEA: 1
VOMITING: 0
ABDOMINAL PAIN: 1
SHORTNESS OF BREATH: 0
COUGH: 0
NAUSEA: 1
SORE THROAT: 0

## 2022-10-21 ASSESSMENT — PAIN SCALES - GENERAL: PAINLEVEL_OUTOF10: 10

## 2022-10-21 NOTE — ED PROVIDER NOTES
[address] ED  eMERGENCY dEPARTMENT eNCOUnter      Pt Name: Wendy Donahue  MRN: [id number]  Franckgfcarmen 1980  Date of evaluation: 10/21/2022  Provider: TEJAS Edwards [id number]       Chief Complaint   Patient presents with    Abdominal Pain    Nausea         HISTORY OF PRESENT ILLNESS  (Location/Symptom, Timing/Onset, Context/Setting, Quality, Duration, Modifying Factors, Severity.)   Wendy Donahue is a 43 y.o. female who presents to the emergency department. C/o upper abd pain, nausea, diarrhea. Onset was 2 years ago. Denies fever, chills, injury, urinary sx. States she is newly established with Dr. Christiana Mancilla. She had an endoscopy last month. Rates her pain 10/10 at this time. She had a hernia repair 2-3 years ago. Denies chance of pregnancy. Nursing Notes were reviewed.     ALLERGIES     Iodine and Shellfish-derived products    CURRENT MEDICATIONS       Previous Medications    ALLOPURINOL (ZYLOPRIM) 300 MG TABLET    TAKE ONE TABLET BY MOUTH DAILY    AMLODIPINE (NORVASC) 2.5 MG TABLET    TAKE ONE TABLET BY MOUTH DAILY    ATORVASTATIN (LIPITOR) 40 MG TABLET    TAKE ONE TABLET BY MOUTH DAILY    DICLOFENAC SODIUM (VOLTAREN) 1 % GEL    APPLY 2 GRAMS TOPICALLY TWO TIMES A DAY    DICYCLOMINE (BENTYL) 20 MG TABLET    TAKE ONE TABLET BY MOUTH FOUR TIMES A DAY BEFORE A MEAL AS NEEDED FOR ABDOMINAL SPASMS    FENOFIBRATE (TRICOR) 48 MG TABLET    TAKE ONE TABLET BY MOUTH DAILY    OMEPRAZOLE (PRILOSEC) 40 MG DELAYED RELEASE CAPSULE    TAKE ONE CAPSULE BY MOUTH DAILY       PAST MEDICAL HISTORY         Diagnosis Date    Anxiety     Arthritis     knees and hands    Class 2 obesity due to excess calories with body mass index (BMI) of 35.0 to 35.9 in adult 10/4/2018    Depression     Diabetes mellitus ([facility] [address]     DIET CONTROLLED/ PT DENIES Currently    Gastroesophageal reflux disease 2/10/2022    H/O tubal ligation 2004    Headache     High cholesterol     Osteoarthritis SURGICAL HISTORY           Procedure Laterality Date    ARM SURGERY Left     FOOT NEUROMA SURGERY Left 8/15/2019    EXCISION LEFT HALLUX BENIGN SOFT TISSUE performed by Shireen Grimaldo DPM at [address]      lt forearm with plate 9 years ago    [address] Left 08/15/2019    Excision of soft tissue mass, left hallux    TUBAL LIGATION      UPPER GASTROINTESTINAL ENDOSCOPY N/A 2022    EGD BIOPSY performed by Shea Jefferson MD at [place] [address]  2022    US GUIDED LIVER BIOPSY PERCUTANEOUS 2022 [facility] ULTRASOUND    WISDOM TOOTH EXTRACTION Bilateral          FAMILY HISTORY           Problem Relation Age of Onset    Cancer Brother         kidney    High Blood Pressure Maternal Grandmother     Other Maternal Grandmother         aneurysm    High Blood Pressure Maternal Grandfather     High Blood Pressure Paternal Grandmother     High Blood Pressure Paternal Grandfather      Family Status   Relation Name Status    Brother      MGM  (Not Specified)    MGF  (Not Specified)    PGM  (Not Specified)    PGF  (Not Specified)    Mother  Alive    Father  Alive        SOCIAL HISTORY      reports that she quit smoking about 4 years ago. Her smoking use included cigarettes. She has never used smokeless tobacco. She reports current alcohol use. She reports that she does not use drugs. REVIEW OF SYSTEMS    (2-9 systems for level 4, 10 or more for level 5)     Review of Systems   Constitutional:  Negative for chills, diaphoresis, fatigue and fever. HENT:  Negative for congestion and sore throat. Respiratory:  Negative for cough and shortness of breath. Cardiovascular:  Negative for chest pain. Gastrointestinal:  Positive for abdominal pain, diarrhea and nausea. Negative for vomiting. Genitourinary:  Negative for dysuria, flank pain, frequency, hematuria and urgency. Neurological:  Negative for weakness and headaches. Except as noted above the remainder of the review of systems was reviewed and negative. PHYSICAL EXAM    (up to 7 for level 4, 8 or more for level 5)     ED Triage Vitals [10/21/22 0957]   BP Temp Temp src Heart Rate Resp SpO2 Height Weight   (!) 162/106 98.2 °F (36.8 °C) -- (!) 106 18 96 % 5' 10\" (1.778 m) 210 lb (95.3 kg)     Physical Exam  Vitals reviewed. Constitutional:       General: She is not in acute distress. Appearance: She is well-developed. She is not diaphoretic. Eyes:      General: No scleral icterus. Conjunctiva/sclera: Conjunctivae normal.   Cardiovascular:      Rate and Rhythm: Normal rate. Pulmonary:      Effort: Pulmonary effort is normal. No respiratory distress. Breath sounds: No stridor. No rales. Abdominal:      General: There is no distension. Palpations: Abdomen is soft. Tenderness: There is abdominal tenderness in the right upper quadrant, epigastric area and left upper quadrant. There is no guarding. Musculoskeletal:      Cervical back: Neck supple. Comments: Moves extremities. Skin:     General: Skin is warm and dry. Findings: No rash. Neurological:      Mental Status: She is alert and oriented to person, place, and time. Psychiatric:         Behavior: Behavior normal.          DIAGNOSTIC RESULTS     RADIOLOGY:   Non-plain film images such as CT, Ultrasound and MRI are read by the radiologist. Plain radiographic images are visualized and preliminarily interpreted by the emergency physician with the below findings:    Interpretation per the Radiologist below, if available at the time of this note:    CT ABDOMEN PELVIS WO CONTRAST Additional Contrast? None    Result Date: 10/21/2022  EXAMINATION: CT OF THE ABDOMEN AND PELVIS WITHOUT CONTRAST 10/21/2022 12:12 pm TECHNIQUE: CT of the abdomen and pelvis was performed without the administration of intravenous contrast. Multiplanar reformatted images are provided for review.  Automated exposure control, iterative reconstruction, and/or weight based adjustment of the mA/kV was utilized to reduce the radiation dose to as low as reasonably achievable. COMPARISON: 10/25/2021 HISTORY: ORDERING SYSTEM PROVIDED HISTORY: pain TECHNOLOGIST PROVIDED HISTORY: pain Decision Support Exception - unselect if not a suspected or confirmed emergency medical condition->Emergency Medical Condition (MA) Is the patient pregnant?->No Reason for Exam: Abdominal pain, nausea, diarrhea 45-year-old female with abdominal pain, nausea, and diarrhea FINDINGS: Lower Chest: Mild bibasilar atelectasis and respiratory motion. No free intra-abdominal air. Organs: Fatty liver. Gallbladder distension. Adrenal glands, pancreas, spleen grossly unremarkable in appearance. No obstructing calculus, hydronephrosis, or hydroureter. GI/Bowel: No significant dilation of small bowel loops to suggest small bowel obstruction. Normal gas-filled appendix. Mild stool burden. Mild colonic diverticulosis. Pelvis: Proximally migrated IUD within the uterus. 4.3 cm right ovarian cyst on image 154, series 2. Uterus and left adnexa otherwise grossly unremarkable. No free fluid in the pelvis. Pelvic phleboliths. No inguinal or pelvic sidewall lymphadenopathy. Peritoneum/Retroperitoneum: Atherosclerotic calcification of the aorta and branch vasculature. No retroperitoneal lymphadenopathy. Psoas muscles normal in size and symmetric in appearance. Bones/Soft Tissues: Mild degenerative changes throughout the spine. 1. 4.3 cm right ovarian cyst.  Proximally migrated IUD within the uterus. Further evaluation with pelvic ultrasound is recommended. 2. Normal gas-filled appendix. Mild colonic diverticulosis. 3. Gallbladder distension. This can be further evaluated with gallbladder ultrasound. 4. Fatty liver. 5. No evidence for small bowel obstruction.      US GALLBLADDER RUQ    Result Date: 10/21/2022  EXAMINATION: RIGHT UPPER QUADRANT ULTRASOUND 10/21/2022 1:41 pm COMPARISON: CT abdomen pelvis without contrast from 10/21/2022 HISTORY: ORDERING SYSTEM PROVIDED HISTORY: Pain, CT scan recommendation TECHNOLOGIST PROVIDED HISTORY: Pain, CT scan recommendation Reason for Exam: abd pain x 3rs 49-year-old female with abdominal pain FINDINGS: LIVER:  Increased echogenicity throughout the hepatic parenchyma consistent with fatty infiltration of the liver. Focal fatty sparing adjacent to the gallbladder fossa. No intrahepatic biliary ductal dilation. Liver length measures 26.3 cm in length. BILIARY SYSTEM:  Gallbladder is unremarkable without evidence of pericholecystic fluid, wall thickening or stones. Negative sonographic Hancock's sign. Gallbladder wall thickness measures 2 mm. Mild tenderness over the gallbladder. Common bile duct is within normal limits measuring 6 mm. RIGHT KIDNEY: Limited visualization of the right kidney demonstrates no gross right-sided hydronephrosis. PANCREAS:  Visualized portions of the pancreas are hyperechoic. OTHER: No evidence of right upper quadrant ascites. 1. Diffuse fatty infiltration of the liver. 2. Mild tenderness over the gallbladder without overt sonographic Hancock sign. 3. Otherwise, unremarkable gallbladder ultrasound. Common bile duct normal in caliber measuring 6 mm.            LABS:  Labs Reviewed   BASIC METABOLIC PANEL - Abnormal; Notable for the following components:       Result Value    Glucose 130 (*)     Bun/Cre Ratio 27 (*)     All other components within normal limits   CBC WITH AUTO DIFFERENTIAL - Abnormal; Notable for the following components:    .6 (*)     MCH 34.4 (*)     Immature Granulocytes 1 (*)     All other components within normal limits   HEPATIC FUNCTION PANEL - Abnormal; Notable for the following components:    ALT 41 (*)     AST 87 (*)     All other components within normal limits   LIPASE   URINALYSIS WITH MICROSCOPIC       All other labs were within normal range or not returned as of this dictation. EMERGENCY DEPARTMENT COURSE and DIFFERENTIAL DIAGNOSIS/MDM:   Vitals:    Vitals:    10/21/22 0957 10/21/22 1107 10/21/22 1130 10/21/22 1147   BP: (!) 162/106      Pulse: (!) 106 92  93   Resp: 18      Temp: 98.2 °F (36.8 °C)      SpO2: 96% 96% 97% 96%   Weight: 210 lb (95.3 kg)      Height: 5' 10\" (1.778 m)            MEDICATIONS GIVEN IN THE ED:  Medications   pantoprazole (PROTONIX) 40 mg in sodium chloride (PF) 10 mL injection (40 mg IntraVENous Given 10/21/22 1144)   ondansetron (ZOFRAN) injection 4 mg (4 mg IntraVENous Given 10/21/22 1144)   aluminum & magnesium hydroxide-simethicone (MAALOX) 30 mL, lidocaine viscous hcl (XYLOCAINE) 5 mL (GI COCKTAIL) ( Oral Given 10/21/22 1144)       CLINICAL DECISION MAKING:  The patient presented alert with a nontoxic appearance. Laboratory studies were unremarkable. Imaging showed diffuse fatty infiltration of the liver; a4.3 cm right ovarian cyst; gallbladder distension. The patient reported she was previously aware of the ovarian cyst; she declined a pelvic ultrasound today. Gallbladder ultrasound was completed. OARRS was reviewed. Prescriptions were written for norco, Zofran, and Carafate. The patient was advised to not drink alcohol, drive, or operate heavy machinery while taking  the norco. Follow up with pcp and GI for a recheck, further evaluation and treatment. Evaluation and treatment course in the ED, and plan of care upon discharge was discussed in length with the patient. Patient had no further questions prior to being discharged and was instructed to return to the ED for new or worsening symptoms. Care was provided during an unprecedented national emergency due to the novel coronavirus, Covid-19. FINAL IMPRESSION      1. Nausea    2.  Pain of upper abdomen            Problem List  Patient Active Problem List   Diagnosis Code    New persistent daily headache G44.52    Abnormal CT scan, head R93.0    Frequent headaches R51.9 Migraine without status migrainosus, not intractable G43.909    Class 2 obesity due to excess calories with body mass index (BMI) of 35.0 to 35.9 in adult E66.09, Z68.35    Neck muscle spasm M62.838    Encounter for smoking cessation counseling Z71.6    Gout tophi M1A. 9XX1    Essential hypertension I10    Type 2 diabetes mellitus without complication, without long-term current use of insulin (HCC) E11.9    Gastroesophageal reflux disease K21.9    Hypertriglyceridemia E78.1    Epigastric pain R10.13    Encounter for HCV screening test for high risk patient F93.31, T09.38    Helicobacter pylori gastritis K29.70, B96.81         DISPOSITION/PLAN   DISPOSITION Decision To Discharge 10/21/2022 02:29:01 PM      PATIENT REFERRED TO:   Margaux Lopez MD  [address]  [address]  [phone]    Schedule an appointment as soon as possible for a visit       Demar Babcock MD  [address] [place] [address]  [phone]    Schedule an appointment as soon as possible for a visit       [facility] ED  [address] [facility]  [phone]    If symptoms worsen, As needed    DISCHARGE MEDICATIONS:     New Prescriptions    HYDROCODONE-ACETAMINOPHEN (NORCO) 5-325 MG PER TABLET    Take 1 tablet by mouth every 8 hours as needed for Pain for up to 4 days.     ONDANSETRON (ZOFRAN ODT) 4 MG DISINTEGRATING TABLET    Take 1 tablet by mouth every 8 hours as needed for Nausea or Vomiting    SUCRALFATE (CARAFATE) 1 GM TABLET    Take 1 tablet by mouth 4 times daily           (Please note that portions of this note were completed with a voice recognition program.  Efforts were made to edit the dictations but occasionally words are mis-transcribed.)    Moiz Hardwick, [address] [facility], APRN - CNP  10/21/22 0747

## 2022-10-21 NOTE — ED PROVIDER NOTES
EMERGENCY DEPARTMENT ENCOUNTER   ATTENDING ATTESTATION     Pt Name: Thalia Winter  MRN: [id number]  Alma Rosatrongfcarmen 1980  Date of evaluation: 10/21/22   Thalia Winter is a 43 y.o. female with CC: Abdominal Pain and Nausea    MDM:   The patient is a 79-year-old female who presented to the emergency department secondary to abdominal pain with nausea and vomiting. CT abdomen pelvis concerning for gallstones as well as a migrated IUD. Patient is aware of migrated IUD, ultrasound of the right upper quadrant no acute findings. Patient is discharged home with outpatient follow-up and given parameters to return to the emergency department  This visit was performed by both a physician and an APC. I personally evaluated and examined the patient. I performed all aspects of the MDM as documented. CRITICAL CARE:       EKG: All EKG's are interpreted by the Emergency Department Physician who either signs or Co-signs this chart in the absence of a cardiologist.      RADIOLOGY:All plain film, CT, MRI, and formal ultrasound images (except ED bedside ultrasound) are read by the radiologist, see reports below, unless otherwise noted in MDM or here. US GALLBLADDER RUQ   Final Result   1. Diffuse fatty infiltration of the liver. 2. Mild tenderness over the gallbladder without overt sonographic Hancock sign. 3. Otherwise, unremarkable gallbladder ultrasound. Common bile duct normal   in caliber measuring 6 mm. CT ABDOMEN PELVIS WO CONTRAST Additional Contrast? None   Final Result   1. 4.3 cm right ovarian cyst.  Proximally migrated IUD within the uterus. Further evaluation with pelvic ultrasound is recommended. 2. Normal gas-filled appendix. Mild colonic diverticulosis. 3. Gallbladder distension. This can be further evaluated with gallbladder   ultrasound. 4. Fatty liver. 5. No evidence for small bowel obstruction.            LABS: All lab results were reviewed by myself, and all abnormals are listed below. Labs Reviewed   BASIC METABOLIC PANEL - Abnormal; Notable for the following components:       Result Value    Glucose 130 (*)     Bun/Cre Ratio 27 (*)     All other components within normal limits   CBC WITH AUTO DIFFERENTIAL - Abnormal; Notable for the following components:    .6 (*)     MCH 34.4 (*)     Immature Granulocytes 1 (*)     All other components within normal limits   HEPATIC FUNCTION PANEL - Abnormal; Notable for the following components:    ALT 41 (*)     AST 87 (*)     All other components within normal limits   LIPASE   URINALYSIS WITH MICROSCOPIC     CONSULTS:  None  FINAL IMPRESSION    No diagnosis found.         PASTMEDICAL HISTORY     Past Medical History:   Diagnosis Date    Anxiety     Arthritis     knees and hands    Class 2 obesity due to excess calories with body mass index (BMI) of 35.0 to 35.9 in adult 10/4/2018    Depression     [facility]     DIET CONTROLLED/ PT DENIES Currently    Gastroesophageal reflux disease 2/10/2022    H/O tubal ligation 2004    Headache     High cholesterol     Osteoarthritis      SURGICAL HISTORY       Past Surgical History:   Procedure Laterality Date    ARM SURGERY Left     FOOT NEUROMA SURGERY Left 8/15/2019    EXCISION LEFT HALLUX BENIGN SOFT TISSUE performed by Sydell Sandhoff, DPM at [facility]      lt forearm with plate 9 years ago    HERNIA REPAIR      TOE SURGERY Left 08/15/2019    Excision of soft tissue mass, left hallux    TUBAL LIGATION  2004    UPPER GASTROINTESTINAL ENDOSCOPY N/A 9/21/2022    EGD BIOPSY performed by Mark Wheeler MD at [place] [address]  2/24/2022    US GUIDED LIVER BIOPSY PERCUTANEOUS 2/24/2022 [facility] ULTRASOUND    WISDOM TOOTH EXTRACTION Bilateral      CURRENT MEDICATIONS       Previous Medications    ALLOPURINOL (ZYLOPRIM) 300 MG TABLET    TAKE ONE TABLET BY MOUTH DAILY    AMLODIPINE (NORVASC) 2.5 MG TABLET    TAKE ONE TABLET BY MOUTH DAILY ATORVASTATIN (LIPITOR) 40 MG TABLET    TAKE ONE TABLET BY MOUTH DAILY    DICLOFENAC SODIUM (VOLTAREN) 1 % GEL    APPLY 2 GRAMS TOPICALLY TWO TIMES A DAY    DICYCLOMINE (BENTYL) 20 MG TABLET    TAKE ONE TABLET BY MOUTH FOUR TIMES A DAY BEFORE A MEAL AS NEEDED FOR ABDOMINAL SPASMS    FENOFIBRATE (TRICOR) 48 MG TABLET    TAKE ONE TABLET BY MOUTH DAILY    OMEPRAZOLE (PRILOSEC) 40 MG DELAYED RELEASE CAPSULE    TAKE ONE CAPSULE BY MOUTH DAILY     ALLERGIES     is allergic to iodine and shellfish-derived products. FAMILY HISTORY     She indicated that her mother is alive. She indicated that her father is alive. She indicated that her brother is . She indicated that the status of her maternal grandmother is unknown. She indicated that the status of her maternal grandfather is unknown. She indicated that the status of her paternal grandmother is unknown. She indicated that the status of her paternal grandfather is unknown. SOCIAL HISTORY       Social History     Tobacco Use    Smoking status: Former     Types: Cigarettes     Quit date: 10/2018     Years since quittin.0    Smokeless tobacco: Never   Vaping Use    Vaping Use: Never used   Substance Use Topics    Alcohol use: Yes     Alcohol/week: 0.0 standard drinks     Comment: social    Drug use: No          My Andrew MD  The care is provided during an unprecedented national emergency due to the novel coronavirus, COVID 19.   Attending Emergency Physician          My Andrew MD   [id number]

## 2022-10-28 ENCOUNTER — HOSPITAL ENCOUNTER (OUTPATIENT)
Age: 42
Discharge: HOME | End: 2022-10-30
Payer: COMMERCIAL

## 2022-10-28 ENCOUNTER — HOSPITAL ENCOUNTER (OUTPATIENT)
Dept: GENERAL RADIOLOGY | Age: 42
Discharge: HOME | End: 2022-10-30
Payer: COMMERCIAL

## 2022-10-28 DIAGNOSIS — M54.2 NECK PAIN: ICD-10-CM

## 2022-10-28 PROCEDURE — 72050 X-RAY EXAM NECK SPINE 4/5VWS: CPT

## 2022-11-08 ENCOUNTER — TELEPHONE (OUTPATIENT)
Dept: FAMILY MEDICINE CLINIC | Age: 42
End: 2022-11-08

## 2022-11-08 NOTE — TELEPHONE ENCOUNTER
Pt contacted office wanting to know her Xray results. Please advise, if calling the patient its okay to leave detailed voice message per patient verbal consent.

## 2022-11-15 ENCOUNTER — TELEPHONE (OUTPATIENT)
Dept: FAMILY MEDICINE CLINIC | Age: 42
End: 2022-11-15

## 2022-11-15 NOTE — TELEPHONE ENCOUNTER
Last visit:   Last Med refill:   Does patient have enough medication for 72 hours: No:     Next Visit Date:  Future Appointments   Date Time Provider Mallory Haydee   4/24/2023  3:30 PM Roni Packer MD [facility] Maintenance   Topic Date Due    Pneumococcal 0-64 years Vaccine (1 - PCV) Never done    Diabetic retinal exam  Never done    Cervical cancer screen  04/26/2021    COVID-19 Vaccine (5 - Booster for Moderna series) 03/16/2022    Hepatitis B vaccine (3 of 3 - Risk 3-dose series) 08/07/2022    Diabetic foot exam  03/10/2023 (Originally 10/7/2021)    Flu vaccine (1) 10/03/2023 (Originally 8/1/2022)    Lipids  02/10/2023    A1C test (Diabetic or Prediabetic)  03/10/2023    Depression Screen  05/19/2023    Diabetic microalbuminuria test  05/24/2023    DTaP/Tdap/Td vaccine (2 - Td or Tdap) 03/23/2027    Hepatitis A vaccine  Completed    Hepatitis C screen  Completed    HIV screen  Completed    Hib vaccine  Aged Out    Meningococcal (ACWY) vaccine  Aged Out    Varicella vaccine  Discontinued       Hemoglobin A1C (%)   Date Value   03/10/2022 5.7   07/16/2021 5.2   10/07/2020 5.5             ( goal A1C is < 7)   Microalb/Crt.  Ratio (mcg/mg creat)   Date Value   05/24/2022 39 (H)     LDL Cholesterol (mg/dL)   Date Value   02/10/2022        10/20/2020            (goal LDL is <100)   AST (U/L)   Date Value   10/21/2022 87 (H)     ALT (U/L)   Date Value   10/21/2022 41 (H)     BUN (mg/dL)   Date Value   10/21/2022 14     BP Readings from Last 3 Encounters:   10/21/22 (!) 162/106   10/17/22 (!) 158/103   10/03/22 137/88          (goal 120/80)    All Future Testing planned in [facility]  Lab Frequency Next Occurrence   Microalbumin, Ur Once 03/10/2023   EGD Once 11/16/2022   CT CERVICAL SPINE WO CONTRAST Once 10/03/2022   Lipase Once 11/17/2022   Hepatic Function Panel Once 11/17/2022               Patient Active Problem List:     New persistent daily headache     Abnormal CT scan, head Frequent headaches     Migraine without status migrainosus, not intractable     Class 2 obesity due to excess calories with body mass index (BMI) of 35.0 to 35.9 in adult     Neck muscle spasm     Encounter for smoking cessation counseling     Gout tophi     Essential hypertension     Type 2 diabetes mellitus without complication, without long-term current use of insulin (HCC)     Gastroesophageal reflux disease     Hypertriglyceridemia     Epigastric pain     Encounter for HCV screening test for high risk patient     Helicobacter pylori gastritis           Please address the medication refill and close the encounter. If I can be of assistance, please route to the applicable pool. Thank you.

## 2022-11-18 DIAGNOSIS — M54.2 NECK PAIN: ICD-10-CM

## 2022-11-18 NOTE — TELEPHONE ENCOUNTER
Last visit:   Last Med refill:   Does patient have enough medication for 72 hours: No:     Next Visit Date:  Future Appointments   Date Time Provider Mallory Hubbard   4/24/2023  3:30 PM Mohan Foley MD [facility] Maintenance   Topic Date Due    Pneumococcal 0-64 years Vaccine (1 - PCV) Never done    Diabetic retinal exam  Never done    Cervical cancer screen  04/26/2021    COVID-19 Vaccine (5 - Booster for Moderna series) 03/16/2022    Hepatitis B vaccine (3 of 3 - Risk 3-dose series) 08/07/2022    Diabetic foot exam  03/10/2023 (Originally 10/7/2021)    Flu vaccine (1) 10/03/2023 (Originally 8/1/2022)    Lipids  02/10/2023    A1C test (Diabetic or Prediabetic)  03/10/2023    Depression Screen  05/19/2023    Diabetic microalbuminuria test  05/24/2023    DTaP/Tdap/Td vaccine (2 - Td or Tdap) 03/23/2027    Hepatitis A vaccine  Completed    Hepatitis C screen  Completed    HIV screen  Completed    Hib vaccine  Aged Out    Meningococcal (ACWY) vaccine  Aged Out    Varicella vaccine  Discontinued       Hemoglobin A1C (%)   Date Value   03/10/2022 5.7   07/16/2021 5.2   10/07/2020 5.5             ( goal A1C is < 7)   Microalb/Crt.  Ratio (mcg/mg creat)   Date Value   05/24/2022 39 (H)     LDL Cholesterol (mg/dL)   Date Value   02/10/2022        10/20/2020            (goal LDL is <100)   AST (U/L)   Date Value   10/21/2022 87 (H)     ALT (U/L)   Date Value   10/21/2022 41 (H)     BUN (mg/dL)   Date Value   10/21/2022 14     BP Readings from Last 3 Encounters:   10/21/22 (!) 162/106   10/17/22 (!) 158/103   10/03/22 137/88          (goal 120/80)    All Future Testing planned in [facility]  Lab Frequency Next Occurrence   Microalbumin, Ur Once 03/10/2023   EGD Once 11/16/2022   CT CERVICAL SPINE WO CONTRAST Once 10/03/2022   Lipase Once 11/17/2022   Hepatic Function Panel Once 11/17/2022               Patient Active Problem List:     New persistent daily headache     Abnormal CT scan, head Frequent headaches     Migraine without status migrainosus, not intractable     Class 2 obesity due to excess calories with body mass index (BMI) of 35.0 to 35.9 in adult     Neck muscle spasm     Encounter for smoking cessation counseling     Gout tophi     Essential hypertension     Type 2 diabetes mellitus without complication, without long-term current use of insulin (HCC)     Gastroesophageal reflux disease     Hypertriglyceridemia     Epigastric pain     Encounter for HCV screening test for high risk patient     Helicobacter pylori gastritis           Please address the medication refill and close the encounter. If I can be of assistance, please route to the applicable pool. Thank you.

## 2022-12-28 DIAGNOSIS — K29.70 HELICOBACTER PYLORI GASTRITIS: ICD-10-CM

## 2022-12-28 DIAGNOSIS — B96.81 HELICOBACTER PYLORI GASTRITIS: ICD-10-CM

## 2022-12-29 RX ORDER — OMEPRAZOLE 40 MG/1
CAPSULE, DELAYED RELEASE ORAL
Qty: 30 CAPSULE | Refills: 1 | Status: SHIPPED | OUTPATIENT
Start: 2022-12-29

## 2022-12-29 NOTE — TELEPHONE ENCOUNTER
E-scribe request for med refills. Please review and e-scribe if applicable. Last Visit Date:  10/3/22  Next Visit Date:  Visit date not found    Hemoglobin A1C (%)   Date Value   03/10/2022 5.7   07/16/2021 5.2   10/07/2020 5.5             ( goal A1C is < 7)   Microalb/Crt.  Ratio (mcg/mg creat)   Date Value   05/24/2022 39 (H)     LDL Cholesterol (mg/dL)   Date Value   02/10/2022            (goal LDL is <100)   AST (U/L)   Date Value   10/21/2022 87 (H)     ALT (U/L)   Date Value   10/21/2022 41 (H)     BUN (mg/dL)   Date Value   10/21/2022 14     BP Readings from Last 3 Encounters:   10/21/22 (!) 162/106   10/17/22 (!) 158/103   10/03/22 137/88          (goal 120/80)        Patient Active Problem List:     New persistent daily headache     Abnormal CT scan, head     Frequent headaches     Migraine without status migrainosus, not intractable     Class 2 obesity due to excess calories with body mass index (BMI) of 35.0 to 35.9 in adult     Neck muscle spasm     Encounter for smoking cessation counseling     Gout tophi     Essential hypertension     Type 2 diabetes mellitus without complication, without long-term current use of insulin (HCC)     Gastroesophageal reflux disease     Hypertriglyceridemia     Epigastric pain     Encounter for HCV screening test for high risk patient     Helicobacter pylori gastritis      ----Lauren Uribe

## 2023-02-02 DIAGNOSIS — E78.5 HYPERLIPIDEMIA, UNSPECIFIED HYPERLIPIDEMIA TYPE: ICD-10-CM

## 2023-02-02 RX ORDER — ATORVASTATIN CALCIUM 40 MG/1
TABLET, FILM COATED ORAL
Qty: 30 TABLET | Refills: 1 | Status: SHIPPED | OUTPATIENT
Start: 2023-02-02

## 2023-02-02 NOTE — TELEPHONE ENCOUNTER
Please address the medication refill and close the encounter. If I can be of assistance, please route to the applicable pool. Thank you. Last visit: 10-3-22  Last Med refill: 10-3-22  Does patient have enough medication for 72 hours: No:     Next Visit Date:  Future Appointments   Date Time Provider Mallory Hubbard   4/24/2023  3:30 PM Torsten Miller MD [facility] Maintenance   Topic Date Due    Pneumococcal 0-64 years Vaccine (1 - PCV) Never done    Diabetic retinal exam  Never done    Cervical cancer screen  04/26/2021    COVID-19 Vaccine (5 - Booster for Moderna series) 03/16/2022    Hepatitis B vaccine (3 of 3 - Risk 3-dose series) 08/07/2022    Lipids  02/10/2023    Diabetic foot exam  03/10/2023 (Originally 10/7/2021)    Flu vaccine (1) 10/03/2023 (Originally 8/1/2022)    A1C test (Diabetic or Prediabetic)  03/10/2023    Depression Screen  05/19/2023    Diabetic Alb to Cr ratio (uACR) test  05/24/2023    GFR test (Diabetes, CKD 3-4, OR last GFR 15-59)  10/21/2023    DTaP/Tdap/Td vaccine (2 - Td or Tdap) 03/23/2027    Hepatitis A vaccine  Completed    Hepatitis C screen  Completed    HIV screen  Completed    Hib vaccine  Aged Out    Meningococcal (ACWY) vaccine  Aged Out    Varicella vaccine  Discontinued       Hemoglobin A1C (%)   Date Value   03/10/2022 5.7   07/16/2021 5.2   10/07/2020 5.5             ( goal A1C is < 7)   Microalb/Crt.  Ratio (mcg/mg creat)   Date Value   05/24/2022 39 (H)     LDL Cholesterol (mg/dL)   Date Value   02/10/2022        10/20/2020            (goal LDL is <100)   AST (U/L)   Date Value   10/21/2022 87 (H)     ALT (U/L)   Date Value   10/21/2022 41 (H)     BUN (mg/dL)   Date Value   10/21/2022 14     BP Readings from Last 3 Encounters:   10/21/22 (!) 162/106   10/17/22 (!) 158/103   10/03/22 137/88          (goal 120/80)    All Future Testing planned in [facility]  Lab Frequency Next Occurrence   Microalbumin, Ur Once 03/10/2023   CT CERVICAL SPINE WO CONTRAST Once 10/03/2022   Lipase Once 11/17/2022   Hepatic Function Panel Once 11/17/2022               Patient Active Problem List:     New persistent daily headache     Abnormal CT scan, head     Frequent headaches     Migraine without status migrainosus, not intractable     Class 2 obesity due to excess calories with body mass index (BMI) of 35.0 to 35.9 in adult     Neck muscle spasm     Encounter for smoking cessation counseling     Gout tophi     Essential hypertension     Type 2 diabetes mellitus without complication, without long-term current use of insulin (HCC)     Gastroesophageal reflux disease     Hypertriglyceridemia     Epigastric pain     Encounter for HCV screening test for high risk patient     Helicobacter pylori gastritis

## 2023-03-09 DIAGNOSIS — B96.81 HELICOBACTER PYLORI GASTRITIS: ICD-10-CM

## 2023-03-09 DIAGNOSIS — K29.70 HELICOBACTER PYLORI GASTRITIS: ICD-10-CM

## 2023-03-09 RX ORDER — OMEPRAZOLE 40 MG/1
CAPSULE, DELAYED RELEASE ORAL
Qty: 30 CAPSULE | Refills: 1 | Status: SHIPPED | OUTPATIENT
Start: 2023-03-09

## 2023-03-09 NOTE — TELEPHONE ENCOUNTER
E-scribe request for med refills. Please review and e-scribe if applicable.     Last Visit Date:  10/3/22  Next Visit Date:  Visit date not found    Hemoglobin A1C (%)   Date Value   03/10/2022 5.7   07/16/2021 5.2   10/07/2020 5.5             ( goal A1C is < 7)   Microalb/Crt. Ratio (mcg/mg creat)   Date Value   05/24/2022 39 (H)     LDL Cholesterol (mg/dL)   Date Value   02/10/2022            (goal LDL is <100)   AST (U/L)   Date Value   10/21/2022 87 (H)     ALT (U/L)   Date Value   10/21/2022 41 (H)     BUN (mg/dL)   Date Value   10/21/2022 14     BP Readings from Last 3 Encounters:   10/21/22 (!) 162/106   10/17/22 (!) 158/103   10/03/22 137/88          (goal 120/80)        Patient Active Problem List:     New persistent daily headache     Abnormal CT scan, head     Frequent headaches     Migraine without status migrainosus, not intractable     Class 2 obesity due to excess calories with body mass index (BMI) of 35.0 to 35.9 in adult     Neck muscle spasm     Encounter for smoking cessation counseling     Gout tophi     Essential hypertension     Type 2 diabetes mellitus without complication, without long-term current use of insulin (HCC)     Gastroesophageal reflux disease     Hypertriglyceridemia     Epigastric pain     Encounter for HCV screening test for high risk patient     Helicobacter pylori gastritis      ----ABY

## 2023-04-04 DIAGNOSIS — E78.5 HYPERLIPIDEMIA, UNSPECIFIED HYPERLIPIDEMIA TYPE: ICD-10-CM

## 2023-04-04 RX ORDER — ATORVASTATIN CALCIUM 40 MG/1
TABLET, FILM COATED ORAL
Qty: 30 TABLET | Refills: 1 | Status: SHIPPED | OUTPATIENT
Start: 2023-04-04

## 2023-04-04 NOTE — TELEPHONE ENCOUNTER
List:     New persistent daily headache     Abnormal CT scan, head     Frequent headaches     Migraine without status migrainosus, not intractable     Class 2 obesity due to excess calories with body mass index (BMI) of 35.0 to 35.9 in adult     Neck muscle spasm     Encounter for smoking cessation counseling     Gout tophi     Essential hypertension     Type 2 diabetes mellitus without complication, without long-term current use of insulin (HCC)     Gastroesophageal reflux disease     Hypertriglyceridemia     Epigastric pain     Encounter for HCV screening test for high risk patient     Helicobacter pylori gastritis

## 2023-05-01 DIAGNOSIS — K29.70 HELICOBACTER PYLORI GASTRITIS: ICD-10-CM

## 2023-05-01 DIAGNOSIS — B96.81 HELICOBACTER PYLORI GASTRITIS: ICD-10-CM

## 2023-05-01 RX ORDER — OMEPRAZOLE 40 MG/1
CAPSULE, DELAYED RELEASE ORAL
Qty: 30 CAPSULE | Refills: 1 | Status: SHIPPED | OUTPATIENT
Start: 2023-05-01

## 2023-05-01 NOTE — TELEPHONE ENCOUNTER
Last visit: 10/03/2022  Last Med refill: 03/09/2023  Does patient have enough medication for 72 hours: No:     Next Visit Date:  Future Appointments   Date Time Provider Mallory Hubbard   6/13/2023  3:45 PM Daphne Conte MD [address] [place] [address] Maintenance   Topic Date Due    Pneumococcal 0-64 years Vaccine (1 - PCV) Never done    Diabetic retinal exam  Never done    Cervical cancer screen  04/26/2021    Diabetic foot exam  10/07/2021    COVID-19 Vaccine (5 - Booster for Moderna series) 03/16/2022    Hepatitis B vaccine (3 of 3 - Risk 3-dose series) 08/07/2022    Lipids  02/10/2023    A1C test (Diabetic or Prediabetic)  03/10/2023    Depression Screen  05/19/2023    Diabetic Alb to Cr ratio (uACR) test  05/24/2023    Flu vaccine (Season Ended) 10/03/2023 (Originally 8/1/2023)    GFR test (Diabetes, CKD 3-4, OR last GFR 15-59)  10/21/2023    DTaP/Tdap/Td vaccine (2 - Td or Tdap) 03/23/2027    Hepatitis C screen  Completed    HIV screen  Completed    Hepatitis A vaccine  Aged Out    Hib vaccine  Aged Out    Meningococcal (ACWY) vaccine  Aged Out    Varicella vaccine  Discontinued       Hemoglobin A1C (%)   Date Value   03/10/2022 5.7   07/16/2021 5.2   10/07/2020 5.5             ( goal A1C is < 7)   Microalb/Crt.  Ratio (mcg/mg creat)   Date Value   05/24/2022 39 (H)     LDL Cholesterol (mg/dL)   Date Value   02/10/2022        10/20/2020            (goal LDL is <100)   AST (U/L)   Date Value   10/21/2022 87 (H)     ALT (U/L)   Date Value   10/21/2022 41 (H)     BUN (mg/dL)   Date Value   10/21/2022 14     BP Readings from Last 3 Encounters:   10/21/22 (!) 162/106   10/17/22 (!) 158/103   10/03/22 137/88          (goal 120/80)    All Future Testing planned in [facility]  Lab Frequency Next Occurrence   CT CERVICAL SPINE WO CONTRAST Once 10/03/2022   Lipase Once 11/17/2022   Hepatic Function Panel Once 11/17/2022               Patient Active Problem List:     New persistent daily headache     Abnormal CT scan,

## 2023-06-13 PROBLEM — Z13.220 SCREENING FOR LIPID DISORDERS: Status: ACTIVE | Noted: 2022-02-10

## 2023-06-19 ENCOUNTER — TELEPHONE (OUTPATIENT)
Dept: FAMILY MEDICINE CLINIC | Age: 43
End: 2023-06-19

## 2023-06-19 DIAGNOSIS — E78.1 HYPERTRIGLYCERIDEMIA: ICD-10-CM

## 2023-06-19 DIAGNOSIS — E78.5 HYPERLIPIDEMIA, UNSPECIFIED HYPERLIPIDEMIA TYPE: ICD-10-CM

## 2023-06-19 NOTE — TELEPHONE ENCOUNTER
Writer received medication refills for pt     Fenofibrate  Amlodipine  Omeprazole  Atorvastatin      For pharmacy [facility]. Writer spoke to pt to see if wanted to use this pharmacy, pt stated was temporary due to [facility] [place] [facility] wasn't open. Pt has received medications and will like to continue with [facility].

## 2023-06-20 DIAGNOSIS — B96.81 HELICOBACTER PYLORI GASTRITIS: ICD-10-CM

## 2023-06-20 DIAGNOSIS — E78.1 HYPERTRIGLYCERIDEMIA: ICD-10-CM

## 2023-06-20 DIAGNOSIS — I10 ESSENTIAL HYPERTENSION: ICD-10-CM

## 2023-06-20 DIAGNOSIS — E78.5 HYPERLIPIDEMIA, UNSPECIFIED HYPERLIPIDEMIA TYPE: ICD-10-CM

## 2023-06-20 DIAGNOSIS — K29.70 HELICOBACTER PYLORI GASTRITIS: ICD-10-CM

## 2023-06-20 RX ORDER — AMLODIPINE BESYLATE 2.5 MG/1
TABLET ORAL
Qty: 90 TABLET | Refills: 1 | OUTPATIENT
Start: 2023-06-20

## 2023-06-20 RX ORDER — FENOFIBRATE 48 MG/1
TABLET, COATED ORAL
Qty: 30 TABLET | Refills: 5 | OUTPATIENT
Start: 2023-06-20

## 2023-06-20 RX ORDER — ATORVASTATIN CALCIUM 40 MG/1
TABLET, FILM COATED ORAL
Qty: 30 TABLET | Refills: 0 | Status: SHIPPED | OUTPATIENT
Start: 2023-06-20

## 2023-06-20 RX ORDER — OMEPRAZOLE 40 MG/1
CAPSULE, DELAYED RELEASE ORAL
Qty: 30 CAPSULE | Refills: 1 | OUTPATIENT
Start: 2023-06-20

## 2023-06-20 NOTE — TELEPHONE ENCOUNTER
Last visit: [id number]  Last Med refill: [id number]  Does patient have enough medication for 72 hours: No:     Pharmacy is requesting 90 day supply due to insurance     Next Visit Date:  Future Appointments   Date Time Provider Mallory Haydee   7/13/2023 10:30 AM Jessie Burch MD [id number]   7/25/2023  3:15 PM Pura Chen MD [place] Maintenance   Topic Date Due    Pneumococcal 0-64 years Vaccine (1 - PCV) Never done    Diabetic retinal exam  Never done    Cervical cancer screen  04/26/2021    COVID-19 Vaccine (5 - Booster for Moderna series) 03/16/2022    Hepatitis B vaccine (3 of 3 - Risk 3-dose series) 08/07/2022    Diabetic Alb to Cr ratio (uACR) test  05/24/2023    Flu vaccine (Season Ended) 10/03/2023 (Originally 8/1/2023)    GFR test (Diabetes, CKD 3-4, OR last GFR 15-59)  10/21/2023    Diabetic foot exam  06/13/2024    A1C test (Diabetic or Prediabetic)  06/13/2024    Lipids  06/13/2024    Depression Screen  06/13/2024    DTaP/Tdap/Td vaccine (2 - Td or Tdap) 03/23/2027    Hepatitis C screen  Completed    HIV screen  Completed    Hepatitis A vaccine  Aged Out    Hib vaccine  Aged Out    Meningococcal (ACWY) vaccine  Aged Out    Varicella vaccine  Discontinued       Hemoglobin A1C (%)   Date Value   06/13/2023 6.0   03/10/2022 5.7   07/16/2021 5.2             ( goal A1C is < 7)   Microalb/Crt.  Ratio (mcg/mg creat)   Date Value   05/24/2022 39 (H)     LDL Cholesterol (mg/dL)   Date Value   06/13/2023 Can not be calculated   02/10/2022            (goal LDL is <100)   AST (U/L)   Date Value   10/21/2022 87 (H)     ALT (U/L)   Date Value   10/21/2022 41 (H)     BUN (mg/dL)   Date Value   10/21/2022 14     BP Readings from Last 3 Encounters:   06/13/23 (!) 150/98   10/21/22 (!) 162/106   10/17/22 (!) 158/103          (goal 120/80)    All Future Testing planned in [facility]  Lab Frequency Next Occurrence   CT CERVICAL SPINE WO CONTRAST Once 10/03/2022   Lipase Once 11/17/2022

## 2023-06-29 ENCOUNTER — TELEPHONE (OUTPATIENT)
Dept: GASTROENTEROLOGY | Age: 43
End: 2023-06-29

## 2023-07-13 ENCOUNTER — HOSPITAL ENCOUNTER (OUTPATIENT)
Age: 43
Setting detail: SPECIMEN
Discharge: HOME | End: 2023-07-13

## 2023-07-13 ENCOUNTER — OFFICE VISIT (OUTPATIENT)
Dept: FAMILY MEDICINE CLINIC | Age: 43
End: 2023-07-13
Payer: COMMERCIAL

## 2023-07-13 VITALS
DIASTOLIC BLOOD PRESSURE: 87 MMHG | WEIGHT: 231.6 LBS | BODY MASS INDEX: 33.16 KG/M2 | SYSTOLIC BLOOD PRESSURE: 137 MMHG | HEART RATE: 93 BPM | OXYGEN SATURATION: 94 % | HEIGHT: 70 IN

## 2023-07-13 DIAGNOSIS — I10 ESSENTIAL HYPERTENSION: ICD-10-CM

## 2023-07-13 DIAGNOSIS — L30.4 INTERTRIGO: ICD-10-CM

## 2023-07-13 DIAGNOSIS — I10 ESSENTIAL HYPERTENSION: Primary | ICD-10-CM

## 2023-07-13 PROBLEM — Z13.220 SCREENING FOR LIPID DISORDERS: Status: RESOLVED | Noted: 2022-02-10 | Resolved: 2023-07-13

## 2023-07-13 LAB
CREAT UR-MCNC: 143.8 MG/DL (ref 28–217)
MICROALBUMIN UR-MCNC: 61 MG/L
MICROALBUMIN/CREAT UR-RTO: 42 MCG/MG CREAT

## 2023-07-13 PROCEDURE — 3078F DIAST BP <80 MM HG: CPT

## 2023-07-13 PROCEDURE — 99213 OFFICE O/P EST LOW 20 MIN: CPT

## 2023-07-13 PROCEDURE — 99211 OFF/OP EST MAY X REQ PHY/QHP: CPT | Performed by: FAMILY MEDICINE

## 2023-07-13 PROCEDURE — 3074F SYST BP LT 130 MM HG: CPT

## 2023-07-13 RX ORDER — KETOCONAZOLE 20 MG/G
CREAM TOPICAL
Qty: 30 G | Refills: 1 | Status: SHIPPED | OUTPATIENT
Start: 2023-07-13

## 2023-07-13 SDOH — ECONOMIC STABILITY: HOUSING INSECURITY
IN THE LAST 12 MONTHS, WAS THERE A TIME WHEN YOU DID NOT HAVE A STEADY PLACE TO SLEEP OR SLEPT IN A SHELTER (INCLUDING NOW)?: NO

## 2023-07-13 SDOH — ECONOMIC STABILITY: INCOME INSECURITY: HOW HARD IS IT FOR YOU TO PAY FOR THE VERY BASICS LIKE FOOD, HOUSING, MEDICAL CARE, AND HEATING?: NOT VERY HARD

## 2023-07-13 SDOH — ECONOMIC STABILITY: FOOD INSECURITY: WITHIN THE PAST 12 MONTHS, YOU WORRIED THAT YOUR FOOD WOULD RUN OUT BEFORE YOU GOT MONEY TO BUY MORE.: NEVER TRUE

## 2023-07-13 SDOH — ECONOMIC STABILITY: FOOD INSECURITY: WITHIN THE PAST 12 MONTHS, THE FOOD YOU BOUGHT JUST DIDN'T LAST AND YOU DIDN'T HAVE MONEY TO GET MORE.: NEVER TRUE

## 2023-07-13 ASSESSMENT — ENCOUNTER SYMPTOMS
GASTROINTESTINAL NEGATIVE: 1
EYES NEGATIVE: 1
RESPIRATORY NEGATIVE: 1

## 2023-07-13 ASSESSMENT — PATIENT HEALTH QUESTIONNAIRE - PHQ9
SUM OF ALL RESPONSES TO PHQ9 QUESTIONS 1 & 2: 0
SUM OF ALL RESPONSES TO PHQ QUESTIONS 1-9: 0
2. FEELING DOWN, DEPRESSED OR HOPELESS: 0
SUM OF ALL RESPONSES TO PHQ QUESTIONS 1-9: 0
1. LITTLE INTEREST OR PLEASURE IN DOING THINGS: 0

## 2023-07-13 NOTE — PATIENT INSTRUCTIONS
Thank you for letting us take care of you today. We hope all your questions were addressed. If a question was overlooked or something else comes to mind after you return home, please contact a member of your Care Team listed below. Your Care Team at [address] [place] [address] is Team #2  Bernardo Ratliff M.D. (Faculty)  Gustave Bumpers, (Resident)  Venessa Forde, (Resident)  Jannet Stanton, (Resident)  Alea Mackey, (Resident)  Liliana Ruiz, (Resident)  Dasia Lee, [address] [place] [address], [facility]  Giovanni Blanco,  Pending sale to [facility]  Lolis Petersen, [facility]  Jazmyne Alexander, Pending sale to [facility]  Rollin Cockayne, [facility]  Karen Blount) [place], [place] ([address])  Ana Goss, [facility] (Clinical Pharmacist)     Office phone number: [phone]    If you need to get in right away due to illness, please be advised we have \"Same Day\" appointments available Monday-Friday. Please call us at [phone] option #3 to schedule your \"Same Day\" appointment.

## 2023-07-13 NOTE — PROGRESS NOTES
HYPERTENSION visit     BP Readings from Last 3 Encounters:   06/13/23 (!) 150/98   10/21/22 (!) 162/106   10/17/22 (!) 158/103       LDL Cholesterol (mg/dL)   Date Value   06/13/2023 Can not be calculated     HDL (mg/dL)   Date Value   06/13/2023 43     BUN (mg/dL)   Date Value   10/21/2022 14     Creatinine (mg/dL)   Date Value   10/21/2022 0.52     Glucose (mg/dL)   Date Value   10/21/2022 130 (H)   12/20/2011 86              Have you changed or started any medications since your last visit including any over-the-counter medicines, vitamins, or herbal medicines? no   Have you stopped taking any of your medications? Is so, why? -  no  Are you having any side effects from any of your medications? - no  How often do you miss doses of your medication? no      Have you seen any other physician or provider since your last visit?  no   Have you had any other diagnostic tests since your last visit?  no   Have you been seen in the emergency room and/or had an admission in a hospital since we last saw you?  no   Have you had your routine dental cleaning in the past 6 months?  no     Do you have an active MyChart account? If no, what is the barrier?   Yes    Patient Care Team:  Isabel Chen MD as PCP - General (Family Medicine)    Medical History Review  Past Medical, Family, and Social History reviewed and does not contribute to the patient presenting condition    Health Maintenance   Topic Date Due    Pneumococcal 0-64 years Vaccine (1 - PCV) Never done    Diabetic retinal exam  Never done    Cervical cancer screen  04/26/2021    COVID-19 Vaccine (5 - Booster for Moderna series) 03/16/2022    Hepatitis B vaccine (3 of 3 - Risk 3-dose series) 08/07/2022    Diabetic Alb to Cr ratio (uACR) test  05/24/2023    Flu vaccine (1) 08/01/2023    GFR test (Diabetes, CKD 3-4, OR last GFR 15-59)  10/21/2023    Diabetic foot exam  06/13/2024    A1C test (Diabetic or Prediabetic)  06/13/2024    Lipids  06/13/2024    Depression Screen

## 2023-07-13 NOTE — PROGRESS NOTES
Attending Physician Statement  I have discussed the care of ShelleyWilliamsincluding pertinent history and exam findings,  with the resident. I have reviewed the key elements of all parts of the encounter with the resident. I agree with the assessment, plan and orders as documented by the resident.   (GE Modifier)    HTN- controlled  Tinea Corporis- Ketoconazole cream written

## 2023-08-30 DIAGNOSIS — I10 ESSENTIAL HYPERTENSION: ICD-10-CM

## 2023-08-30 RX ORDER — AMLODIPINE BESYLATE 5 MG/1
TABLET ORAL
Qty: 30 TABLET | Refills: 3 | Status: SHIPPED | OUTPATIENT
Start: 2023-08-30

## 2023-08-30 NOTE — TELEPHONE ENCOUNTER
Last visit: 07/13/2023  Last Med refill: 06/13/2023  Does patient have enough medication for 72 hours: No:     Next Visit Date:  Future Appointments   Date Time Provider [address]   9/13/2023  3:00 PM Hiram Cervantes MD [facility]   10/5/2023  9:20 AM Elmira Pizano MD [address] [place] [address],[place] [address] Maintenance   Topic Date Due    Pneumococcal 0-64 years Vaccine (1 - PCV) Never done    Diabetic retinal exam  Never done    COVID-19 Vaccine (5 - Booster for Moderna series) 03/16/2022    Hepatitis B vaccine (3 of 3 - Risk 3-dose series) 08/07/2022    Flu vaccine (1) 08/01/2023    GFR test (Diabetes, CKD 3-4, OR last GFR 15-59)  10/21/2023    Cervical cancer screen  11/04/2023    Diabetic foot exam  06/13/2024    A1C test (Diabetic or Prediabetic)  06/13/2024    Lipids  06/13/2024    Diabetic Alb to Cr ratio (uACR) test  07/13/2024    Depression Screen  07/13/2024    DTaP/Tdap/Td vaccine (2 - Td or Tdap) 03/23/2027    Hepatitis C screen  Completed    HIV screen  Completed    Hepatitis A vaccine  Aged Out    Hib vaccine  Aged Out    Meningococcal (ACWY) vaccine  Aged Out    Varicella vaccine  Discontinued       Hemoglobin A1C (%)   Date Value   06/13/2023 6.0   03/10/2022 5.7   07/16/2021 5.2             ( goal A1C is < 7)   No components found for: LABMICR  LDL Cholesterol (mg/dL)   Date Value   06/13/2023 Can not be calculated   02/10/2022            (goal LDL is <100)   AST (U/L)   Date Value   10/21/2022 87 (H)     ALT (U/L)   Date Value   10/21/2022 41 (H)     BUN (mg/dL)   Date Value   10/21/2022 14     BP Readings from Last 3 Encounters:   07/13/23 137/87   06/13/23 (!) 150/98   10/21/22 (!) 162/106          (goal 120/80)    All Future Testing planned in [facility]  Lab Frequency Next Occurrence   CT CERVICAL SPINE WO CONTRAST Once 10/03/2022   Lipase Once 11/17/2022   Hepatic Function Panel Once 11/17/2022               Patient Active Problem List:     New persistent daily headache

## 2023-09-09 ENCOUNTER — HOSPITAL ENCOUNTER (OUTPATIENT)
Age: 43
Discharge: HOME | End: 2023-09-09
Payer: COMMERCIAL

## 2023-09-09 DIAGNOSIS — R16.0 HEPATOMEGALY: ICD-10-CM

## 2023-09-09 LAB
ALBUMIN SERPL-MCNC: 4.7 G/DL (ref 3.5–5.2)
ALBUMIN/GLOB SERPL: 1.6 {RATIO} (ref 1–2.5)
ALP SERPL-CCNC: 104 U/L (ref 35–104)
ALT SERPL-CCNC: 51 U/L (ref 5–33)
AST SERPL-CCNC: 93 U/L
BILIRUB DIRECT SERPL-MCNC: 0.3 MG/DL
BILIRUB INDIRECT SERPL-MCNC: 0.7 MG/DL (ref 0–1)
BILIRUB SERPL-MCNC: 1 MG/DL (ref 0.3–1.2)
LIPASE SERPL-CCNC: 30 U/L (ref 13–60)
PROT SERPL-MCNC: 7.6 G/DL (ref 6.4–8.3)

## 2023-09-09 PROCEDURE — 80076 HEPATIC FUNCTION PANEL: CPT

## 2023-09-09 PROCEDURE — 36415 COLL VENOUS BLD VENIPUNCTURE: CPT

## 2023-09-09 PROCEDURE — 83690 ASSAY OF LIPASE: CPT

## 2023-09-13 ENCOUNTER — OFFICE VISIT (OUTPATIENT)
Dept: GASTROENTEROLOGY | Age: 43
End: 2023-09-13
Payer: COMMERCIAL

## 2023-09-13 VITALS
BODY MASS INDEX: 33.82 KG/M2 | HEART RATE: 107 BPM | WEIGHT: 236.2 LBS | HEIGHT: 70 IN | TEMPERATURE: 98.1 F | SYSTOLIC BLOOD PRESSURE: 162 MMHG | DIASTOLIC BLOOD PRESSURE: 99 MMHG

## 2023-09-13 DIAGNOSIS — R16.0 HEPATOMEGALY: Primary | ICD-10-CM

## 2023-09-13 DIAGNOSIS — F10.21 HISTORY OF ALCOHOLISM (HCC): ICD-10-CM

## 2023-09-13 PROCEDURE — 3080F DIAST BP >= 90 MM HG: CPT | Performed by: INTERNAL MEDICINE

## 2023-09-13 PROCEDURE — 99214 OFFICE O/P EST MOD 30 MIN: CPT | Performed by: INTERNAL MEDICINE

## 2023-09-13 PROCEDURE — 3077F SYST BP >= 140 MM HG: CPT | Performed by: INTERNAL MEDICINE

## 2023-09-13 ASSESSMENT — ENCOUNTER SYMPTOMS
BACK PAIN: 0
WHEEZING: 0
CHOKING: 0
TROUBLE SWALLOWING: 0
ABDOMINAL DISTENTION: 1
VOMITING: 0
NAUSEA: 0
VOICE CHANGE: 0
SINUS PRESSURE: 0
BLOOD IN STOOL: 0
CONSTIPATION: 0
DIARRHEA: 1
RECTAL PAIN: 0
ABDOMINAL PAIN: 1
COUGH: 0
ANAL BLEEDING: 0
SORE THROAT: 0

## 2023-09-13 NOTE — PROGRESS NOTES
tenderness. There is no rebound. Hernia: No hernia is present. Comments: No referral signs of chronic liver disease. Has hepatomegaly. Musculoskeletal:         General: No tenderness. Lymphadenopathy:      Cervical: No cervical adenopathy. Skin:     General: Skin is warm and dry. Findings: No bruising, ecchymosis, erythema or rash. Neurological:      Mental Status: She is alert and oriented to person, place, and time. Cranial Nerves: No cranial nerve deficit. Psychiatric:         Mood and Affect: Mood normal.         Behavior: Behavior normal.         Thought Content: Thought content normal.           LABORATORY DATA: Reviewed  Lab Results   Component Value Date    WBC 5.3 10/21/2022    HGB 14.2 10/21/2022    HCT 43.2 10/21/2022    .6 (H) 10/21/2022     10/21/2022     10/21/2022    K 4.0 10/21/2022     10/21/2022    CO2 22 10/21/2022    BUN 14 10/21/2022    CREATININE 0.52 10/21/2022    LABALBU 4.7 09/09/2023    BILITOT 1.0 09/09/2023    ALKPHOS 104 09/09/2023    AST 93 (H) 09/09/2023    ALT 51 (H) 09/09/2023    INR 1.3 02/24/2022         Lab Results   Component Value Date    RBC 4.13 10/21/2022    HGB 14.2 10/21/2022    .6 (H) 10/21/2022    MCH 34.4 (H) 10/21/2022    MCHC 32.9 10/21/2022    RDW 13.8 10/21/2022    MPV 9.4 10/21/2022    BASOPCT 1 10/21/2022    LYMPHSABS 2.21 10/21/2022    MONOSABS 0.25 10/21/2022    NEUTROABS 2.65 10/21/2022    EOSABS 0.07 10/21/2022    BASOSABS 0.05 10/21/2022         DIAGNOSTIC TESTING:     No results found. Assessment  1. Hepatomegaly    2. History of alcoholism [facility])        Plan  Discussed with the patient regarding labs. Advised to cut down drinking and eventually stop. Explained that her liver may regenerate and revert back to normal function if she stopped drinking alcohol. Patient understood and agreed. We will repeat labs in January 2024. Patient to see us at that time.     Thank you for allowing me

## 2023-10-03 DIAGNOSIS — E78.5 HYPERLIPIDEMIA, UNSPECIFIED HYPERLIPIDEMIA TYPE: ICD-10-CM

## 2023-10-03 RX ORDER — ATORVASTATIN CALCIUM 40 MG/1
TABLET, FILM COATED ORAL
Qty: 30 TABLET | Refills: 3 | Status: SHIPPED | OUTPATIENT
Start: 2023-10-03

## 2023-10-03 NOTE — TELEPHONE ENCOUNTER
Last visit: 07/13/2023  Last Med refill: 06/20/2023  Does patient have enough medication for 72 hours: No:     Next Visit Date:  Future Appointments   Date Time Provider [address]   10/26/2023  3:20 PM Shad Brown  [facility] Maintenance   Topic Date Due    Pneumococcal 0-64 years Vaccine (1 - PCV) Never done    Diabetic retinal exam  Never done    COVID-19 Vaccine (5 - Moderna series) 03/16/2022    Hepatitis B vaccine (3 of 3 - 19+ 3-dose series) 09/10/2022    Flu vaccine (1) 08/01/2023    GFR test (Diabetes, CKD 3-4, OR last GFR 15-59)  10/21/2023    Cervical cancer screen  11/04/2023    Diabetic foot exam  06/13/2024    A1C test (Diabetic or Prediabetic)  06/13/2024    Lipids  06/13/2024    Diabetic Alb to Cr ratio (uACR) test  07/13/2024    Depression Screen  07/13/2024    DTaP/Tdap/Td vaccine (2 - Td or Tdap) 03/23/2027    Hepatitis C screen  Completed    HIV screen  Completed    Hepatitis A vaccine  Aged Out    Hib vaccine  Aged Out    HPV vaccine  Aged Out    Meningococcal (ACWY) vaccine  Aged Out    Varicella vaccine  Discontinued       Hemoglobin A1C (%)   Date Value   06/13/2023 6.0   03/10/2022 5.7   07/16/2021 5.2             ( goal A1C is < 7)   No components found for: \"LABMICR\"  LDL Cholesterol (mg/dL)   Date Value   06/13/2023 Can not be calculated   02/10/2022            (goal LDL is <100)   AST (U/L)   Date Value   09/09/2023 93 (H)     ALT (U/L)   Date Value   09/09/2023 51 (H)     BUN (mg/dL)   Date Value   10/21/2022 14     BP Readings from Last 3 Encounters:   09/13/23 (!) 162/99   07/13/23 137/87   06/13/23 (!) 150/98          (goal 120/80)    All Future Testing planned in [facility]  Lab Frequency Next Occurrence   CT CERVICAL SPINE WO CONTRAST Once 10/03/2022   CBC with Auto Differential Once 09/13/2023   Comprehensive Metabolic Panel Once 72/88/0654   Hepatic Function Panel Once 09/13/2023   CHANTELL Once 09/13/2023   MITOCHONDRIAL ANTIBODIES, M2, IGG Once

## 2023-10-26 ENCOUNTER — OFFICE VISIT (OUTPATIENT)
Dept: FAMILY MEDICINE CLINIC | Age: 43
End: 2023-10-26
Payer: COMMERCIAL

## 2023-10-26 VITALS
BODY MASS INDEX: 34.3 KG/M2 | SYSTOLIC BLOOD PRESSURE: 150 MMHG | WEIGHT: 239.6 LBS | HEIGHT: 70 IN | DIASTOLIC BLOOD PRESSURE: 103 MMHG | HEART RATE: 102 BPM

## 2023-10-26 DIAGNOSIS — R42 VERTIGO: Primary | ICD-10-CM

## 2023-10-26 DIAGNOSIS — K21.9 GASTROESOPHAGEAL REFLUX DISEASE, UNSPECIFIED WHETHER ESOPHAGITIS PRESENT: ICD-10-CM

## 2023-10-26 PROCEDURE — 3078F DIAST BP <80 MM HG: CPT

## 2023-10-26 PROCEDURE — 99213 OFFICE O/P EST LOW 20 MIN: CPT

## 2023-10-26 PROCEDURE — 3074F SYST BP LT 130 MM HG: CPT

## 2023-10-26 RX ORDER — MECLIZINE HYDROCHLORIDE 25 MG/1
25 TABLET ORAL 2 TIMES DAILY
Qty: 15 TABLET | Refills: 0 | Status: SHIPPED | OUTPATIENT
Start: 2023-10-26

## 2023-10-26 RX ORDER — OMEPRAZOLE 40 MG/1
40 CAPSULE, DELAYED RELEASE ORAL DAILY
Qty: 30 CAPSULE | Refills: 1 | Status: SHIPPED | OUTPATIENT
Start: 2023-10-26

## 2023-10-26 ASSESSMENT — PATIENT HEALTH QUESTIONNAIRE - PHQ9
1. LITTLE INTEREST OR PLEASURE IN DOING THINGS: 0
SUM OF ALL RESPONSES TO PHQ QUESTIONS 1-9: 0
SUM OF ALL RESPONSES TO PHQ QUESTIONS 1-9: 0
SUM OF ALL RESPONSES TO PHQ9 QUESTIONS 1 & 2: 0
SUM OF ALL RESPONSES TO PHQ QUESTIONS 1-9: 0
2. FEELING DOWN, DEPRESSED OR HOPELESS: 0
SUM OF ALL RESPONSES TO PHQ QUESTIONS 1-9: 0

## 2023-10-26 NOTE — PROGRESS NOTES
Visit Information    Have you changed or started any medications since your last visit including any over-the-counter medicines, vitamins, or herbal medicines? no   Are you having any side effects from any of your medications? -  no  Have you stopped taking any of your medications? Is so, why? -  no    Have you seen any other physician or provider since your last visit? Yes, GI   Have you had any other diagnostic tests since your last visit? Yes - Records Requested lab work  Have you been seen in the emergency room and/or had an admission to a hospital since we last saw you? No  Have you had your routine dental cleaning in the past 6 months? no    Have you activated your [employer] account? If not, what are your barriers?  Yes     Patient Care Team:  Davis Pimentel MD as PCP - General (Family Medicine)  Solange Mills MD as PCP - Empaneled Provider    Medical History Review  Past Medical, Family, and Social History reviewed and does not contribute to the patient presenting condition    Health Maintenance   Topic Date Due    Pneumococcal 0-64 years Vaccine (1 - PCV) Never done    Diabetic retinal exam  Never done    COVID-19 Vaccine (5 - Moderna series) 03/16/2022    Hepatitis B vaccine (3 of 3 - 19+ 3-dose series) 09/10/2022    Flu vaccine (1) 08/01/2023    GFR test (Diabetes, CKD 3-4, OR last GFR 15-59)  10/21/2023    Cervical cancer screen  11/04/2023    Diabetic foot exam  06/13/2024    A1C test (Diabetic or Prediabetic)  06/13/2024    Lipids  06/13/2024    Diabetic Alb to Cr ratio (uACR) test  07/13/2024    Depression Screen  07/13/2024    DTaP/Tdap/Td vaccine (2 - Td or Tdap) 03/23/2027    Hepatitis C screen  Completed    HIV screen  Completed    Hepatitis A vaccine  Aged Out    Hib vaccine  Aged Out    HPV vaccine  Aged Out    Meningococcal (ACWY) vaccine  Aged Out    Varicella vaccine  Discontinued

## 2023-10-26 NOTE — PROGRESS NOTES
[facility] - Outpatient Note      Subjective:    Annita Parker is a 37 y.o. female with  has a past medical history of Anxiety, Arthritis, Class 2 obesity due to excess calories with body mass index (BMI) of 35.0 to 35.9 in adult, Depression, Diabetes mellitus ([address]), Gastroesophageal reflux disease, H/O tubal ligation, Headache, High cholesterol, and Osteoarthritis. Presented to the office today for:  Chief Complaint   Patient presents with    Hypertension     Follow up    Dizziness     Patient states when laying down in bed she feels dizzy as the room is spinning        HPI    HTN  -Not well controlled  -/93  -Reports Home readings 130s/80s  -Amlodipine 5 mg   -Will not make any change at this time, asked patient to record her home BP readings and follow up back, if elevated readings than consider increasing medication dose. Vertigo - Dizziness  Happening for the First time, reports no injury, hit, trauma to head, presents with intermittent vertigo, explains as if the room is spinning. It has been present for 3 months. The patient describes the symptoms as vertigo and dizziness. Symptoms are exacerbated by rising from squatting or sitting position or moving her head backwards. The patient also complains of aural pressure bilaterally which is same/unchanged, hears buzzing in her ears sometimes  No otalgia   No otorrhea   No reported hearing loss   No vomiting, fever, chills. Hx of migraines  No phonophobia   Reports Photophobia sometimes  No loss of balance, syncope, weakness, loss of sensation  She has not been taking OTC analgesics    Orthostats negative in the clinic      Review of Systems   Constitutional:  Negative for chills and fever. Eyes: Negative. Respiratory: Negative. Cardiovascular: Negative. Gastrointestinal:  Negative for nausea and vomiting.    Neurological:  Positive for dizziness, light-headedness and

## 2023-10-27 ASSESSMENT — ENCOUNTER SYMPTOMS
EYES NEGATIVE: 1
VOMITING: 0
RESPIRATORY NEGATIVE: 1
NAUSEA: 0

## 2023-11-27 DIAGNOSIS — I10 ESSENTIAL HYPERTENSION: ICD-10-CM

## 2023-11-28 RX ORDER — AMLODIPINE BESYLATE 5 MG/1
TABLET ORAL
Qty: 90 TABLET | Refills: 2 | Status: SHIPPED | OUTPATIENT
Start: 2023-11-28

## 2023-11-28 NOTE — PROGRESS NOTES
[address] [facility] [address] [facility],  [address]  Tel: [phone]   Fax: [phone]    Subjective     CC: S/p left hallux soft tissue lesion excision    HPI:  Kaylyn Chu is a 44y.o. year old female who presents to clinic today status post IPJ gouty tophi excision (DOS: 8/15/19). Gouty tophi found in surgical path. Patient denies any past incident of gout flare-up. She admits to eating red meat and fish and drinking beer 3 times a week. Patient states her pain is improved since surgery, worse when she is ambulating for long periods of time. She admits to keeping the dressing clean, dry, intact since surgery. She admits to ambulating in surgical shoe at all times. She denies any nausea, vomiting, fever, chills. Primary care physician is José Miguel Griggs MD.    ROS:    Constitutional: Denies nausea, vomiting, fever, chills. Neurologic: Denies numbness, tingling, and burning in the feet. Vascular: Denies symptoms of lower extremity claudication. Skin: Denies open wounds. Otherwise negative except as noted in the HPI.      PMH:  Past Medical History:   Diagnosis Date    Anxiety     Arthritis     knees and hands    Class 2 obesity due to excess calories with body mass index (BMI) of 35.0 to 35.9 in adult 10/4/2018    Depression     Diabetes mellitus ([facility] [address]     DIET CONTROLLED    H/O tubal ligation 2004    Headache     High cholesterol     Osteoarthritis        Surgical History:   Past Surgical History:   Procedure Laterality Date    ARM SURGERY Left     FOOT NEUROMA SURGERY Left 8/15/2019    EXCISION LEFT HALLUX BENIGN SOFT TISSUE performed by Vasile Hill DPM at [address] [facility] forearm with plate 9 years ago    TOE SURGERY Left 08/15/2019    Excision of soft tissue mass, left hallux    TUBAL LIGATION  2004    WISDOM TOOTH EXTRACTION Bilateral        Social History:  Social History     Tobacco Use    Smoking status: Former Smoker
Patient instructed to remove shoes and socks, instructed to sit in exam chair. Current PCP name is [place] [facility] and date of last visit 7/29/19. Do you have a follow up visit scheduled?   Yes or no    If yes the date is 9/6/19
PAST MEDICAL HISTORY:  No pertinent past medical history

## 2023-11-28 NOTE — TELEPHONE ENCOUNTER
Please address the medication refill and close the encounter.  If I can be of assistance, please route to the applicable pool.      Thank you.    Last visit: 10-26-23  Last Med refill: 8-30-23  Does patient have enough medication for 72 hours: No:     Next Visit Date:  No future appointments.    Health Maintenance   Topic Date Due    Pneumococcal 0-64 years Vaccine (1 - PCV) Never done    Diabetic retinal exam  Never done    Hepatitis B vaccine (3 of 3 - 19+ 3-dose series) 09/10/2022    Flu vaccine (1) 08/01/2023    COVID-19 Vaccine (5 - 2023-24 season) 09/01/2023    GFR test (Diabetes, CKD 3-4, OR last GFR 15-59)  10/21/2023    Cervical cancer screen  11/04/2023    Diabetic foot exam  06/13/2024    A1C test (Diabetic or Prediabetic)  06/13/2024    Lipids  06/13/2024    Diabetic Alb to Cr ratio (uACR) test  07/13/2024    Depression Screen  10/26/2024    DTaP/Tdap/Td vaccine (2 - Td or Tdap) 03/23/2027    Hepatitis C screen  Completed    HIV screen  Completed    Hepatitis A vaccine  Aged Out    Hib vaccine  Aged Out    HPV vaccine  Aged Out    Meningococcal (ACWY) vaccine  Aged Out    Varicella vaccine  Discontinued       Hemoglobin A1C (%)   Date Value   06/13/2023 6.0   03/10/2022 5.7   07/16/2021 5.2             ( goal A1C is < 7)   No components found for: \"LABMICR\"  LDL Cholesterol (mg/dL)   Date Value   06/13/2023 Can not be calculated   02/10/2022            (goal LDL is <100)   AST (U/L)   Date Value   09/09/2023 93 (H)     ALT (U/L)   Date Value   09/09/2023 51 (H)     BUN (mg/dL)   Date Value   10/21/2022 14     BP Readings from Last 3 Encounters:   10/26/23 (!) 150/103   09/13/23 (!) 162/99   07/13/23 137/87          (goal 120/80)    All Future Testing planned in [facility]  Lab Frequency Next Occurrence   CBC with Auto Differential Once 09/13/2023   Comprehensive Metabolic Panel Once 09/13/2023   Hepatic Function Panel Once 09/13/2023   CHANTELL Once 09/13/2023   MITOCHONDRIAL ANTIBODIES, M2, IGG Once 09/13/2023

## 2023-12-14 DIAGNOSIS — K21.9 GASTROESOPHAGEAL REFLUX DISEASE, UNSPECIFIED WHETHER ESOPHAGITIS PRESENT: ICD-10-CM

## 2023-12-14 RX ORDER — OMEPRAZOLE 40 MG/1
40 CAPSULE, DELAYED RELEASE ORAL DAILY
Qty: 30 CAPSULE | Refills: 1 | Status: SHIPPED | OUTPATIENT
Start: 2023-12-14

## 2023-12-14 NOTE — TELEPHONE ENCOUNTER
Please address the medication refill and close the encounter. If I can be of assistance, please route to the applicable pool. Thank you. Last visit: 10-26-23  Last Med refill: 10-26-23  Does patient have enough medication for 72 hours: No:     Next Visit Date:  No future appointments.     Health Maintenance   Topic Date Due    Pneumococcal 0-64 years Vaccine (1 - PCV) Never done    Diabetic retinal exam  Never done    Hepatitis B vaccine (3 of 3 - 19+ 3-dose series) 09/10/2022    Flu vaccine (1) 08/01/2023    COVID-19 Vaccine (5 - 2023-24 season) 09/01/2023    GFR test (Diabetes, CKD 3-4, OR last GFR 15-59)  10/21/2023    Cervical cancer screen  11/04/2023    Diabetic foot exam  06/13/2024    A1C test (Diabetic or Prediabetic)  06/13/2024    Lipids  06/13/2024    Diabetic Alb to Cr ratio (uACR) test  07/13/2024    Depression Screen  10/26/2024    DTaP/Tdap/Td vaccine (2 - Td or Tdap) 03/23/2027    Hepatitis C screen  Completed    HIV screen  Completed    Hepatitis A vaccine  Aged Out    Hib vaccine  Aged Out    HPV vaccine  Aged Out    Meningococcal (ACWY) vaccine  Aged Out    Varicella vaccine  Discontinued       Hemoglobin A1C (%)   Date Value   06/13/2023 6.0   03/10/2022 5.7   07/16/2021 5.2             ( goal A1C is < 7)   No components found for: \"LABMICR\"  LDL Cholesterol (mg/dL)   Date Value   06/13/2023 Can not be calculated   02/10/2022            (goal LDL is <100)   AST (U/L)   Date Value   09/09/2023 93 (H)     ALT (U/L)   Date Value   09/09/2023 51 (H)     BUN (mg/dL)   Date Value   10/21/2022 14     BP Readings from Last 3 Encounters:   10/26/23 (!) 150/103   09/13/23 (!) 162/99   07/13/23 137/87          (goal 120/80)    All Future Testing planned in [facility]  Lab Frequency Next Occurrence   CBC with Auto Differential Once 09/13/2023   Comprehensive Metabolic Panel Once 53/19/3170   Hepatic Function Panel Once 09/13/2023   CHANTELL Once 09/13/2023   MITOCHONDRIAL ANTIBODIES, M2, IGG Once

## 2024-01-29 DIAGNOSIS — E78.5 HYPERLIPIDEMIA, UNSPECIFIED HYPERLIPIDEMIA TYPE: ICD-10-CM

## 2024-01-29 RX ORDER — ATORVASTATIN CALCIUM 40 MG/1
TABLET, FILM COATED ORAL
Qty: 60 TABLET | Refills: 5 | Status: SHIPPED | OUTPATIENT
Start: 2024-01-29

## 2024-01-29 NOTE — TELEPHONE ENCOUNTER
Last visit: 10/26/2023  Last Med refill: 10/03/2023  Does patient have enough medication for 72 hours: No:     Next Visit Date:  No future appointments.    Health Maintenance   Topic Date Due    Pneumococcal 0-64 years Vaccine (1 - PCV) Never done    Diabetic retinal exam  Never done    Hepatitis B vaccine (3 of 3 - 19+ 3-dose series) 09/10/2022    Flu vaccine (1) 08/01/2023    COVID-19 Vaccine (5 - 2023-24 season) 09/01/2023    GFR test (Diabetes, CKD 3-4, OR last GFR 15-59)  10/21/2023    Cervical cancer screen  11/04/2023    Diabetic foot exam  06/13/2024    A1C test (Diabetic or Prediabetic)  06/13/2024    Lipids  06/13/2024    Diabetic Alb to Cr ratio (uACR) test  07/13/2024    Depression Screen  10/26/2024    DTaP/Tdap/Td vaccine (2 - Td or Tdap) 03/23/2027    Hepatitis C screen  Completed    HIV screen  Completed    Hepatitis A vaccine  Aged Out    Hib vaccine  Aged Out    HPV vaccine  Aged Out    Polio vaccine  Aged Out    Meningococcal (ACWY) vaccine  Aged Out    Varicella vaccine  Discontinued       Hemoglobin A1C (%)   Date Value   06/13/2023 6.0   03/10/2022 5.7   07/16/2021 5.2             ( goal A1C is < 7)   No components found for: \"LABMICR\"  LDL Cholesterol (mg/dL)   Date Value   06/13/2023 Can not be calculated   02/10/2022            (goal LDL is <100)   AST (U/L)   Date Value   09/09/2023 93 (H)     ALT (U/L)   Date Value   09/09/2023 51 (H)     BUN (mg/dL)   Date Value   10/21/2022 14     BP Readings from Last 3 Encounters:   10/26/23 (!) 150/103   09/13/23 (!) 162/99   07/13/23 137/87          (goal 120/80)    All Future Testing planned in [facility]  Lab Frequency Next Occurrence   Comprehensive Metabolic Panel Once 09/13/2023   Hepatic Function Panel Once 09/13/2023   CHANTELL Once 09/13/2023   MITOCHONDRIAL ANTIBODIES, M2, IGG Once 09/13/2023   Ceruloplasmin Once 09/13/2023   Alpha-1-Antitrypsin w Phenotype Once 09/13/2023               Patient Active Problem List:     New persistent daily

## 2024-02-10 DIAGNOSIS — K21.9 GASTROESOPHAGEAL REFLUX DISEASE, UNSPECIFIED WHETHER ESOPHAGITIS PRESENT: ICD-10-CM

## 2024-02-12 RX ORDER — OMEPRAZOLE 40 MG/1
40 CAPSULE, DELAYED RELEASE ORAL DAILY
Qty: 30 CAPSULE | Refills: 1 | Status: SHIPPED | OUTPATIENT
Start: 2024-02-12

## 2024-02-12 NOTE — TELEPHONE ENCOUNTER
E-scribe request for [facility]. Please review and e-scribe if applicable.     Last Visit Date:  10/26/2023  Next Visit Date:  Visit date not found    Hemoglobin A1C (%)   Date Value   06/13/2023 6.0   03/10/2022 5.7   07/16/2021 5.2             ( goal A1C is < 7)   No components found for: \"LABMICR\"  LDL Cholesterol (mg/dL)   Date Value   06/13/2023 Can not be calculated       (goal LDL is <100)   AST (U/L)   Date Value   09/09/2023 93 (H)     ALT (U/L)   Date Value   09/09/2023 51 (H)     BUN (mg/dL)   Date Value   10/21/2022 14     BP Readings from Last 3 Encounters:   10/26/23 (!) 150/103   09/13/23 (!) 162/99   07/13/23 137/87          (goal 120/80)        Patient Active Problem List:     New persistent daily headache     Abnormal CT scan, head     Frequent headaches     Migraine without status migrainosus, not intractable     Class 2 obesity due to excess calories with body mass index (BMI) of 35.0 to 35.9 in adult     Neck muscle spasm     Encounter for smoking cessation counseling     Gout tophi     Essential hypertension     Type 2 diabetes mellitus without complication, without long-term current use of insulin ([facility])     Gastroesophageal reflux disease     Hyperlipidemia     Epigastric pain     Helicobacter pylori gastritis     Intertrigo     Vertigo      ----JF

## 2024-02-15 ENCOUNTER — OFFICE VISIT (OUTPATIENT)
Dept: FAMILY MEDICINE CLINIC | Age: 44
End: 2024-02-15

## 2024-02-15 VITALS
SYSTOLIC BLOOD PRESSURE: 120 MMHG | DIASTOLIC BLOOD PRESSURE: 79 MMHG | HEIGHT: 70 IN | WEIGHT: 241.2 LBS | HEART RATE: 105 BPM | BODY MASS INDEX: 34.53 KG/M2

## 2024-02-15 DIAGNOSIS — F41.9 ANXIETY: Primary | ICD-10-CM

## 2024-02-15 PROCEDURE — 3074F SYST BP LT 130 MM HG: CPT

## 2024-02-15 PROCEDURE — 99213 OFFICE O/P EST LOW 20 MIN: CPT

## 2024-02-15 PROCEDURE — 3078F DIAST BP <80 MM HG: CPT

## 2024-02-15 NOTE — PATIENT INSTRUCTIONS
Thank you for letting us take care of you today. We hope all your questions were addressed. If a question was overlooked or something else comes to mind after you return home, please contact a member of your Care Team listed below.      Your Care Team at [facility] is Team #2  Jemma Hatch M.D. (Faculty)  Madelyn Vargas, (Resident)  Tea Jacobs, (Resident)  Erik Espinal, (Resident)  Brianna Wang, (Resident)  Radha Toro, (Resident)  Farideh Solomon, [facility]  Rommel Lima, [facility]  Harriet Guzman, [facility]  Alexandra Puente, [facility]  Diane Clark,  [facility]  Jamaica Root, [facility]  Roopa Marquez, [facility]  Sierra Cherry, [facility]  Karen (LJ) S[facility] (Clinical Practice Manager)  Yi Cruz [facility] (Clinical Pharmacist)     Office phone number: [phone]    If you need to get in right away due to illness, please be advised we have \"Same Day\" appointments available Monday-Friday. Please call us at [phone] option #3 to schedule your \"Same Day\" appointment.

## 2024-02-15 NOTE — PROGRESS NOTES
Attending Physician Statement  I have discussed the care of Jo Hernandez, 43 y.o. female,including pertinent history and exam findings,  with the resident Erik Vaz MD.  History:  Chief Complaint   Patient presents with    Anxiety     Same Day - has been on medication for anxiety from other office previously, but has been off meds for at least 4 years     I have reviewed the key elements of the encounter with the resident. Examination was done by resident as documented in residents note.  BP Readings from Last 3 Encounters:   02/15/24 120/79   10/26/23 (!) 150/103   09/13/23 (!) 162/99     /79 (Site: Left Upper Arm, Position: Sitting, Cuff Size: Large Adult) Comment: machine  Pulse (!) 105   Ht 1.778 m (5' 10\")   Wt 109.4 kg (241 lb 3.2 oz)   BMI 34.61 kg/m²   Lab Results   Component Value Date    WBC 5.3 10/21/2022    HGB 14.2 10/21/2022    HCT 43.2 10/21/2022     10/21/2022    CHOL 80 06/13/2023    TRIG 201 (H) 06/13/2023    HDL 43 06/13/2023    LDLDIRECT 36 02/10/2022    ALT 51 (H) 09/09/2023    AST 93 (H) 09/09/2023     10/21/2022    K 4.0 10/21/2022     10/21/2022    CREATININE 0.52 10/21/2022    BUN 14 10/21/2022    CO2 22 10/21/2022    TSH 3.17 03/26/2019    INR 1.3 02/24/2022    LABA1C 6.0 06/13/2023     Lab Results   Component Value Date    CALCIUM 8.9 10/21/2022     Lab Results   Component Value Date    LDLCHOLESTEROL Can not be calculated 06/13/2023    LDLDIRECT 36 02/10/2022     I agree with the assessment, plan and diagnosis of    Diagnosis Orders   1. Anxiety  External Referral To Psychiatry        I agree with  orders as documented by the resident.  Recommendations: Agree with resident A&P.    Return in about 4 weeks (around 3/14/2024) for anxiety f/u.   (GE Modifier ) Dr. SUNDAR MENDEZ MD

## 2024-02-15 NOTE — PROGRESS NOTES
Visit Information    Have you changed or started any medications since your last visit including any over-the-counter medicines, vitamins, or herbal medicines? no   Have you stopped taking any of your medications? Is so, why? -  no  Are you having any side effects from any of your medications? - no    Have you seen any other physician or provider since your last visit?  no   Have you had any other diagnostic tests since your last visit?  no   Have you been seen in the emergency room and/or had an admission in a hospital since we last saw you?  no   Have you had your routine dental cleaning in the past 6 months?  no     Do you have an active MyChart account? If no, what is the barrier?  Yes    Patient Care Team:  Erik Espinal MD as PCP - General (Family Medicine)  Jayson Corey MD as PCP - Empaneled Provider    Medical History Review  Past Medical, Family, and Social History reviewed and does not contribute to the patient presenting condition    Health Maintenance   Topic Date Due    Pneumococcal 0-64 years Vaccine (1 - PCV) Never done    Diabetic retinal exam  Never done    Hepatitis B vaccine (3 of 3 - 19+ 3-dose series) 09/10/2022    Flu vaccine (1) 08/01/2023    COVID-19 Vaccine (5 - 2023-24 season) 09/01/2023    GFR test (Diabetes, CKD 3-4, OR last GFR 15-59)  10/21/2023    Cervical cancer screen  11/04/2023    Diabetic foot exam  06/13/2024    A1C test (Diabetic or Prediabetic)  06/13/2024    Lipids  06/13/2024    Diabetic Alb to Cr ratio (uACR) test  07/13/2024    Depression Screen  10/26/2024    DTaP/Tdap/Td vaccine (2 - Td or Tdap) 03/23/2027    Hepatitis C screen  Completed    HIV screen  Completed    Hepatitis A vaccine  Aged Out    Hib vaccine  Aged Out    HPV vaccine  Aged Out    Polio vaccine  Aged Out    Meningococcal (ACWY) vaccine  Aged Out    Varicella vaccine  Discontinued

## 2024-02-15 NOTE — PROGRESS NOTES
[facility] - Outpatient Note      Subjective:    Jo Hernandez is a 43 y.o. female with  has a past medical history of Anxiety, Arthritis, Class 2 obesity due to excess calories with body mass index (BMI) of 35.0 to 35.9 in adult, Depression, Diabetes mellitus (HCC), Gastroesophageal reflux disease, H/O tubal ligation, Headache, High cholesterol, and Osteoarthritis.    Presented to the office today for:  Chief Complaint   Patient presents with    Anxiety     Same Day - has been on medication for anxiety from other office previously, but has been off meds for at least 4 years       HPI      Anxiety  Patient is here for evaluation of anxiety.  She has the following anxiety symptoms: difficulty concentrating, feelings of losing control, irritable, racing thoughts. Onset of symptoms was approximately a few days ago.  Symptoms have been unchanged since that time. She denies current suicidal and homicidal ideation. Family history significant for anxiety and depression.Possible organic causes contributing are: none. Risk factors: positive family history in  brother(s) and previous episode of depression Previous treatment includes individual therapy and medication  she remembers Seroquel and Wellbutrin only .      Diagnosed about 10 years ago, Bipolar? Depression? and anxiety was seeing a therapist private practice , took Seroquel and Wellbutrin  Doesn't remember if took any thing for bipolar?  Stopped taking meds about 4 years ago  Her daughter also has anxiety, her brother  by suicide 10 years ago, didn't seek help. depression??  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

## 2024-03-04 ENCOUNTER — HOSPITAL ENCOUNTER (OUTPATIENT)
Age: 44
Discharge: HOME | End: 2024-03-04
Payer: MEDICAID

## 2024-03-04 DIAGNOSIS — R16.0 HEPATOMEGALY: ICD-10-CM

## 2024-03-04 LAB
ALBUMIN SERPL-MCNC: 4.8 G/DL (ref 3.5–5.2)
ALBUMIN/GLOB SERPL: 1.5 {RATIO} (ref 1–2.5)
ALP SERPL-CCNC: 87 U/L (ref 35–104)
ALT SERPL-CCNC: 51 U/L (ref 5–33)
ANION GAP SERPL CALCULATED.3IONS-SCNC: 17 MMOL/L (ref 9–17)
AST SERPL-CCNC: 51 U/L
BILIRUB DIRECT SERPL-MCNC: 0.2 MG/DL
BILIRUB INDIRECT SERPL-MCNC: 0.5 MG/DL (ref 0–1)
BILIRUB SERPL-MCNC: 0.7 MG/DL (ref 0.3–1.2)
BUN SERPL-MCNC: 16 MG/DL (ref 6–20)
CALCIUM SERPL-MCNC: 9.5 MG/DL (ref 8.6–10.4)
CERULOPLASMIN SERPL-MCNC: 23 MG/DL (ref 16–45)
CHLORIDE SERPL-SCNC: 104 MMOL/L (ref 98–107)
CO2 SERPL-SCNC: 20 MMOL/L (ref 20–31)
CREAT SERPL-MCNC: 0.5 MG/DL (ref 0.5–0.9)
GFR SERPL CREATININE-BSD FRML MDRD: >60 ML/MIN/1.73M2
GLUCOSE SERPL-MCNC: 131 MG/DL (ref 70–99)
POTASSIUM SERPL-SCNC: 4.2 MMOL/L (ref 3.7–5.3)
PROT SERPL-MCNC: 7.9 G/DL (ref 6.4–8.3)
SODIUM SERPL-SCNC: 141 MMOL/L (ref 135–144)

## 2024-03-04 PROCEDURE — 36415 COLL VENOUS BLD VENIPUNCTURE: CPT

## 2024-03-04 PROCEDURE — 83516 IMMUNOASSAY NONANTIBODY: CPT

## 2024-03-04 PROCEDURE — 86038 ANTINUCLEAR ANTIBODIES: CPT

## 2024-03-04 PROCEDURE — 80053 COMPREHEN METABOLIC PANEL: CPT

## 2024-03-04 PROCEDURE — 86225 DNA ANTIBODY NATIVE: CPT

## 2024-03-04 PROCEDURE — 82104 ALPHA-1-ANTITRYPSIN PHENO: CPT

## 2024-03-04 PROCEDURE — 82103 ALPHA-1-ANTITRYPSIN TOTAL: CPT

## 2024-03-04 PROCEDURE — 82248 BILIRUBIN DIRECT: CPT

## 2024-03-04 PROCEDURE — 82390 ASSAY OF CERULOPLASMIN: CPT

## 2024-03-06 LAB
ANA SER QL IA: NEGATIVE
DSDNA IGG SER QL IA: <0.5 IU/ML
MITOCHONDRIA M2 IGG SER-ACNC: 1.4 U/ML (ref 0–4)
NUCLEAR IGG SER IA-RTO: 0.3 U/ML

## 2024-03-08 ENCOUNTER — OFFICE VISIT (OUTPATIENT)
Dept: GASTROENTEROLOGY | Age: 44
End: 2024-03-08

## 2024-03-08 VITALS
WEIGHT: 246.4 LBS | HEIGHT: 70 IN | HEART RATE: 101 BPM | BODY MASS INDEX: 35.28 KG/M2 | SYSTOLIC BLOOD PRESSURE: 152 MMHG | DIASTOLIC BLOOD PRESSURE: 98 MMHG

## 2024-03-08 DIAGNOSIS — F10.21 HISTORY OF ALCOHOLISM (HCC): Primary | ICD-10-CM

## 2024-03-08 LAB
ALPHA-1 ANTITRYPSIN PHENOTYPE: NORMAL
ALPHA-1 ANTITRYPSIN: 150 MG/DL (ref 90–200)

## 2024-03-08 RX ORDER — LURASIDONE HYDROCHLORIDE 20 MG/1
TABLET, FILM COATED ORAL
COMMUNITY
Start: 2024-02-29

## 2024-03-08 RX ORDER — MIRTAZAPINE 15 MG/1
TABLET, FILM COATED ORAL
COMMUNITY
Start: 2024-02-29

## 2024-03-08 RX ORDER — BUSPIRONE HYDROCHLORIDE 7.5 MG/1
TABLET ORAL
COMMUNITY
Start: 2024-02-29

## 2024-03-08 RX ORDER — PRAZOSIN HYDROCHLORIDE 1 MG/1
CAPSULE ORAL
COMMUNITY
Start: 2024-02-29

## 2024-03-08 ASSESSMENT — ENCOUNTER SYMPTOMS
COUGH: 0
WHEEZING: 0
BLOOD IN STOOL: 0
NAUSEA: 0
DIARRHEA: 1
TROUBLE SWALLOWING: 0
VOMITING: 0
CONSTIPATION: 0
ABDOMINAL DISTENTION: 1
ANAL BLEEDING: 0
RECTAL PAIN: 0
ABDOMINAL PAIN: 1
CHOKING: 0
SORE THROAT: 0

## 2024-03-08 NOTE — PROGRESS NOTES
GI OFFICE FOLLOW UP    INTERVAL HISTORY:   No referring provider defined for this encounter.    Chief Complaint   Patient presents with    Follow-up     Labs       No diagnosis found.          HISTORY OF PRESENT ILLNESS:   Patient seen for follow-up of hepatic steatosis.    Patient has history of alcoholism.  It was felt that hepatic disease noticed is secondary to alcoholism.  At present patient does not drink on the weekends.  Denies swelling of the extremities, signs of encephalopathy.  Right upper quadrant discomfort getting better.    Recent labs reviewed and AST slowly getting better.      Past Medical,Family, and Social History reviewed and does contribute to the patient presenting condition.      Patient's PMH/PSH,SH,PSYCH Hx, MEDs, ALLERGIES, and ROS were all reviewed and updated in the appropriate sections. Yes      PAST MEDICAL HISTORY:  Past Medical History:   Diagnosis Date    Anxiety     Arthritis     knees and hands    Class 2 obesity due to excess calories with body mass index (BMI) of 35.0 to 35.9 in adult 10/4/2018    Depression     Diabetes mellitus (HCC)     DIET CONTROLLED/ PT DENIES Currently    Gastroesophageal reflux disease 2/10/2022    H/O tubal ligation 2004    Headache     High cholesterol     Osteoarthritis        Past Surgical History:   Procedure Laterality Date    ARM SURGERY Left     FOOT NEUROMA SURGERY Left 8/15/2019    EXCISION LEFT HALLUX BENIGN SOFT TISSUE performed by Kwan Chaudhary DPM at [facility] OR    FRACTURE SURGERY      lt forearm with plate 9 years ago    HERNIA REPAIR      TOE SURGERY Left 08/15/2019    Excision of soft tissue mass, left hallux    TUBAL LIGATION  2004    UPPER GASTROINTESTINAL ENDOSCOPY N/A 9/21/2022    EGD BIOPSY performed by Bishop Woodall MD at [facility] ENDO    US GUIDED LIVER BIOPSY PERCUTANEOUS  2/24/2022    US GUIDED LIVER BIOPSY

## 2024-04-22 ENCOUNTER — OFFICE VISIT (OUTPATIENT)
Dept: FAMILY MEDICINE CLINIC | Age: 44
End: 2024-04-22
Payer: MEDICAID

## 2024-04-22 VITALS
BODY MASS INDEX: 35.65 KG/M2 | OXYGEN SATURATION: 98 % | TEMPERATURE: 97.3 F | HEART RATE: 105 BPM | SYSTOLIC BLOOD PRESSURE: 140 MMHG | HEIGHT: 70 IN | DIASTOLIC BLOOD PRESSURE: 92 MMHG | WEIGHT: 249 LBS

## 2024-04-22 DIAGNOSIS — F41.9 ANXIETY: Primary | ICD-10-CM

## 2024-04-22 DIAGNOSIS — E78.5 HYPERLIPIDEMIA, UNSPECIFIED HYPERLIPIDEMIA TYPE: ICD-10-CM

## 2024-04-22 DIAGNOSIS — R73.03 PREDIABETES: ICD-10-CM

## 2024-04-22 DIAGNOSIS — I10 ESSENTIAL HYPERTENSION: ICD-10-CM

## 2024-04-22 DIAGNOSIS — K21.9 GASTROESOPHAGEAL REFLUX DISEASE, UNSPECIFIED WHETHER ESOPHAGITIS PRESENT: ICD-10-CM

## 2024-04-22 PROBLEM — E11.9 TYPE 2 DIABETES MELLITUS WITHOUT COMPLICATION, WITHOUT LONG-TERM CURRENT USE OF INSULIN (HCC): Status: RESOLVED | Noted: 2021-03-25 | Resolved: 2024-04-22

## 2024-04-22 PROBLEM — K74.69 OTHER CIRRHOSIS OF LIVER (HCC): Status: ACTIVE | Noted: 2024-04-22

## 2024-04-22 PROCEDURE — 99213 OFFICE O/P EST LOW 20 MIN: CPT

## 2024-04-22 PROCEDURE — 3077F SYST BP >= 140 MM HG: CPT

## 2024-04-22 PROCEDURE — 3080F DIAST BP >= 90 MM HG: CPT

## 2024-04-22 RX ORDER — SUMATRIPTAN 50 MG/1
50 TABLET, FILM COATED ORAL PRN
Qty: 20 TABLET | Refills: 0 | Status: SHIPPED | OUTPATIENT
Start: 2024-04-22

## 2024-04-22 RX ORDER — ATORVASTATIN CALCIUM 40 MG/1
TABLET, FILM COATED ORAL
Qty: 60 TABLET | Refills: 5 | Status: SHIPPED | OUTPATIENT
Start: 2024-04-22

## 2024-04-22 RX ORDER — AMLODIPINE BESYLATE 5 MG/1
TABLET ORAL
Qty: 90 TABLET | Refills: 2 | Status: SHIPPED | OUTPATIENT
Start: 2024-04-22

## 2024-04-22 RX ORDER — OMEPRAZOLE 40 MG/1
40 CAPSULE, DELAYED RELEASE ORAL DAILY
Qty: 30 CAPSULE | Refills: 1 | Status: SHIPPED | OUTPATIENT
Start: 2024-04-22

## 2024-04-22 ASSESSMENT — ENCOUNTER SYMPTOMS
VOMITING: 0
DIARRHEA: 0
CONSTIPATION: 0
ABDOMINAL PAIN: 0
SHORTNESS OF BREATH: 0
NAUSEA: 0

## 2024-04-22 ASSESSMENT — VISUAL ACUITY: OU: 1

## 2024-04-22 NOTE — PROGRESS NOTES
Attending Physician Statement  I  have discussed the care of Jo Hernandez including pertinent history and exam findings with the resident. I agree with the assessment, plan and orders as documented by the resident.      BP (!) 140/92 (Site: Right Upper Arm, Position: Sitting, Cuff Size: Medium Adult)   Pulse (!) 105   Temp 97.3 °F (36.3 °C) (Infrared)   Ht 1.778 m (5' 10\")   Wt 112.9 kg (249 lb)   SpO2 98%   BMI 35.73 kg/m²    BP Readings from Last 3 Encounters:   04/22/24 (!) 140/92   03/08/24 (!) 152/98   02/15/24 120/79     Wt Readings from Last 3 Encounters:   04/22/24 112.9 kg (249 lb)   03/08/24 111.8 kg (246 lb 6.4 oz)   02/15/24 109.4 kg (241 lb 3.2 oz)          Diagnosis Orders   1. Anxiety        2. Gastroesophageal reflux disease, unspecified whether esophagitis present  omeprazole (PRILOSEC) 40 MG delayed release capsule      3. Hyperlipidemia, unspecified hyperlipidemia type  atorvastatin (LIPITOR) 40 MG tablet      4. Essential hypertension  amLODIPine (NORVASC) 5 MG tablet      5. Prediabetes                Jemma Hathc MD 4/22/2024 4:46 PM      
MOUTH DAILY       Medications Discontinued During This Encounter   Medication Reason    prazosin (MINIPRESS) 1 MG capsule LIST CLEANUP    mirtazapine (REMERON) 15 MG tablet LIST CLEANUP    lurasidone (LATUDA) 20 MG TABS tablet LIST CLEANUP    amLODIPine (NORVASC) 5 MG tablet REORDER    atorvastatin (LIPITOR) 40 MG tablet REORDER    omeprazole (PRILOSEC) 40 MG delayed release capsule REORDER       Jo received counseling on the following healthy behaviors: nutrition, exercise and medication adherence    Discussed use,benefit, and side effects of prescribed medications.  Barriers to medication compliance addressed.        All patient questions answered.  Pt voiced understanding.     Return in about 3 months (around 7/22/2024).      Disclaimer: Some orall of this note was transcribed using voice-recognition software.This may cause typographical errors occasionally. Although all effort is made to fix these errors, please do not hesitate to contact our office if there isany concern with the understanding of this note.

## 2024-06-03 ENCOUNTER — TELEPHONE (OUTPATIENT)
Dept: FAMILY MEDICINE CLINIC | Age: 44
End: 2024-06-03

## 2024-06-03 NOTE — TELEPHONE ENCOUNTER
Care Transitions Initial Follow Up Call    Outreach made within 2 business days of discharge: Yes    Patient: Jo Hernandez Patient : 1980   MRN: [id number]  Reason for Admission: There are no discharge diagnoses documented for the most recent discharge.  Discharge Date:2024       Spoke with: Patient    Discharge department/facility: [facility]    TCM Interactive Patient Contact:  Was patient able to fill all prescriptions: Yes  Was patient instructed to bring all medications to the follow-up visit: Yes  Is patient taking all medications as directed in the discharge summary? Yes  Does patient understand their discharge instructions: Yes  Does patient have questions or concerns that need addressed prior to 7-14 day follow up office visit: no    Scheduled appointment with PCP within 7-14 days    Follow Up  Future Appointments   Date Time Provider Department Center   2024  8:40 AM [facility]   2024  2:15 PM Simba Leal MD [facility]       Sierra Cherry MA

## 2024-06-07 ENCOUNTER — HOSPITAL ENCOUNTER (OUTPATIENT)
Age: 44
Discharge: HOME | End: 2024-06-07
Payer: MEDICAID

## 2024-06-07 ENCOUNTER — HOSPITAL ENCOUNTER (OUTPATIENT)
Dept: ULTRASOUND IMAGING | Age: 44
End: 2024-06-07
Attending: INTERNAL MEDICINE
Payer: MEDICAID

## 2024-06-07 DIAGNOSIS — F10.21 HISTORY OF ALCOHOLISM (HCC): ICD-10-CM

## 2024-06-07 LAB
ALBUMIN SERPL-MCNC: 5 G/DL (ref 3.5–5.2)
ALBUMIN/GLOB SERPL: 2 {RATIO} (ref 1–2.5)
ALP SERPL-CCNC: 103 U/L (ref 35–104)
ALT SERPL-CCNC: 89 U/L (ref 10–35)
ANION GAP SERPL CALCULATED.3IONS-SCNC: 15 MMOL/L (ref 9–16)
AST SERPL-CCNC: 137 U/L (ref 10–35)
BASOPHILS # BLD: 0.05 K/UL (ref 0–0.2)
BASOPHILS NFR BLD: 1 % (ref 0–2)
BILIRUB SERPL-MCNC: 0.8 MG/DL (ref 0–1.2)
BUN SERPL-MCNC: 15 MG/DL (ref 6–20)
CALCIUM SERPL-MCNC: 10.2 MG/DL (ref 8.6–10.4)
CHLORIDE SERPL-SCNC: 103 MMOL/L (ref 98–107)
CO2 SERPL-SCNC: 21 MMOL/L (ref 20–31)
CREAT SERPL-MCNC: 0.8 MG/DL (ref 0.5–0.9)
EOSINOPHIL # BLD: 0.11 K/UL (ref 0–0.44)
EOSINOPHILS RELATIVE PERCENT: 2 % (ref 1–4)
ERYTHROCYTE [DISTWIDTH] IN BLOOD BY AUTOMATED COUNT: 15.1 % (ref 11.8–14.4)
GFR, ESTIMATED: >90 ML/MIN/1.73M2
GLUCOSE SERPL-MCNC: 130 MG/DL (ref 74–99)
HCT VFR BLD AUTO: 41.6 % (ref 36.3–47.1)
HGB BLD-MCNC: 13.4 G/DL (ref 11.9–15.1)
IMM GRANULOCYTES # BLD AUTO: <0.03 K/UL (ref 0–0.3)
IMM GRANULOCYTES NFR BLD: 0 %
LIPASE SERPL-CCNC: 71 U/L (ref 13–60)
LYMPHOCYTES NFR BLD: 2.99 K/UL (ref 1.1–3.7)
LYMPHOCYTES RELATIVE PERCENT: 45 % (ref 24–43)
MCH RBC QN AUTO: 35.4 PG (ref 25.2–33.5)
MCHC RBC AUTO-ENTMCNC: 32.2 G/DL (ref 28.4–34.8)
MCV RBC AUTO: 109.8 FL (ref 82.6–102.9)
MONOCYTES NFR BLD: 0.57 K/UL (ref 0.1–1.2)
MONOCYTES NFR BLD: 9 % (ref 3–12)
NEUTROPHILS NFR BLD: 43 % (ref 36–65)
NEUTS SEG NFR BLD: 2.82 K/UL (ref 1.5–8.1)
NRBC BLD-RTO: 0 PER 100 WBC
PLATELET # BLD AUTO: 247 K/UL (ref 138–453)
PMV BLD AUTO: 10.6 FL (ref 8.1–13.5)
POTASSIUM SERPL-SCNC: 4.5 MMOL/L (ref 3.7–5.3)
PROT SERPL-MCNC: 8.1 G/DL (ref 6.6–8.7)
RBC # BLD AUTO: 3.79 M/UL (ref 3.95–5.11)
RBC # BLD: ABNORMAL 10*6/UL
RBC # BLD: ABNORMAL 10*6/UL
SODIUM SERPL-SCNC: 139 MMOL/L (ref 136–145)
WBC OTHER # BLD: 6.6 K/UL (ref 3.5–11.3)

## 2024-06-07 PROCEDURE — 85025 COMPLETE CBC W/AUTO DIFF WBC: CPT

## 2024-06-07 PROCEDURE — 80053 COMPREHEN METABOLIC PANEL: CPT

## 2024-06-07 PROCEDURE — 36415 COLL VENOUS BLD VENIPUNCTURE: CPT

## 2024-06-07 PROCEDURE — 76705 ECHO EXAM OF ABDOMEN: CPT

## 2024-06-07 PROCEDURE — 83690 ASSAY OF LIPASE: CPT

## 2024-06-10 ENCOUNTER — TELEPHONE (OUTPATIENT)
Dept: FAMILY MEDICINE CLINIC | Age: 44
End: 2024-06-10

## 2024-06-10 DIAGNOSIS — I10 ESSENTIAL HYPERTENSION: Primary | ICD-10-CM

## 2024-06-10 NOTE — TELEPHONE ENCOUNTER
Patient call in requesting blood work for c-diff and electrolytes test to be done before her appointment on 6-20-24.

## 2024-06-12 ENCOUNTER — TELEPHONE (OUTPATIENT)
Dept: GASTROENTEROLOGY | Age: 44
End: 2024-06-12

## 2024-06-12 DIAGNOSIS — R19.7 DIARRHEA, UNSPECIFIED TYPE: Primary | ICD-10-CM

## 2024-06-12 NOTE — TELEPHONE ENCOUNTER
Patient contacted the office requesting a lab order to check for C. Diff. Patient states that she finished her antibiotics and is still experiencing diarrhea. Please advise.

## 2024-06-18 ENCOUNTER — HOSPITAL ENCOUNTER (OUTPATIENT)
Age: 44
Setting detail: SPECIMEN
Discharge: HOME | End: 2024-06-18
Payer: MEDICAID

## 2024-06-18 DIAGNOSIS — R19.7 DIARRHEA, UNSPECIFIED TYPE: ICD-10-CM

## 2024-06-18 PROCEDURE — 87449 NOS EACH ORGANISM AG IA: CPT

## 2024-06-18 PROCEDURE — 87324 CLOSTRIDIUM AG IA: CPT

## 2024-06-19 ENCOUNTER — HOSPITAL ENCOUNTER (OUTPATIENT)
Age: 44
Discharge: HOME | End: 2024-06-19
Payer: MEDICAID

## 2024-06-19 DIAGNOSIS — I10 ESSENTIAL HYPERTENSION: ICD-10-CM

## 2024-06-19 LAB
ANION GAP SERPL CALCULATED.3IONS-SCNC: 15 MMOL/L (ref 9–16)
BUN SERPL-MCNC: 16 MG/DL (ref 6–20)
C DIFF GDH + TOXINS A+B STL QL IA.RAPID: NEGATIVE
CALCIUM SERPL-MCNC: 9.7 MG/DL (ref 8.6–10.4)
CHLORIDE SERPL-SCNC: 106 MMOL/L (ref 98–107)
CO2 SERPL-SCNC: 21 MMOL/L (ref 20–31)
CREAT SERPL-MCNC: 0.7 MG/DL (ref 0.5–0.9)
GFR, ESTIMATED: >90 ML/MIN/1.73M2
GLUCOSE SERPL-MCNC: 135 MG/DL (ref 74–99)
MAGNESIUM SERPL-MCNC: 1.5 MG/DL (ref 1.6–2.6)
POTASSIUM SERPL-SCNC: 3.9 MMOL/L (ref 3.7–5.3)
SODIUM SERPL-SCNC: 142 MMOL/L (ref 136–145)
SPECIMEN DESCRIPTION: NORMAL

## 2024-06-19 PROCEDURE — 83735 ASSAY OF MAGNESIUM: CPT

## 2024-06-19 PROCEDURE — 36415 COLL VENOUS BLD VENIPUNCTURE: CPT

## 2024-06-19 PROCEDURE — 80048 BASIC METABOLIC PNL TOTAL CA: CPT

## 2024-06-20 ENCOUNTER — HOSPITAL ENCOUNTER (OUTPATIENT)
Dept: GENERAL RADIOLOGY | Age: 44
Discharge: HOME | End: 2024-06-22
Payer: MEDICAID

## 2024-06-20 ENCOUNTER — OFFICE VISIT (OUTPATIENT)
Dept: FAMILY MEDICINE CLINIC | Age: 44
End: 2024-06-20
Payer: MEDICAID

## 2024-06-20 ENCOUNTER — HOSPITAL ENCOUNTER (OUTPATIENT)
Age: 44
Discharge: HOME | End: 2024-06-22
Payer: MEDICAID

## 2024-06-20 VITALS
SYSTOLIC BLOOD PRESSURE: 134 MMHG | HEART RATE: 92 BPM | OXYGEN SATURATION: 96 % | BODY MASS INDEX: 34.87 KG/M2 | DIASTOLIC BLOOD PRESSURE: 90 MMHG | WEIGHT: 243 LBS

## 2024-06-20 DIAGNOSIS — E11.9 TYPE 2 DIABETES MELLITUS WITHOUT COMPLICATION, WITHOUT LONG-TERM CURRENT USE OF INSULIN (HCC): ICD-10-CM

## 2024-06-20 DIAGNOSIS — Z09 HOSPITAL DISCHARGE FOLLOW-UP: Primary | ICD-10-CM

## 2024-06-20 DIAGNOSIS — M25.512 CHRONIC LEFT SHOULDER PAIN: ICD-10-CM

## 2024-06-20 DIAGNOSIS — G89.29 CHRONIC LEFT SHOULDER PAIN: ICD-10-CM

## 2024-06-20 PROCEDURE — 3075F SYST BP GE 130 - 139MM HG: CPT

## 2024-06-20 PROCEDURE — 1111F DSCHRG MED/CURRENT MED MERGE: CPT

## 2024-06-20 PROCEDURE — 99213 OFFICE O/P EST LOW 20 MIN: CPT

## 2024-06-20 PROCEDURE — 73030 X-RAY EXAM OF SHOULDER: CPT

## 2024-06-20 PROCEDURE — 3080F DIAST BP >= 90 MM HG: CPT

## 2024-06-20 RX ORDER — POTASSIUM CHLORIDE 20 MEQ/1
20 TABLET, EXTENDED RELEASE ORAL DAILY
Qty: 5 TABLET | Refills: 0 | Status: SHIPPED | OUTPATIENT
Start: 2024-06-20 | End: 2024-06-28 | Stop reason: SDUPTHER

## 2024-06-20 RX ORDER — ERGOCALCIFEROL 1.25 MG/1
50000 CAPSULE ORAL WEEKLY
COMMUNITY
Start: 2024-06-01

## 2024-06-20 RX ORDER — LANOLIN ALCOHOL/MO/W.PET/CERES
400 CREAM (GRAM) TOPICAL 2 TIMES DAILY
COMMUNITY
Start: 2024-06-01

## 2024-06-20 RX ORDER — FAMOTIDINE 20 MG/1
20 TABLET, FILM COATED ORAL 2 TIMES DAILY
COMMUNITY
Start: 2024-06-01

## 2024-06-20 RX ORDER — CALCIUM CARBONATE 500(1250)
1000 TABLET ORAL 2 TIMES DAILY
COMMUNITY
Start: 2024-06-01

## 2024-06-20 RX ORDER — MAGNESIUM OXIDE 400 MG/1
400 TABLET ORAL 2 TIMES DAILY
Qty: 20 TABLET | Refills: 0 | Status: SHIPPED | OUTPATIENT
Start: 2024-06-20 | End: 2024-06-30

## 2024-06-20 NOTE — PROGRESS NOTES
Attending Physician Statement  I have discussed the care of Paolaincluding pertinent history and exam findings,  with the resident. I have reviewed the key elements of all parts of the encounter with the resident.  I agree with the assessment, plan and orders as documented by the resident.  (GE Modifier)    C-Diff Colitis s/p hospital visit- resolved  Hypomagnesemia- replaced  DM- A1c 6.7 Life Style Modification- Diet and Exercise discussed. Wants to hold off Metformin  L shoulder pain- Xray/Tylenol/Voltaren Gel

## 2024-06-20 NOTE — PROGRESS NOTES
Post-Discharge Transitional Care  Follow Up      Jo Hernandez   YOB: 1980    Date of Office Visit:  6/20/2024  Date of Hospital Admission: 10/21/22  Date of Hospital Discharge: 10/21/22  Risk of hospital readmission (high >=14%. Medium >=10%) :No data recorded    Care management risk score Rising risk (score 2-5) and Complex Care (Scores >=6): No Risk Score On File     Non face to face  following discharge, date last encounter closed (first attempt may have been earlier): *No documented post hospital discharge outreach found in the last 14 days    Call initiated 2 business days of discharge: *No response recorded in the last 14 days    ASSESSMENT/PLAN:   Hospital discharge follow-up  -     NC DISCHARGE MEDS RECONCILED W/ CURRENT OUTPATIENT MED LIST  -     magnesium oxide (MAG-OX) 400 MG tablet; Take 1 tablet by mouth 2 times daily for 10 days, Disp-20 tablet, R-0Normal  -     potassium chloride (KLOR-CON M) 20 MEQ extended release tablet; Take 1 tablet by mouth daily, Disp-5 tablet, R-0Normal  Chronic left shoulder pain  -     XR SHOULDER LEFT (MIN 2 VIEWS); Future  -     diclofenac sodium (VOLTAREN) 1 % GEL; Apply 2 g topically 4 times daily, Topical, 4 TIMES DAILY Starting Thu 6/20/2024, Disp-350 g, R-1, Normal  -     [facility] Physical Therapy - [facility]  Type 2 diabetes mellitus without complication, without long-term current use of insulin (HCC)    Medical Decision Making: On this date 6/20/2024 I have spent 10 minutes reviewing previous notes, test results and face to face with the patient discussing the diagnosis and importance of compliance with the treatment plan as well as documenting on the day of the visit.  Return in 2 months (on 8/20/2024) for DM f/u.           Subjective:   HPI:  Follow up of Hospital problems/diagnosis(es):   44-year-old female with past medical history significant for anxiety, hyperlipidemia, hepatic steatosis, essential hypertension  Recent admission  Fasting labs ordered; please call pt

## 2024-06-25 ENCOUNTER — HOSPITAL ENCOUNTER (OUTPATIENT)
Dept: PHYSICAL THERAPY | Age: 44
Setting detail: THERAPIES SERIES
Discharge: HOME | End: 2024-06-25

## 2024-06-25 NOTE — FLOWSHEET NOTE
[x] [facility]  Outpatient Rehabilitation &  Therapy  [address]  [phone]  [phone] [] [facility]  Outpatient Rehabilitation &  Therapy  [address]  P: [phone]  F: [phone] [] [facility]  Outpatient Rehabilitation &  Therapy  [address]  P: [phone]  F: [phone] [] [facility]  Outpatient Rehabilitation &  Therapy  [address]  [phone]  [phone] [] [facility]  Outpatient Rehabilitation &  Therapy  [address]   P: [phone]  F: [phone]  [] [facility]  Outpatient Rehabilitation &  Therapy  [address]  P: [phone]  F: [phone] [] [facility]  Outpatient Rehabilitation &  Therapy  [address]  P: [phone]  F: [phone] [] [facility]  Outpatient Rehabilitation &  Therapy  [address]  P: [phone]  F: [phone] [] [facility]  Outpatient Rehabilitation &  Therapy  [address]  P: [phone]  F: [phone]  [] [facility] Outpatient Rehabilitation &  Therapy  [address]  P: [phone]  F: [phone]     Therapy Cancel/No Show note    Date: 2024  Patient: Jo SANCHEZ David  : 1980  MRN: [id number]    Cancels/No Shows to date: 0    For today's appointment patient:    [x]  Cancelled For Initial Evaluation    [] Rescheduled appointment    [] No-show     Reason given by patient:    []  Patient ill    [x]  Conflicting appointment    [] No transportation      [] Conflict with work    [] No reason given    [] Weather related    [] COVID-19    [] Other:      Comments:        [] Next appointment was confirmed    Electronically signed by: Arlette Lao PT

## 2024-06-27 DIAGNOSIS — Z09 HOSPITAL DISCHARGE FOLLOW-UP: ICD-10-CM

## 2024-06-27 NOTE — TELEPHONE ENCOUNTER
E-scribe request for RAJ IRVING . Please review and e-scribe if applicable.     Last Visit Date:  6/20/24    Next Visit Date:  8/22/2024    Hemoglobin A1C (%)   Date Value   06/13/2023 6.0   03/10/2022 5.7   07/16/2021 5.2             ( goal A1C is < 7)   No components found for: \"LABMICR\"  No components found for: \"LDLCHOLESTEROL\", \"LDLCALC\"    (goal LDL is <100)   AST (U/L)   Date Value   06/07/2024 137 (H)     ALT (U/L)   Date Value   06/07/2024 89 (H)     BUN (mg/dL)   Date Value   06/19/2024 16     BP Readings from Last 3 Encounters:   06/20/24 (!) 134/90   04/22/24 (!) 140/92   03/08/24 (!) 152/98          (goal 120/80)        Patient Active Problem List:     New persistent daily headache     Abnormal CT scan, head     Frequent headaches     Migraine without status migrainosus, not intractable     Class 2 obesity due to excess calories with body mass index (BMI) of 35.0 to 35.9 in adult     Neck muscle spasm     Encounter for smoking cessation counseling     Gout tophi     Essential hypertension     Type 2 diabetes mellitus without complication, without long-term current use of insulin (HCC)     Gastroesophageal reflux disease     Hyperlipidemia     Epigastric pain     Helicobacter pylori gastritis     Intertrigo     Vertigo     Other cirrhosis of liver (HCC)     Hospital discharge follow-up     Chronic left shoulder pain

## 2024-06-28 RX ORDER — POTASSIUM CHLORIDE 1500 MG/1
20 TABLET, EXTENDED RELEASE ORAL DAILY
Qty: 5 TABLET | Refills: 0 | Status: SHIPPED | OUTPATIENT
Start: 2024-06-28

## 2024-07-02 DIAGNOSIS — Z09 HOSPITAL DISCHARGE FOLLOW-UP: ICD-10-CM

## 2024-07-02 RX ORDER — POTASSIUM CHLORIDE 1500 MG/1
20 TABLET, EXTENDED RELEASE ORAL DAILY
Qty: 5 TABLET | Refills: 0 | Status: SHIPPED | OUTPATIENT
Start: 2024-07-02

## 2024-07-02 NOTE — TELEPHONE ENCOUNTER
E-scribe request for RAJ IRVING . Please review and e-scribe if applicable.     Last Visit Date:  06/20/24  Next Visit Date:  8/22/2024    Hemoglobin A1C (%)   Date Value   06/13/2023 6.0   03/10/2022 5.7   07/16/2021 5.2             ( goal A1C is < 7)   No components found for: \"LABMICR\"  No components found for: \"LDLCHOLESTEROL\", \"LDLCALC\"    (goal LDL is <100)   AST (U/L)   Date Value   06/07/2024 137 (H)     ALT (U/L)   Date Value   06/07/2024 89 (H)     BUN (mg/dL)   Date Value   06/19/2024 16     BP Readings from Last 3 Encounters:   06/20/24 (!) 134/90   04/22/24 (!) 140/92   03/08/24 (!) 152/98          (goal 120/80)        Patient Active Problem List:     New persistent daily headache     Abnormal CT scan, head     Frequent headaches     Migraine without status migrainosus, not intractable     Class 2 obesity due to excess calories with body mass index (BMI) of 35.0 to 35.9 in adult     Neck muscle spasm     Encounter for smoking cessation counseling     Gout tophi     Essential hypertension     Type 2 diabetes mellitus without complication, without long-term current use of insulin (HCC)     Gastroesophageal reflux disease     Hyperlipidemia     Epigastric pain     Helicobacter pylori gastritis     Intertrigo     Vertigo     Other cirrhosis of liver (HCC)     Hospital discharge follow-up     Chronic left shoulder pain

## 2024-07-09 ENCOUNTER — HOSPITAL ENCOUNTER (OUTPATIENT)
Dept: PHYSICAL THERAPY | Age: 44
Setting detail: THERAPIES SERIES
Discharge: HOME | End: 2024-07-09
Payer: MEDICAID

## 2024-07-09 ENCOUNTER — OFFICE VISIT (OUTPATIENT)
Dept: GASTROENTEROLOGY | Age: 44
End: 2024-07-09
Payer: MEDICAID

## 2024-07-09 VITALS
BODY MASS INDEX: 35.07 KG/M2 | HEART RATE: 99 BPM | DIASTOLIC BLOOD PRESSURE: 85 MMHG | HEIGHT: 70 IN | SYSTOLIC BLOOD PRESSURE: 136 MMHG | WEIGHT: 245 LBS

## 2024-07-09 DIAGNOSIS — R79.89 ABNORMAL LFTS: Primary | ICD-10-CM

## 2024-07-09 PROCEDURE — 97110 THERAPEUTIC EXERCISES: CPT

## 2024-07-09 PROCEDURE — 3079F DIAST BP 80-89 MM HG: CPT | Performed by: INTERNAL MEDICINE

## 2024-07-09 PROCEDURE — 3075F SYST BP GE 130 - 139MM HG: CPT | Performed by: INTERNAL MEDICINE

## 2024-07-09 PROCEDURE — 99214 OFFICE O/P EST MOD 30 MIN: CPT | Performed by: INTERNAL MEDICINE

## 2024-07-09 PROCEDURE — 97161 PT EVAL LOW COMPLEX 20 MIN: CPT

## 2024-07-09 NOTE — PROGRESS NOTES
GI CLINIC FOLLOW UP    INTERVAL HISTORY:   Erik Espinal MD  [facility], [address] [place] [address]    Chief Complaint   Patient presents with    Follow-up           HISTORY OF PRESENT ILLNESS: Ms.Shelley DANIEL Hernandez is a 44 y.o. female , referred for evaluation of [facility].    She is known c/o- fatty liver. She had liver biopsy in Feb. 2022 which shows- Moderate steatosis. Portal/periportal fibrosis and early bridging fibrosis, without definite cirrhosis.     She is  not on any medication and her LFT's are still elevated.  She follows life-style changes for [facility]. She used to drink alcohol heavily in her 20's and early 30's- she used to drink a bottle of wine every night. Later she used to drink few shots of Vodka everyday. She quit drinking 2 months ago.    No c/o- nausea, vomiting, fever, loss of appetite, weight loss, bloating, bleeding IN, diarrhea, nocturnal diarrhea, tenesmus      Past Medical,Family, and Social History reviewed and does contribute to the patient presentingcondition.    I did review all the labs results available for the labs which were ordered by the primary care physician, and the other consultants, we search on [email or web address] at [facility] and all the available care everywhere epic    I did review all the imaging studies of the abdomen available on EMR, ordered by the primary care physician and the other consultant    I did review all the pathology from the biopsies done on the previous endoscopies    Patient's PMH/PSH,SH,PSYCH Hx, MEDs, ALLERGIES, and ROS were all reviewed and updated in the appropriate sections.    PAST MEDICAL HISTORY:  Past Medical History:   Diagnosis Date    Anxiety     Arthritis     knees and hands    Class 2 obesity due to excess calories with body mass index (BMI) of 35.0 to 35.9 in adult 10/4/2018    Depression     Diabetes mellitus (HCC)     DIET CONTROLLED/ PT DENIES Currently    Gastroesophageal reflux disease 2/10/2022    H/O tubal ligation 2004    Headache

## 2024-07-09 NOTE — CONSULTS
shoulder 5 x  Make sure to do at home 2 x per day         Other:Trial MWM posterior glide left Gh-jt with shoulder elevation but made it worse.    Specific Instructions for next treatment: left shoulder  ROM and strengthening, focus on avoiding frozen shoulder and scapular stabilization/mechanics, manual for upper trap trigger points, trial iontophoresis, US, or Vasocompression for reduction of pain,       Evaluation Complexity:  History (Personal factors, comorbidities) [] 0 [x] 1-2 [] 3+   Exam (limitations, restrictions) [x] 1-2 [] 3 [] 4+   Clinical presentation (progression) [x] Stable [] Evolving  [] Unstable   Decision Making [x] Low [] Moderate [] High    [x] Low Complexity [] Moderate Complexity [] High Complexity       Treatment Charges: Mins Units   [x] Evaluation       [x]  Low       []  Moderate       []  High 35 1   []  Modalities     [x]  Ther Exercise 10 1   []  Manual Therapy     [x]  Ther Activities 10 0   []  Aquatics     []  Vasocompression     []  Other       TOTAL BILLABLE TIME: 60    Time in: 1000    Time Out: 1055    Electronically signed by: Arlette Lao PT        Physician Signature:________________________________Date:__________________  By signing above or cosigning this note, I have reviewed this plan of care and certify a need for medically necessary rehabilitation services.     *PLEASE SIGN ABOVE AND FAX BACK ALL PAGES*     1.94

## 2024-07-10 RX ORDER — SUMATRIPTAN 50 MG/1
TABLET, FILM COATED ORAL
Qty: 15 TABLET | Refills: 0 | Status: SHIPPED | OUTPATIENT
Start: 2024-07-10

## 2024-07-10 NOTE — TELEPHONE ENCOUNTER
Last visit: 06/20/2024  Last Med refill: 04/22/2024  Does patient have enough medication for 72 hours: No:     Next Visit Date:  Future Appointments   Date Time Provider Department Center   7/16/2024 11:00 AM Demetris Black PTA [facility]   7/18/2024 11:00 AM Demetris Black PTA [facility]   8/22/2024  8:45 AM Erik Espinal MD [facility]   10/9/2024 10:00 AM Simba Leal MD [facility]       Health Maintenance   Topic Date Due    Pneumococcal 0-64 years Vaccine (1 of 2 - PCV) Never done    Diabetic retinal exam  Never done    Hepatitis B vaccine (3 of 3 - 19+ 3-dose series) 09/10/2022    COVID-19 Vaccine (5 - 2023-24 season) 09/01/2023    Cervical cancer screen  11/04/2023    Diabetic foot exam  06/13/2024    A1C test (Diabetic or Prediabetic)  06/13/2024    Lipids  06/13/2024    Diabetic Alb to Cr ratio (uACR) test  07/13/2024    Flu vaccine (1) 08/01/2024    Depression Screen  02/15/2025    GFR test (Diabetes, CKD 3-4, OR last GFR 15-59)  06/19/2025    Breast cancer screen  05/24/2026    DTaP/Tdap/Td vaccine (2 - Td or Tdap) 03/23/2027    Hepatitis A vaccine  Completed    Hepatitis C screen  Completed    HIV screen  Completed    Hib vaccine  Aged Out    HPV vaccine  Aged Out    Polio vaccine  Aged Out    Meningococcal (ACWY) vaccine  Aged Out    Varicella vaccine  Discontinued       Hemoglobin A1C (%)   Date Value   06/13/2023 6.0   03/10/2022 5.7   07/16/2021 5.2             ( goal A1C is < 7)   No components found for: \"LABMICR\"  No components found for: \"LDLCHOLESTEROL\", \"LDLCALC\"    (goal LDL is <100)   AST (U/L)   Date Value   06/07/2024 137 (H)     ALT (U/L)   Date Value   06/07/2024 89 (H)     BUN (mg/dL)   Date Value   06/19/2024 16     BP Readings from Last 3 Encounters:   07/09/24 136/85   06/20/24 (!) 134/90   04/22/24 (!) 140/92          (goal 120/80)    All Future Testing planned in [facility]  Lab Frequency Next Occurrence   Hepatic Function Panel Once 09/09/2024

## 2024-07-13 DIAGNOSIS — Z09 HOSPITAL DISCHARGE FOLLOW-UP: ICD-10-CM

## 2024-07-15 RX ORDER — POTASSIUM CHLORIDE 1500 MG/1
20 TABLET, EXTENDED RELEASE ORAL DAILY
Qty: 5 TABLET | Refills: 0 | Status: SHIPPED | OUTPATIENT
Start: 2024-07-15

## 2024-07-15 NOTE — TELEPHONE ENCOUNTER
Last visit: 06/20/2024  Last Med refill: 07/02/2024  Does patient have enough medication for 72 hours: No:     Next Visit Date:  Future Appointments   Date Time Provider Department Center   7/16/2024 11:00 AM Demetris Black PTA [facility]   7/18/2024 11:00 AM Demetris Black PTA [facility]   8/22/2024  8:45 AM Erik Espinal MD [facility]   10/9/2024 10:00 AM Simba Leal MD [facility]       Health Maintenance   Topic Date Due    Pneumococcal 0-64 years Vaccine (1 of 2 - PCV) Never done    Diabetic retinal exam  Never done    Hepatitis B vaccine (3 of 3 - 19+ 3-dose series) 09/10/2022    COVID-19 Vaccine (5 - 2023-24 season) 09/01/2023    Cervical cancer screen  11/04/2023    Diabetic foot exam  06/13/2024    A1C test (Diabetic or Prediabetic)  06/13/2024    Lipids  06/13/2024    Diabetic Alb to Cr ratio (uACR) test  07/13/2024    Flu vaccine (1) 08/01/2024    Depression Screen  02/15/2025    GFR test (Diabetes, CKD 3-4, OR last GFR 15-59)  06/19/2025    Breast cancer screen  05/24/2026    DTaP/Tdap/Td vaccine (2 - Td or Tdap) 03/23/2027    Hepatitis A vaccine  Completed    Hepatitis C screen  Completed    HIV screen  Completed    Hib vaccine  Aged Out    HPV vaccine  Aged Out    Polio vaccine  Aged Out    Meningococcal (ACWY) vaccine  Aged Out    Varicella vaccine  Discontinued       Hemoglobin A1C (%)   Date Value   06/13/2023 6.0   03/10/2022 5.7   07/16/2021 5.2             ( goal A1C is < 7)   No components found for: \"LABMICR\"  No components found for: \"LDLCHOLESTEROL\", \"LDLCALC\"    (goal LDL is <100)   AST (U/L)   Date Value   06/07/2024 137 (H)     ALT (U/L)   Date Value   06/07/2024 89 (H)     BUN (mg/dL)   Date Value   06/19/2024 16     BP Readings from Last 3 Encounters:   07/09/24 136/85   06/20/24 (!) 134/90   04/22/24 (!) 140/92          (goal 120/80)    All Future Testing planned in [facility]  Lab Frequency Next Occurrence   Hepatic Function Panel Once 09/09/2024

## 2024-07-16 ENCOUNTER — HOSPITAL ENCOUNTER (OUTPATIENT)
Dept: PHYSICAL THERAPY | Age: 44
Setting detail: THERAPIES SERIES
Discharge: HOME | End: 2024-07-16
Payer: MEDICAID

## 2024-07-16 PROCEDURE — 97110 THERAPEUTIC EXERCISES: CPT

## 2024-07-16 NOTE — FLOWSHEET NOTE
[x] [facility]  Outpatient Rehabilitation &  Therapy  [address] [place] [address]  [phone]  [phone]     Physical Therapy Daily Treatment Note    Date:  2024  Patient Name:  Jo Hernandez      :  1980    MRN: [id number]  Physician: Alyx Lopez MD                               Insurance: [employer] [place] [employer] ( v)  Medical Diagnosis: M25.512, G89.29 (ICD-10-CM) - Chronic left shoulder pain                 Rehab Codes: Pain M25.512, stiffness M25.612, posture R29.3, myofascial M79.1, M62.838, weakness M62.512  Onset Date: 24                  Next 's appt: ?     Visit# / total visits:    Cancels/No Shows: 0/0    Subjective:    Pain:  [x] Yes  [] No Location: L anterolateral shoulder   Pain Rating: (0-10 scale) 10/10  Pain altered Tx:  [] No  [x] Yes  Action limited progressions   Comments: patient with continued elevated levels of pain throughout Tx. Notes pain is steady which limits use of UE daily.     Objective:  Modalities:   Precautions:  Exercises:  Exercise Reps/ Time Weight/ Level Comments   UBE 2 min  Fwd - stopped due to pain         Seated      Pulleys 3 min  Flexion - within pain tolerable ranges         Shoulder Shrugs 15x     Bkwd Shoulder Rolls 15x     Scapular Retraction 15x3\"     L UT Stretch 3x20\"     Table Slides 10x  Flexion         Supine      Cane:      - Flexion      - Chest Press 15x     - ER 10x     PROM 10 min  Gentle/slow ROM, with progressive movement (move 5-10 deg every couple seconds) rather than one continuous motion - cueing for mm relaxation/decreased guarding                      Other:      Treatment Charges: Mins Units   []  Modalities     [x]  Ther Exercise 42 3   []  Manual Therapy     []  Ther Activities     []  Neuro Re-ed     []  Vasocompression     [] Gait     []  Other     Total Billable time 42 3       Assessment: [] Progressing toward goals.    [] No change.     [x] Other: positive pain throughout bicep and bicep

## 2024-07-18 ENCOUNTER — HOSPITAL ENCOUNTER (OUTPATIENT)
Dept: PHYSICAL THERAPY | Age: 44
Setting detail: THERAPIES SERIES
Discharge: HOME | End: 2024-07-18
Payer: MEDICAID

## 2024-07-18 PROCEDURE — 97110 THERAPEUTIC EXERCISES: CPT

## 2024-07-18 PROCEDURE — 97016 VASOPNEUMATIC DEVICE THERAPY: CPT

## 2024-07-18 NOTE — FLOWSHEET NOTE
[x] [facility]  Outpatient Rehabilitation &  Therapy  [address]  [phone]  [phone]     Physical Therapy Daily Treatment Note    Date:  2024  Patient Name:  Jo Hernandez      :  1980    MRN: [id number]  Physician: Alyx Lopez MD                               Insurance: [employer] [place] [employer] ( v)  Medical Diagnosis: M25.512, G89.29 (ICD-10-CM) - Chronic left shoulder pain                 Rehab Codes: Pain M25.512, stiffness M25.612, posture R29.3, myofascial M79.1, M62.838, weakness M62.512  Onset Date: 24                  Next 's appt: ?     Visit# / total visits: 3/12   Cancels/No Shows: 0/0    Subjective:    Pain:  [x] Yes  [] No Location: L anterolateral shoulder   Pain Rating: (0-10 scale) 7-8/10  Pain altered Tx:  [] No  [x] Yes  Action:  limited progressions   Comments: patient reports a couple hours of shoulder soreness following 1st PT Tx, but eventually did get better later that day. States shoulder is \"the same\" but does report lower verbal pain rating.     Objective:  Modalities: Vasocompression x10 minutes post Tx, Min Compression 38 degrees - seated at end of Tx    Precautions:  Exercises:  Exercise Reps/ Time Weight/ Level Comments   UBE 2 min  Fwd - stopped due to pain held 24         Standing       Finger Ladder 5x ea  Flexion/abduction         Seated      Pulleys 3 min  Flexion - within pain tolerable ranges         Shoulder Shrugs 15x     Bkwd Shoulder Rolls 15x     Scapular Retraction 15x3\"     L UT Stretch 3x20\"     Table Slides 15x ea  Flexion/scaption  Added scaption 24         Supine      Cane:      - Flexion 10x  Close    - Chest Press 15x     - ER 10x  Unable 24   PROM 10 min  Gentle/slow ROM, with progressive movement (move 5-10 deg every couple seconds) rather than one continuous motion - cueing for mm relaxation/decreased guarding                      Other:      Treatment Charges: Mins Units   []

## 2024-07-19 NOTE — PROGRESS NOTES
[x] [facility]  Outpatient Rehabilitation &  Therapy  [address]  [phone]  [phone] [] [facility]  Outpatient Rehabilitation &  Therapy  [address]  P: [phone]  F: [phone] [] [facility]  Outpatient Rehabilitation &  Therapy  [address]  P: [phone]  F: [phone] [] [facility]  Outpatient Rehabilitation &  Therapy  [address]  [phone]  [phone] [] [facility]  Outpatient Rehabilitation &  Therapy  [address]   P: [phone]  F: [phone]  [] [facility]  Outpatient Rehabilitation &  Therapy  [address]  P: [phone]  F: [phone] [] [facility]  Outpatient Rehabilitation &  Therapy  [address]  P: [phone]  F: [phone] [] [facility]  Outpatient Rehabilitation &  Therapy  [address]  P: [phone]  F: [phone] [] [facility]  Outpatient Rehabilitation &  Therapy  [address]  P: [phone]  F: [phone]  [] [facility] Outpatient Rehabilitation &  Therapy  [address]  P: [phone]  F: [phone]        Date: 2024    Patient: Jo Hernandez  : 1980  MRN: [id number]    Physician: Dr. Lopez  Diagnosis: M25.512, G89.29 (ICD-10-CM) - Chronic left shoulder pain               I would like to add Iontophoresis treatments for this patient. We are submitting this prescription for your signature and return. We have been informed by the [place] [employer] Board that in order to use Iontophoresis in Physical Therapy, the prescription must have the dosages included. We apologize for the inconvenience.     [x] Iontophoresis with 4 mg/ml Dexamethasone Sodium Phosphate    [] Dosage: 24-120mAmin     [x]  Medication allergies reviewed for use

## 2024-07-20 PROBLEM — Z09 HOSPITAL DISCHARGE FOLLOW-UP: Status: RESOLVED | Noted: 2024-06-20 | Resolved: 2024-07-20

## 2024-07-23 ENCOUNTER — HOSPITAL ENCOUNTER (OUTPATIENT)
Dept: PHYSICAL THERAPY | Age: 44
Setting detail: THERAPIES SERIES
Discharge: HOME | End: 2024-07-23
Payer: MEDICAID

## 2024-07-23 NOTE — FLOWSHEET NOTE
[x] [facility]  Outpatient Rehabilitation &  Therapy  [address]  [phone]  [phone] [] [facility]  Outpatient Rehabilitation &  Therapy  [address]  P: [phone]  F: [phone] [] [facility]  Outpatient Rehabilitation &  Therapy  [address]  P: [phone]  F: [phone] [] [facility]  Outpatient Rehabilitation &  Therapy  [address]  [phone]  [phone] [] [facility]  Outpatient Rehabilitation &  Therapy  [address]   P: [phone]  F: [phone]  [] [facility]  Outpatient Rehabilitation &  Therapy  [address]  P: [phone]  F: [phone] [] [facility]  Outpatient Rehabilitation &  Therapy  [address]  P: [phone]  F: [phone] [] [facility]  Outpatient Rehabilitation &  Therapy  [address]  P: [phone]  F: [phone] [] [facility]  Outpatient Rehabilitation &  Therapy  [address]  P: [phone]  F: [phone]  [] [facility] Outpatient Rehabilitation &  Therapy  [address]  P: [phone]  F: [phone]     Therapy Cancel/No Show note    Date: 2024  Patient: Jo SANCHEZ David  : 1980  MRN: [id number]    Cancels/No Shows to date: 0    For today's appointment patient:    [x]  Cancelled      [] Rescheduled appointment    [] No-show     Reason given by patient:    []  Patient ill    []  Conflicting appointment    [x] No transportation -  Called stating car trouble.     [] Conflict with work    [] No reason given    [] Weather related    [] COVID-19    [] Other:      Comments:        [x] Next appointment was confirmed    Electronically signed by: Arlette Lao PT

## 2024-07-26 ENCOUNTER — HOSPITAL ENCOUNTER (OUTPATIENT)
Dept: PHYSICAL THERAPY | Age: 44
Setting detail: THERAPIES SERIES
Discharge: HOME | End: 2024-07-26
Payer: MEDICAID

## 2024-07-26 PROCEDURE — 97033 APP MDLTY 1+IONTPHRSIS EA 15: CPT

## 2024-07-26 PROCEDURE — 97110 THERAPEUTIC EXERCISES: CPT

## 2024-07-26 NOTE — FLOWSHEET NOTE
[x] [facility]  Outpatient Rehabilitation &  Therapy  [address]  [phone]  [phone]     Physical Therapy Daily Treatment Note    Date:  2024  Patient Name:  Jo Hernandez      :  1980    MRN: [id number]  Physician: Alyx Lopez MD                               Insurance: [employer] [place] [employer] ( v)  Medical Diagnosis: M25.512, G89.29 (ICD-10-CM) - Chronic left shoulder pain                 Rehab Codes: Pain M25.512, stiffness M25.612, posture R29.3, myofascial M79.1, M62.838, weakness M62.512  Onset Date: 24                  Next 's appt: ?     Visit# / total visits:    Cancels/No Shows: 0/0    Subjective:    Pain:  [x] Yes  [] No Location: L anterolateral shoulder   Pain Rating: (0-10 scale) 10/10  Pain altered Tx:  [] No  [x] Yes  Action:  limited progressions   Comments:     Objective:  Modalities:   [] Vasocompression x10 minutes post Tx, Min Compression 38 degrees - seated at end of Tx  [x] Iontophoresis - 1.0 ml 4 hour extended wear patch (1 of 6)  Precautions:  Exercises:  Exercise Reps/ Time Weight/ Level Comments   UBE 2 min  Fwd - stopped due to pain held 24         Standing       Finger Ladder 5x ea  Flexion/abduction   Cane Extension 15x     Cane IR 10x           Seated      Pulleys 3 min  Flexion - within pain tolerable ranges         Shoulder Shrugs 15x     Bkwd Shoulder Rolls 15x     Scapular Retraction 15x3\"     L UT Stretch 3x20\"     Table Slides 15x ea  Flexion/scaption  Added scaption 24         Supine      Cane:      - Flexion 10x  Close    - Chest Press 15x     - ER 10x  Unable 24                     Other: Starting new job       Treatment Charges: Mins Units   []  Modalities     [x]  Ther Exercise  2   []  Manual Therapy     []  Ther Activities     []  Neuro Re-ed     [x]  Vasocompression     [] Gait     [x]  Iontophoresis  8 1   Total Billable time         Assessment: [] Progressing toward

## 2024-07-30 ENCOUNTER — APPOINTMENT (OUTPATIENT)
Dept: PHYSICAL THERAPY | Age: 44
End: 2024-07-30
Payer: MEDICAID

## 2024-07-31 ENCOUNTER — TELEPHONE (OUTPATIENT)
Dept: FAMILY MEDICINE CLINIC | Age: 44
End: 2024-07-31

## 2024-08-01 ENCOUNTER — HOSPITAL ENCOUNTER (OUTPATIENT)
Dept: PHYSICAL THERAPY | Age: 44
Setting detail: THERAPIES SERIES
Discharge: HOME | End: 2024-08-01
Payer: MEDICAID

## 2024-08-01 PROCEDURE — 97035 APP MDLTY 1+ULTRASOUND EA 15: CPT

## 2024-08-01 PROCEDURE — 97110 THERAPEUTIC EXERCISES: CPT

## 2024-08-01 NOTE — FLOWSHEET NOTE
[x] [facility]  Outpatient Rehabilitation &  Therapy  [address]  [phone]  [phone]     Physical Therapy Daily Treatment Note    Date:  2024  Patient Name:  Jo Hernandez      :  1980    MRN: [id number]  Physician: Alyx Lopez MD                               Insurance: [employer] [place] [employer] ( v)  Medical Diagnosis: M25.512, G89.29 (ICD-10-CM) - Chronic left shoulder pain                 Rehab Codes: Pain M25.512, stiffness M25.612, posture R29.3, myofascial M79.1, M62.838, weakness M62.512  Onset Date: 24                  Next 's appt: ?     Visit# / total visits:    Cancels/No Shows: 0/0    Subjective:    Pain:  [x] Yes  [] No Location: L anterolateral shoulder   Pain Rating: (0-10 scale) 10/10  Pain altered Tx:  [] No  [x] Yes  Action: no progressions due to limited UE use/pain  Comments: patient states pain is unchanged. Remains difficult to use UE - reaching and rotational activities are most bothersome.     Objective:  Modalities:   Treatment Location  Left      Right                          Position    [x]          []  [x] Sitting          Treatment Modality    Hot Pack:    [] Large   [] Medium    [] Cervical     _____ min    Cold Pack   [] Large   [] Medium    [] Cervical     _____ min    Vasocompression    __° temp    ____pressure     _____ min    Electrical Stim:    [] IFC     [] MFAC      [] HVPC                          Protocol:_______  _____X_____min                          #Channels:  [] 1        [] 2       [] Other:   2 Ultrasound: ____1.5__W/cm2 x  ___8___min              [x] 1MHz   [] 3Mhz                      Duty Factor: [x] 100%  [] 50%   [] 20%                  Add: [] Lidex   [] Stim    [] Gel pad      3 Massage:    [] Soft Tissue   [] Cross Friction                      [x] Ice __5__min proximal bicep - proximal deltoids   4 Iontophoresis:  4 hr extended wear patch 1.0 ml    Traction:   [] Prone   [] Supine   X

## (undated) DEVICE — GLOVE ORANGE PI 7   MSG9070

## (undated) DEVICE — STANDARD HYPODERMIC NEEDLE,POLYPROPYLENE HUB: Brand: MONOJECT

## (undated) DEVICE — GLOVE SURG SZ 8 L12IN FNGR THK87MIL WHT LTX FREE

## (undated) DEVICE — BITEBLOCK 54FR W/ DENT RIM BLOX

## (undated) DEVICE — YANKAUER,FLEXIBLE HANDLE,REGLR CAPACITY: Brand: MEDLINE INDUSTRIES, INC.

## (undated) DEVICE — PADDING UNDERCAST W4INXL4YD COT FBR LO LINTING WYTEX

## (undated) DEVICE — GLOVE SURG SZ 75 L12IN FNGR THK87MIL WHT LTX FREE

## (undated) DEVICE — INTENDED FOR TISSUE SEPARATION, AND OTHER PROCEDURES THAT REQUIRE A SHARP SURGICAL BLADE TO PUNCTURE OR CUT.: Brand: BARD-PARKER ® CARBON RIB-BACK BLADES

## (undated) DEVICE — CHLORAPREP 26ML ORANGE

## (undated) DEVICE — TOURNIQUET CUF BLD PRESSURE 4X18 IN 2 PRT SINGLE BLDR RED

## (undated) DEVICE — ENDO KIT W/SYRINGE: Brand: MEDLINE INDUSTRIES, INC.

## (undated) DEVICE — SOLUTION IV IRRIG 500ML 0.9% SODIUM CHL 2F7123

## (undated) DEVICE — FORCEPS BX L240CM WRK CHN 2.8MM STD CAP W/ NDL MIC MESH

## (undated) DEVICE — DEFENDO AIR WATER SUCTION AND BIOPSY VALVE KIT FOR  OLYMPUS: Brand: DEFENDO AIR/WATER/SUCTION AND BIOPSY VALVE

## (undated) DEVICE — SKIN PREP TRAY W/CHG: Brand: MEDLINE INDUSTRIES, INC.

## (undated) DEVICE — TUBING, SUCTION, 1/4" X 12', STRAIGHT: Brand: MEDLINE

## (undated) DEVICE — Device